# Patient Record
Sex: FEMALE | Race: WHITE
[De-identification: names, ages, dates, MRNs, and addresses within clinical notes are randomized per-mention and may not be internally consistent; named-entity substitution may affect disease eponyms.]

---

## 2017-04-01 ENCOUNTER — HOSPITAL ENCOUNTER (INPATIENT)
Dept: HOSPITAL 54 - ER | Age: 82
LOS: 3 days | Discharge: HOME | DRG: 871 | End: 2017-04-04
Attending: NURSE PRACTITIONER | Admitting: NURSE PRACTITIONER
Payer: MEDICARE

## 2017-04-01 VITALS — SYSTOLIC BLOOD PRESSURE: 100 MMHG | DIASTOLIC BLOOD PRESSURE: 50 MMHG

## 2017-04-01 VITALS — WEIGHT: 126 LBS | HEIGHT: 65 IN | BODY MASS INDEX: 20.99 KG/M2

## 2017-04-01 VITALS — DIASTOLIC BLOOD PRESSURE: 65 MMHG | SYSTOLIC BLOOD PRESSURE: 108 MMHG

## 2017-04-01 VITALS — DIASTOLIC BLOOD PRESSURE: 93 MMHG | SYSTOLIC BLOOD PRESSURE: 152 MMHG

## 2017-04-01 DIAGNOSIS — E78.5: ICD-10-CM

## 2017-04-01 DIAGNOSIS — I25.10: ICD-10-CM

## 2017-04-01 DIAGNOSIS — A41.9: Primary | ICD-10-CM

## 2017-04-01 DIAGNOSIS — Z87.440: ICD-10-CM

## 2017-04-01 DIAGNOSIS — G30.9: ICD-10-CM

## 2017-04-01 DIAGNOSIS — E11.65: ICD-10-CM

## 2017-04-01 DIAGNOSIS — F02.80: ICD-10-CM

## 2017-04-01 DIAGNOSIS — N17.0: ICD-10-CM

## 2017-04-01 DIAGNOSIS — G93.40: ICD-10-CM

## 2017-04-01 DIAGNOSIS — I10: ICD-10-CM

## 2017-04-01 DIAGNOSIS — N39.0: ICD-10-CM

## 2017-04-01 DIAGNOSIS — Z83.3: ICD-10-CM

## 2017-04-01 DIAGNOSIS — J96.02: ICD-10-CM

## 2017-04-01 DIAGNOSIS — Z79.82: ICD-10-CM

## 2017-04-01 DIAGNOSIS — F23: ICD-10-CM

## 2017-04-01 DIAGNOSIS — M54.30: ICD-10-CM

## 2017-04-01 DIAGNOSIS — K21.9: ICD-10-CM

## 2017-04-01 LAB
ALBUMIN SERPL BCP-MCNC: 3 G/DL (ref 3.4–5)
ALP SERPL-CCNC: 57 U/L (ref 46–116)
ALT SERPL W P-5'-P-CCNC: 8 U/L (ref 12–78)
APTT PPP: 27 SEC (ref 23–34)
AST SERPL W P-5'-P-CCNC: 15 U/L (ref 15–37)
BACTERIA UR CULT: YES
BASE EXCESS BLDA CALC-SCNC: -2 MMOL/L
BASOPHILS # BLD AUTO: 0 /CMM (ref 0–0.2)
BASOPHILS NFR BLD AUTO: 0.3 % (ref 0–2)
BILIRUB DIRECT SERPL-MCNC: 0.2 MG/DL (ref 0–0.2)
BILIRUB SERPL-MCNC: 0.6 MG/DL (ref 0.2–1)
BUN SERPL-MCNC: 24 MG/DL (ref 7–18)
CALCIUM SERPL-MCNC: 8.4 MG/DL (ref 8.5–10.1)
CHLORIDE SERPL-SCNC: 99 MMOL/L (ref 98–107)
CO2 SERPL-SCNC: 24 MMOL/L (ref 21–32)
CREAT SERPL-MCNC: 1.4 MG/DL (ref 0.6–1.3)
DO-HGB MFR BLDA: 15 MMHG
EOSINOPHIL # BLD AUTO: 0.1 /CMM (ref 0–0.7)
EOSINOPHIL NFR BLD AUTO: 2.1 % (ref 0–6)
GAS PNL BLDA: 10.7 G/DL (ref 12–16)
GLUCOSE SERPL-MCNC: 357 MG/DL (ref 74–106)
HCT VFR BLD AUTO: 35 % (ref 33–45)
HGB BLD-MCNC: 11.3 G/DL (ref 11.5–14.8)
INHALED O2 CONCENTRATION: 32 %
INHALED O2 FLOW RATE: 3 L/MIN (ref 0–30)
INR PPP: 1.09 (ref 0.87–1.13)
LYMPHOCYTES NFR BLD AUTO: 1.2 /CMM (ref 0.8–4.8)
LYMPHOCYTES NFR BLD AUTO: 16.9 % (ref 20–44)
MCH RBC QN AUTO: 29 PG (ref 26–33)
MCHC RBC AUTO-ENTMCNC: 33 G/DL (ref 31–36)
MCV RBC AUTO: 87 FL (ref 82–100)
MONOCYTES NFR BLD AUTO: 0.4 /CMM (ref 0.1–1.3)
MONOCYTES NFR BLD AUTO: 5.6 % (ref 2–12)
NEUTROPHILS # BLD AUTO: 5.4 /CMM (ref 1.8–8.9)
NEUTROPHILS NFR BLD AUTO: 75.1 % (ref 43–81)
PCO2 TEMP ADJ BLDA: 49.7 MMHG (ref 35–45)
PH TEMP ADJ BLDA: 7.31 [PH] (ref 7.35–7.45)
PH UR STRIP: 6.5 [PH] (ref 5–8)
PLATELET # BLD AUTO: 280 /CMM (ref 150–450)
PO2 TEMP ADJ BLDA: 155 MMHG (ref 75–100)
POTASSIUM SERPL-SCNC: 4.1 MMOL/L (ref 3.5–5.1)
PROT SERPL-MCNC: 7 G/DL (ref 6.4–8.2)
PROT UR QL STRIP: 30 MG/DL
PROTHROMBIN TIME: 11.3 SECS (ref 9.5–12.7)
RBC # BLD AUTO: 3.98 MIL/UL (ref 4–5.2)
RBC #/AREA URNS HPF: (no result) /HPF (ref 0–2)
RDW COEFFICIENT OF VARIATION: 12.4 (ref 11.5–15)
SAO2 % BLDA: 98 % (ref 92–98.5)
SODIUM SERPL-SCNC: 132 MMOL/L (ref 136–145)
SP GR UR STRIP: 1.01 (ref 1–1.03)
TROPONIN I SERPL-MCNC: < 0.017 NG/ML (ref 0–0.06)
UROBILINOGEN UR STRIP-MCNC: 0.2 EU/DL
VENTILATION MODE VENT: (no result)
WBC #/AREA URNS HPF: (no result) /HPF
WBC #/AREA URNS HPF: (no result) /HPF (ref 0–3)
WBC NRBC COR # BLD AUTO: 7.1 K/UL (ref 4.3–11)

## 2017-04-01 PROCEDURE — Z7610: HCPCS

## 2017-04-01 PROCEDURE — A4606 OXYGEN PROBE USED W OXIMETER: HCPCS

## 2017-04-01 RX ADMIN — SODIUM CHLORIDE PRN MLS/HR: 9 INJECTION, SOLUTION INTRAVENOUS at 16:01

## 2017-04-01 RX ADMIN — ATORVASTATIN CALCIUM SCH MG: 10 TABLET, FILM COATED ORAL at 22:57

## 2017-04-01 RX ADMIN — INSULIN DETEMIR SCH UNIT: 100 INJECTION, SOLUTION SUBCUTANEOUS at 22:00

## 2017-04-01 RX ADMIN — INSULIN HUMAN PRN UNIT: 100 INJECTION, SOLUTION PARENTERAL at 23:07

## 2017-04-01 RX ADMIN — Medication SCH EACH: at 18:17

## 2017-04-01 RX ADMIN — Medication SCH EACH: at 22:34

## 2017-04-01 NOTE — NUR
RN TELE NOTES

1737 FSBS RESULT = 11, PATIENT FOUND TO BE UNRESPONSIVE, CHARGE NURSE MADE AWARE

1742 RRT PAGED, D50% GIVEN AND FSBS REPEATED WITH RESULT , 

1745 RRT TEAM CAME LORENZO LLANOS FROM ICU, NATASHA RT AND DR. PACKER, VITAL SIGNS STABLE /50 
PULSE 74 R 14 O2SAT 94% TEMP 98.0 2ND DOSE OF D50% GIVEN, FSBS REPEATED WITH RESULT , 
PATIENT STILL UNRESPONSIVE. 

1748 ROMAZICON 0.2MG IVP, PATIENT WOKE UP THEN SLEPT AGAIN, ADDITIONAL 0.3MG IVP GIVEN AND 
PATIENT BECAME MORE ALERT. EKG TAKEN BY RT.

1805 RAPID RESPONSE END TIME 1805

1806 DR. STILES MADE AWARE BY ICU NURSE

ABG AND EKG RESULTS REVIEWED BY DR. PACKER WITH NO NEW ORDER AT THIS TIME. PATIENT TOLERATED 
AND CONSUMED 100% DINNER. LAST FSBS TAKEN WITH RESULT  AT 1900. PATIENT SLEEPING 
INTERMITTENTLY BUT EASILY AROUSABLE. NO S/SX OF DISTRESS AT THIS TIME. WILL ENDORSE TO NIGHT 
SHIFT FOR ROSSI.

## 2017-04-01 NOTE — NUR
radiology called to report pt was moving too much for CT. verbal order for 1mg 
ativan IV now received and administered.

## 2017-04-01 NOTE — NUR
RN TELE NOTES

RECEIVED PATIENT FROM E.R. DEPARTMENT, UNDER THE CARE OF DR. SANDRO STILES, WITH ADMITTING 
DIAGNOSIS OF PNEUMONIA, WITH MEDICAL HX OF DM, HTN, ALZHEIMERS, CARDIAC D/O, SCIATICA, UTI, 
URINARY RETENTION. PATIENT TRANSFERRED TO BED FROM [facility], ALERT AND ORIENTED X1, ON O2 AT 
2LPM VIA NC WITH SPO2 92-94%, NO SHORTNESS OF BREATH NOTED, BILATERAL LUNGS DIMINISHED UPON 
AUSCULTATION, APPEARS TO BE RESTLESS, INITIAL ASSESSMENT COMPLETED, ABDOMEN SOFT AND 
NON-DISTENDED, PIV ON LFA G20, PATENT AND INTACT, FLUSHES WELL WITH NS, AM CARE RENDERED, 
DIAPER SOILED AND CHANGED, ENSURE SAFETY AND COMFORT, SIDERAILS X2 UP, LOW BED, LOCKED 
POSITION, BED ALARM ON, CALL LIGHT WITHIN REACH, WILL CONTINUE TO MONITOR.

## 2017-04-01 NOTE — NUR
CT called to report pt is still moving too much for CT scan. verbal order 
received for 1mg ativan IV now; administered in CT room by me.

## 2017-04-02 VITALS — DIASTOLIC BLOOD PRESSURE: 97 MMHG | SYSTOLIC BLOOD PRESSURE: 118 MMHG

## 2017-04-02 VITALS — DIASTOLIC BLOOD PRESSURE: 69 MMHG | SYSTOLIC BLOOD PRESSURE: 111 MMHG

## 2017-04-02 VITALS — SYSTOLIC BLOOD PRESSURE: 118 MMHG | DIASTOLIC BLOOD PRESSURE: 97 MMHG

## 2017-04-02 VITALS — DIASTOLIC BLOOD PRESSURE: 78 MMHG | SYSTOLIC BLOOD PRESSURE: 155 MMHG

## 2017-04-02 VITALS — DIASTOLIC BLOOD PRESSURE: 65 MMHG | SYSTOLIC BLOOD PRESSURE: 137 MMHG

## 2017-04-02 LAB
ALBUMIN SERPL BCP-MCNC: 3 G/DL (ref 3.4–5)
ALP SERPL-CCNC: 59 U/L (ref 46–116)
ALT SERPL W P-5'-P-CCNC: 18 U/L (ref 12–78)
AST SERPL W P-5'-P-CCNC: 20 U/L (ref 15–37)
BASOPHILS # BLD AUTO: 0 /CMM (ref 0–0.2)
BASOPHILS NFR BLD AUTO: 0.4 % (ref 0–2)
BILIRUB SERPL-MCNC: 0.6 MG/DL (ref 0.2–1)
BUN SERPL-MCNC: 21 MG/DL (ref 7–18)
CALCIUM SERPL-MCNC: 8.7 MG/DL (ref 8.5–10.1)
CHLORIDE SERPL-SCNC: 107 MMOL/L (ref 98–107)
CHOLEST SERPL-MCNC: 100 MG/DL (ref ?–200)
CO2 SERPL-SCNC: 23 MMOL/L (ref 21–32)
CREAT SERPL-MCNC: 1.1 MG/DL (ref 0.6–1.3)
EOSINOPHIL # BLD AUTO: 0.1 /CMM (ref 0–0.7)
EOSINOPHIL NFR BLD AUTO: 1.7 % (ref 0–6)
GLUCOSE SERPL-MCNC: 104 MG/DL (ref 74–106)
HCT VFR BLD AUTO: 35 % (ref 33–45)
HDLC SERPL-MCNC: 42 MG/DL (ref 40–60)
HGB BLD-MCNC: 11.5 G/DL (ref 11.5–14.8)
LDLC SERPL DIRECT ASSAY-MCNC: 42 MG/DL (ref 0–99)
LYMPHOCYTES NFR BLD AUTO: 1.2 /CMM (ref 0.8–4.8)
LYMPHOCYTES NFR BLD AUTO: 15.7 % (ref 20–44)
MAGNESIUM SERPL-MCNC: 1.7 MG/DL (ref 1.8–2.4)
MCH RBC QN AUTO: 29 PG (ref 26–33)
MCHC RBC AUTO-ENTMCNC: 33 G/DL (ref 31–36)
MCV RBC AUTO: 88 FL (ref 82–100)
MONOCYTES NFR BLD AUTO: 0.6 /CMM (ref 0.1–1.3)
MONOCYTES NFR BLD AUTO: 7 % (ref 2–12)
NEUTROPHILS # BLD AUTO: 5.9 /CMM (ref 1.8–8.9)
NEUTROPHILS NFR BLD AUTO: 75.2 % (ref 43–81)
PHOSPHATE SERPL-MCNC: 3.7 MG/DL (ref 2.5–4.9)
PLATELET # BLD AUTO: 299 /CMM (ref 150–450)
POTASSIUM SERPL-SCNC: 4.3 MMOL/L (ref 3.5–5.1)
PROT SERPL-MCNC: 7.3 G/DL (ref 6.4–8.2)
RBC # BLD AUTO: 3.99 MIL/UL (ref 4–5.2)
RDW COEFFICIENT OF VARIATION: 13.5 (ref 11.5–15)
SODIUM SERPL-SCNC: 143 MMOL/L (ref 136–145)
TRIGL SERPL-MCNC: 92 MG/DL (ref 30–150)
TSH SERPL DL<=0.005 MIU/L-ACNC: 1.4 UIU/ML (ref 0.36–3.74)
WBC NRBC COR # BLD AUTO: 7.9 K/UL (ref 4.3–11)

## 2017-04-02 RX ADMIN — METFORMIN HYDROCHLORIDE SCH MG: 500 TABLET, FILM COATED ORAL at 09:53

## 2017-04-02 RX ADMIN — LOSARTAN POTASSIUM SCH MG: 50 TABLET, FILM COATED ORAL at 09:53

## 2017-04-02 RX ADMIN — INSULIN HUMAN PRN UNIT: 100 INJECTION, SOLUTION PARENTERAL at 12:08

## 2017-04-02 RX ADMIN — MAGNESIUM SULFATE IN DEXTROSE SCH MLS/HR: 10 INJECTION, SOLUTION INTRAVENOUS at 12:41

## 2017-04-02 RX ADMIN — PANTOPRAZOLE SODIUM SCH MG: 40 TABLET, DELAYED RELEASE ORAL at 09:53

## 2017-04-02 RX ADMIN — ASPIRIN 81 MG SCH MG: 81 TABLET ORAL at 09:52

## 2017-04-02 RX ADMIN — INSULIN HUMAN PRN UNIT: 100 INJECTION, SOLUTION PARENTERAL at 17:36

## 2017-04-02 RX ADMIN — AMLODIPINE BESYLATE SCH MG: 10 TABLET ORAL at 09:53

## 2017-04-02 RX ADMIN — MAGNESIUM SULFATE IN DEXTROSE SCH MLS/HR: 10 INJECTION, SOLUTION INTRAVENOUS at 13:47

## 2017-04-02 RX ADMIN — DEXTROSE MONOHYDRATE SCH MLS/HR: 50 INJECTION, SOLUTION INTRAVENOUS at 14:50

## 2017-04-02 RX ADMIN — INSULIN DETEMIR SCH UNIT: 100 INJECTION, SOLUTION SUBCUTANEOUS at 21:25

## 2017-04-02 RX ADMIN — Medication SCH EACH: at 07:30

## 2017-04-02 RX ADMIN — INSULIN HUMAN PRN UNIT: 100 INJECTION, SOLUTION PARENTERAL at 21:26

## 2017-04-02 RX ADMIN — METOPROLOL SUCCINATE SCH MG: 50 TABLET, EXTENDED RELEASE ORAL at 09:54

## 2017-04-02 RX ADMIN — Medication SCH EACH: at 21:23

## 2017-04-02 RX ADMIN — ATORVASTATIN CALCIUM SCH MG: 10 TABLET, FILM COATED ORAL at 21:23

## 2017-04-02 RX ADMIN — SODIUM CHLORIDE PRN MLS/HR: 9 INJECTION, SOLUTION INTRAVENOUS at 05:43

## 2017-04-02 RX ADMIN — Medication SCH EACH: at 12:06

## 2017-04-02 RX ADMIN — Medication SCH EACH: at 17:36

## 2017-04-02 RX ADMIN — INSULIN HUMAN PRN UNIT: 100 INJECTION, SOLUTION PARENTERAL at 18:47

## 2017-04-02 NOTE — NUR
RN NOTES PT. AGITATION



PAGED DR STILES THROUGH [email or web address]. PATIENT IS TRYING TO GET OFF BED, PULLING HER IV SITE, REMOVED 
HER TELE LEADS AND BOX, REMOVED GOWN AND BLANKET.

## 2017-04-02 NOTE — NUR
VIET NOTES BLOOD SUGAR 



PATIENT BLOOD SUGAR IS HIGH. 1ST BLOOD SUGAR 402, 2ND BLOOD SUGAR 420. 10 REGULAR INSULIN 
ADMINISTERED. MD NOTIFIED . 

-------------------------------------------------------------------------------

Addendum: 04/02/17 at 1848 by MAHSA BARRERA RN

-------------------------------------------------------------------------------

DR STILES REPLIED. ORDERED TO RECHECK BLOOD SUGAR AND TO ADMINISTER 10 MORE UNITS. BLOOD 
SUGAR , 10 UNITS ADMINISTERED

## 2017-04-02 NOTE — NUR
RN OPEN NOTES



RECEIVED REPORT FROM NIGHT SHIFT NURSE. PATIENT IS IN BED, AWAKE. Tongan SPEAKING. PATIENT 
IS REMOVING BLANKETS AND TRYING TO PULL HER IV LINE. SINUS RHYTHM ON THE MONITOR. IV SITE IS 
COVERED. 3 SIDE RAILS ARE UP. SITTER WILL BE REQUESTED. BED IS IN LOW POSITION AND LOCKED. 
WILL CONTINUE TO ASSESS AND MONITOR PATIENT CONDITION THROUGH OUT MY SHIFT.

## 2017-04-02 NOTE — NUR
TELE RN NOTES



AWAKE & CONFUSED. NOT IN ANY DISTRESS. NO SOB NOTED. DENIES ANY PAIN OR DISCOMFORT AT THIS 
TIME. ON TELE SR @ 69 WITH IVF INFUSING WELL. AM CARE DONE. MONITORED ACCORDINGLY. CALL 
LIGHT WITHIN REACH. BED IN LOWEST POSITION. SR UP X 3 WITH BED ALARM ON FOR SAFETY. WILL  
ENDORSE TO NEXT SHIFT.

## 2017-04-02 NOTE — NUR
RN CLOSING NOTES



PATIENT IS SITTING IN A WHEELCHAIR IN THE HALLWAY NEXT TO THE NURSING STATION. WHEELCHAIR 
LOCKED =. PATIENT IS AWAKE AND TALKING. SPEAKING Thai ONLY. IV SITE ON THE RIGHT WRIST 
IS INTACT AND POTENT. NO SIGN AND SYMPTOMS OF DISTRESS. PLEASE REFER TO BLOOD SUGAR LEVEL IN 
MY PREVIOUS NOTE. 1:1 SITTER ORDERED FOR NIGHT SHIFT.  WILL ENDORSE TO NIGHT SHIFT NURSE.

## 2017-04-02 NOTE — NUR
RN NOTES PT



PATIENT WAS WORKING WITH PHYSICAL THERAPY. WAS ABLE TO SIT AT THE EDGE OF THE BED AND WALKED 
TWO STEPS TO THE SIDE. COMPLETE LINEN AND DIAPER CHANGED

## 2017-04-03 VITALS — DIASTOLIC BLOOD PRESSURE: 79 MMHG | SYSTOLIC BLOOD PRESSURE: 150 MMHG

## 2017-04-03 VITALS — SYSTOLIC BLOOD PRESSURE: 117 MMHG | DIASTOLIC BLOOD PRESSURE: 73 MMHG

## 2017-04-03 VITALS — DIASTOLIC BLOOD PRESSURE: 74 MMHG | SYSTOLIC BLOOD PRESSURE: 130 MMHG

## 2017-04-03 LAB
BASOPHILS # BLD AUTO: 0 /CMM (ref 0–0.2)
BASOPHILS NFR BLD AUTO: 0.6 % (ref 0–2)
BUN SERPL-MCNC: 24 MG/DL (ref 7–18)
CALCIUM SERPL-MCNC: 8.4 MG/DL (ref 8.5–10.1)
CHLORIDE SERPL-SCNC: 108 MMOL/L (ref 98–107)
CO2 SERPL-SCNC: 25 MMOL/L (ref 21–32)
CREAT SERPL-MCNC: 1.2 MG/DL (ref 0.6–1.3)
EOSINOPHIL # BLD AUTO: 0.1 /CMM (ref 0–0.7)
EOSINOPHIL NFR BLD AUTO: 1.8 % (ref 0–6)
GLUCOSE SERPL-MCNC: 88 MG/DL (ref 74–106)
HCT VFR BLD AUTO: 34 % (ref 33–45)
HGB BLD-MCNC: 11.2 G/DL (ref 11.5–14.8)
LYMPHOCYTES NFR BLD AUTO: 1.4 /CMM (ref 0.8–4.8)
LYMPHOCYTES NFR BLD AUTO: 21.1 % (ref 20–44)
MAGNESIUM SERPL-MCNC: 2.1 MG/DL (ref 1.8–2.4)
MCH RBC QN AUTO: 28 PG (ref 26–33)
MCHC RBC AUTO-ENTMCNC: 33 G/DL (ref 31–36)
MCV RBC AUTO: 87 FL (ref 82–100)
MONOCYTES NFR BLD AUTO: 0.6 /CMM (ref 0.1–1.3)
MONOCYTES NFR BLD AUTO: 8.6 % (ref 2–12)
NEUTROPHILS # BLD AUTO: 4.5 /CMM (ref 1.8–8.9)
NEUTROPHILS NFR BLD AUTO: 67.9 % (ref 43–81)
PHOSPHATE SERPL-MCNC: 3.6 MG/DL (ref 2.5–4.9)
PLATELET # BLD AUTO: 275 /CMM (ref 150–450)
POTASSIUM SERPL-SCNC: 4.2 MMOL/L (ref 3.5–5.1)
RBC # BLD AUTO: 3.93 MIL/UL (ref 4–5.2)
RDW COEFFICIENT OF VARIATION: 13.3 (ref 11.5–15)
SODIUM SERPL-SCNC: 144 MMOL/L (ref 136–145)
WBC NRBC COR # BLD AUTO: 6.7 K/UL (ref 4.3–11)

## 2017-04-03 RX ADMIN — Medication SCH EACH: at 12:00

## 2017-04-03 RX ADMIN — Medication SCH EACH: at 21:42

## 2017-04-03 RX ADMIN — Medication SCH EACH: at 07:30

## 2017-04-03 RX ADMIN — NEOMYCIN AND POLYMYXIN B SULFATES AND BACITRACIN ZINC SCH GM: 400; 3.5; 5 OINTMENT TOPICAL at 17:05

## 2017-04-03 RX ADMIN — PANTOPRAZOLE SODIUM SCH MG: 40 TABLET, DELAYED RELEASE ORAL at 09:34

## 2017-04-03 RX ADMIN — ASPIRIN 81 MG SCH MG: 81 TABLET ORAL at 09:34

## 2017-04-03 RX ADMIN — DEXTROSE MONOHYDRATE SCH MLS/HR: 50 INJECTION, SOLUTION INTRAVENOUS at 15:37

## 2017-04-03 RX ADMIN — LOSARTAN POTASSIUM SCH MG: 50 TABLET, FILM COATED ORAL at 09:35

## 2017-04-03 RX ADMIN — METFORMIN HYDROCHLORIDE SCH MG: 500 TABLET, FILM COATED ORAL at 09:00

## 2017-04-03 RX ADMIN — INSULIN HUMAN PRN UNIT: 100 INJECTION, SOLUTION PARENTERAL at 17:33

## 2017-04-03 RX ADMIN — METOPROLOL SUCCINATE SCH MG: 50 TABLET, EXTENDED RELEASE ORAL at 09:34

## 2017-04-03 RX ADMIN — Medication SCH EACH: at 17:03

## 2017-04-03 RX ADMIN — AMLODIPINE BESYLATE SCH MG: 10 TABLET ORAL at 09:34

## 2017-04-03 RX ADMIN — INSULIN DETEMIR SCH UNIT: 100 INJECTION, SOLUTION SUBCUTANEOUS at 21:48

## 2017-04-03 RX ADMIN — ATORVASTATIN CALCIUM SCH MG: 10 TABLET, FILM COATED ORAL at 21:41

## 2017-04-03 RX ADMIN — INSULIN HUMAN PRN UNIT: 100 INJECTION, SOLUTION PARENTERAL at 21:53

## 2017-04-03 NOTE — NUR
WOUND CARE CONSULT: PATIENT SEEN AND SKIN ASSESSMENT DONE. PATIENT ALERT, UNABLE TO TURN AND 
REPOSITION SELF, INCONTINENT, CRISS 13, MATTHEW ISOFLEX ORION BED IN USE. SEE TODAY'S SKIN 
ASSESSMENT ALONG WITH RECOMMENDATIONS. RECOMMEND MOISTURE / SKIN PROTECTION AND PRESSURE 
PREVENTION MEASURES AS ORDERED. ALL DISCUSSED WITH NURSING STAFF. MD IN AGREEMENT WITH PLAN 
OF CARE. 

-------------------------------------------------------------------------------

Addendum: 04/03/17 at 0839 by TYLER OROPEZA WNDNU

-------------------------------------------------------------------------------

Amended: Links added.

## 2017-04-03 NOTE — NUR
MS RN NOTE:



PATIENT BLOOD SUGAR LEVEL 289MG/DL, PATIENT TO RECEIVE 10 UNITS OF LEVEMIR PER MD ORDER AND 
6 UNITS OF INSULIN PER SLIDING SCALE. NO S/S OF HYPER/HYPOGLYCEMIA NOTED AT THIS TIME. WILL 
CONTINUE TO MONITOR.

## 2017-04-03 NOTE — NUR
MS RN NOTE:



PATIENT RESTING IN BED, NO ACUTE DISTRESS NOTED. BREATHING EVEN AND UNLABORED, NO SOB NOTED. 
IV TO RIGHT WRIST IN PLACE. NO S/S OF HYPER/HYPOGLYCEMIA NOTED AT THIS TIME. SITTER AT 
BEDSIDE. BED LOCKED AND IN LOWEST POSITION, CALL LIGHT IN REACH. WILL CONTINUE TO MONITOR.

## 2017-04-03 NOTE — NUR
MS RN

SON TEXTED THAT NOBODY TO PICK HIS MOM TODAY,HE IS IN [facility]. MD AWARE.

## 2017-04-03 NOTE — NUR
MS RN

CALLED SON NASRA, FOR DISCHARGE, SON REFUSE MOM TO BE DISCHARGE TODAY, ACCORDING TO HIM,SHE 
IS NOT YET READY TO BE DISCHARGE,AND WANTS TO SPEAK W/ THE DOCTOR.

## 2017-04-03 NOTE — NUR
MS RN

RECEIVED ON BED, AWAKE,, ORIENTED X1, ON ONE ON ONE SITTER FOR SAFETY, DENIES PAIN AT THIS 
TIME,NO DISTRESS NOTED, WILL MONITOR PATIENT'S CONDITION.

## 2017-04-03 NOTE — NUR
MS RN

BS - 217 - NO COVERAGE GIVEN, PATIENT REFUSE LUNCH,NOT EATING WELL, HAD HISTORY OF 
HYPOGLYCEMIA.

## 2017-04-03 NOTE — NUR
MS RN NOTES



AWAKE & CONFUSED. NOT IN ANY DISTRESS. NO SOB NOTED. DENIES ANY PAIN OR DISCOMFORT AT THIS 
TIME.  WITH IVF INFUSING WELL. AM CARE DONE. MONITORED ACCORDINGLY. CALL LIGHT WITHIN REACH. 
BED IN LOWEST POSITION. WITH SITTER AT BEDSIDE. SR UP X 3 WITH BED ALARM ON FOR SAFETY. WILL 
 ENDORSE TO NEXT SHIFT.

## 2017-04-04 VITALS — SYSTOLIC BLOOD PRESSURE: 157 MMHG | DIASTOLIC BLOOD PRESSURE: 59 MMHG

## 2017-04-04 LAB
BASOPHILS # BLD AUTO: 0 /CMM (ref 0–0.2)
BASOPHILS NFR BLD AUTO: 0.5 % (ref 0–2)
BUN SERPL-MCNC: 21 MG/DL (ref 7–18)
CALCIUM SERPL-MCNC: 8.7 MG/DL (ref 8.5–10.1)
CHLORIDE SERPL-SCNC: 105 MMOL/L (ref 98–107)
CO2 SERPL-SCNC: 24 MMOL/L (ref 21–32)
CREAT SERPL-MCNC: 1.2 MG/DL (ref 0.6–1.3)
EOSINOPHIL # BLD AUTO: 0.2 /CMM (ref 0–0.7)
EOSINOPHIL NFR BLD AUTO: 2.9 % (ref 0–6)
GLUCOSE SERPL-MCNC: 267 MG/DL (ref 74–106)
HCT VFR BLD AUTO: 34 % (ref 33–45)
HGB BLD-MCNC: 11.4 G/DL (ref 11.5–14.8)
LYMPHOCYTES NFR BLD AUTO: 1.1 /CMM (ref 0.8–4.8)
LYMPHOCYTES NFR BLD AUTO: 17.3 % (ref 20–44)
MAGNESIUM SERPL-MCNC: 1.7 MG/DL (ref 1.8–2.4)
MCH RBC QN AUTO: 29 PG (ref 26–33)
MCHC RBC AUTO-ENTMCNC: 33 G/DL (ref 31–36)
MCV RBC AUTO: 86 FL (ref 82–100)
MONOCYTES NFR BLD AUTO: 0.5 /CMM (ref 0.1–1.3)
MONOCYTES NFR BLD AUTO: 8.3 % (ref 2–12)
NEUTROPHILS # BLD AUTO: 4.3 /CMM (ref 1.8–8.9)
NEUTROPHILS NFR BLD AUTO: 71 % (ref 43–81)
PHOSPHATE SERPL-MCNC: 3.4 MG/DL (ref 2.5–4.9)
PLATELET # BLD AUTO: 263 /CMM (ref 150–450)
POTASSIUM SERPL-SCNC: 3.9 MMOL/L (ref 3.5–5.1)
RBC # BLD AUTO: 3.96 MIL/UL (ref 4–5.2)
RDW COEFFICIENT OF VARIATION: 13.7 (ref 11.5–15)
SODIUM SERPL-SCNC: 142 MMOL/L (ref 136–145)
WBC NRBC COR # BLD AUTO: 6.1 K/UL (ref 4.3–11)

## 2017-04-04 RX ADMIN — LOSARTAN POTASSIUM SCH MG: 50 TABLET, FILM COATED ORAL at 09:06

## 2017-04-04 RX ADMIN — INSULIN HUMAN PRN UNIT: 100 INJECTION, SOLUTION PARENTERAL at 06:50

## 2017-04-04 RX ADMIN — Medication SCH EACH: at 06:44

## 2017-04-04 RX ADMIN — NEOMYCIN AND POLYMYXIN B SULFATES AND BACITRACIN ZINC SCH APPLIC: 400; 3.5; 5 OINTMENT TOPICAL at 09:11

## 2017-04-04 RX ADMIN — AMLODIPINE BESYLATE SCH MG: 10 TABLET ORAL at 09:06

## 2017-04-04 RX ADMIN — ASPIRIN 81 MG SCH MG: 81 TABLET ORAL at 09:05

## 2017-04-04 RX ADMIN — METFORMIN HYDROCHLORIDE SCH MG: 500 TABLET, FILM COATED ORAL at 09:06

## 2017-04-04 RX ADMIN — Medication SCH EACH: at 12:31

## 2017-04-04 RX ADMIN — PANTOPRAZOLE SODIUM SCH MG: 40 TABLET, DELAYED RELEASE ORAL at 09:05

## 2017-04-04 RX ADMIN — INSULIN HUMAN PRN UNIT: 100 INJECTION, SOLUTION PARENTERAL at 12:32

## 2017-04-04 RX ADMIN — METOPROLOL SUCCINATE SCH MG: 50 TABLET, EXTENDED RELEASE ORAL at 09:06

## 2017-04-04 NOTE — NUR
MS RN NOTE:



PATIENT RESTING IN BED, NO ACUTE DISTRESS NOTED. BREATHING EVEN AND UNLABORED, NO SOB NOTED. 
IV TO RIGHT WRIST IN PLACE. PATIENT BLOOD SUGAR LEVEL 247MG/DL, PATIENT TO RECEIVE 4 UNITS 
OF INSULIN PER SLIDING SCALE, NO S/S OF HYPER/HYPOGLYCEMIA NOTED AT THIS TIME. SITTER AT 
BEDSIDE. BED LOCKED AND IN LOWEST POSITION, CALL LIGHT IN REACH. WILL ENDORSE TO DAY NURSE 
TO CONTINUE WITH PLAN OF CARE.

## 2017-04-04 NOTE — NUR
MS RN NOTE:



PATIENT SITTING UP IN BED, NO ACUTE DISTRESS NOTED. BREATHING EVEN AND UNLABORED, NO SOB 
NOTED IN NO APPARENT PAIN OR DISCOMFORT. IV TO RIGHT WRIST IN PLACE AND ID BAND REMOVED NO 
S/S OF HYPER/HYPOGLYCEMIA NOTED AT THIS TIME.PATIENT CONTINUES TO REFUSE PICTURES OF SKIN, 
NURSING EDUCATION REINFORCED, DISCHARGE INSTRUCTIONS REVIEWED WITH SON NASRA, WITH NOTED 
VERBAL UNDERSTANDING, ASSISTED TO LOBBY BY CNA DISCHARGED IN STABLE CONDITION

## 2017-04-04 NOTE — NUR
RN NOTES

PATIENT WITH DISCHARGE ORDERS, WILL CONTINUE TO MONITOR AND ASSIST WITH DISCHARGE PROCESS AS 
APPROPRIATE, PT REFUSES PICTURES OF SKIN AT THIS TIME NOTED WITH CONFUSION MD AWARE, NURSING 
EDUCATION REINFORCED, WILL CONTINUE TO MONITOR SON WILL

## 2017-04-04 NOTE — NUR
MS RN NOTE:



PATIENT RESTING IN BED, NO ACUTE DISTRESS NOTED. BREATHING EVEN AND UNLABORED, NO SOB NOTED 
IN NO APPARENT PAIN OR DISCOMFORT. IV TO RIGHT WRIST IN PLACE.  NO S/S OF HYPER/HYPOGLYCEMIA 
NOTED AT THIS TIME. SITTER AT BEDSIDE. BED LOCKED AND IN LOWEST POSITION, CALL LIGHT IN 
REACH. WILL CONTINUE WITH PLAN OF CARE.

## 2017-06-19 ENCOUNTER — HOSPITAL ENCOUNTER (INPATIENT)
Dept: HOSPITAL 54 - ER | Age: 82
LOS: 3 days | Discharge: HOME HEALTH | DRG: 682 | End: 2017-06-22
Attending: NURSE PRACTITIONER | Admitting: NURSE PRACTITIONER
Payer: MEDICARE

## 2017-06-19 VITALS — SYSTOLIC BLOOD PRESSURE: 129 MMHG | DIASTOLIC BLOOD PRESSURE: 59 MMHG

## 2017-06-19 VITALS — HEIGHT: 59 IN | WEIGHT: 130 LBS | BODY MASS INDEX: 26.21 KG/M2

## 2017-06-19 VITALS — DIASTOLIC BLOOD PRESSURE: 66 MMHG | SYSTOLIC BLOOD PRESSURE: 129 MMHG

## 2017-06-19 VITALS — SYSTOLIC BLOOD PRESSURE: 129 MMHG | DIASTOLIC BLOOD PRESSURE: 88 MMHG

## 2017-06-19 DIAGNOSIS — N39.0: ICD-10-CM

## 2017-06-19 DIAGNOSIS — Z87.440: ICD-10-CM

## 2017-06-19 DIAGNOSIS — S40.021A: ICD-10-CM

## 2017-06-19 DIAGNOSIS — G93.40: ICD-10-CM

## 2017-06-19 DIAGNOSIS — B96.20: ICD-10-CM

## 2017-06-19 DIAGNOSIS — K21.9: ICD-10-CM

## 2017-06-19 DIAGNOSIS — M54.30: ICD-10-CM

## 2017-06-19 DIAGNOSIS — G30.9: ICD-10-CM

## 2017-06-19 DIAGNOSIS — N17.0: Primary | ICD-10-CM

## 2017-06-19 DIAGNOSIS — F02.80: ICD-10-CM

## 2017-06-19 DIAGNOSIS — E11.65: ICD-10-CM

## 2017-06-19 DIAGNOSIS — E78.5: ICD-10-CM

## 2017-06-19 DIAGNOSIS — I25.10: ICD-10-CM

## 2017-06-19 DIAGNOSIS — L89.314: ICD-10-CM

## 2017-06-19 DIAGNOSIS — E86.0: ICD-10-CM

## 2017-06-19 LAB
ALBUMIN SERPL BCP-MCNC: 3.3 G/DL (ref 3.4–5)
ALP SERPL-CCNC: 66 U/L (ref 46–116)
ALT SERPL W P-5'-P-CCNC: 12 U/L (ref 12–78)
APTT PPP: 24 SEC (ref 23–34)
AST SERPL W P-5'-P-CCNC: 14 U/L (ref 15–37)
BASOPHILS # BLD AUTO: 0 /CMM (ref 0–0.2)
BASOPHILS NFR BLD AUTO: 0.5 % (ref 0–2)
BILIRUB DIRECT SERPL-MCNC: 0.1 MG/DL (ref 0–0.2)
BILIRUB SERPL-MCNC: 0.5 MG/DL (ref 0.2–1)
BUN SERPL-MCNC: 30 MG/DL (ref 7–18)
CALCIUM SERPL-MCNC: 8.6 MG/DL (ref 8.5–10.1)
CHLORIDE SERPL-SCNC: 105 MMOL/L (ref 98–107)
CO2 SERPL-SCNC: 24 MMOL/L (ref 21–32)
CREAT SERPL-MCNC: 1.2 MG/DL (ref 0.6–1.3)
EOSINOPHIL # BLD AUTO: 0.1 /CMM (ref 0–0.7)
EOSINOPHIL NFR BLD AUTO: 2.2 % (ref 0–6)
GLUCOSE SERPL-MCNC: 252 MG/DL (ref 74–106)
HCT VFR BLD AUTO: 37 % (ref 33–45)
HGB BLD-MCNC: 12.2 G/DL (ref 11.5–14.8)
INR PPP: 1 (ref 0.87–1.13)
LYMPHOCYTES NFR BLD AUTO: 1.4 /CMM (ref 0.8–4.8)
LYMPHOCYTES NFR BLD AUTO: 21.2 % (ref 20–44)
MCH RBC QN AUTO: 28 PG (ref 26–33)
MCHC RBC AUTO-ENTMCNC: 33 G/DL (ref 31–36)
MCV RBC AUTO: 85 FL (ref 82–100)
MONOCYTES NFR BLD AUTO: 0.4 /CMM (ref 0.1–1.3)
MONOCYTES NFR BLD AUTO: 6.4 % (ref 2–12)
NEUTROPHILS # BLD AUTO: 4.5 /CMM (ref 1.8–8.9)
NEUTROPHILS NFR BLD AUTO: 69.7 % (ref 43–81)
PH UR STRIP: 5.5 [PH] (ref 5–8)
PLATELET # BLD AUTO: 262 /CMM (ref 150–450)
POTASSIUM SERPL-SCNC: 3.8 MMOL/L (ref 3.5–5.1)
PROT SERPL-MCNC: 7.8 G/DL (ref 6.4–8.2)
PROT UR QL STRIP: 30 MG/DL
PROTHROMBIN TIME: 10.4 SECS (ref 9.5–12.7)
RBC # BLD AUTO: 4.38 MIL/UL (ref 4–5.2)
RBC #/AREA URNS HPF: (no result) /HPF (ref 0–2)
RDW COEFFICIENT OF VARIATION: 14 (ref 11.5–15)
SODIUM SERPL-SCNC: 138 MMOL/L (ref 136–145)
TROPONIN I SERPL-MCNC: < 0.017 NG/ML (ref 0–0.06)
UROBILINOGEN UR STRIP-MCNC: 0.2 EU/DL
WBC #/AREA URNS HPF: (no result) /HPF
WBC #/AREA URNS HPF: (no result) /HPF (ref 0–3)
WBC NRBC COR # BLD AUTO: 6.4 K/UL (ref 4.3–11)

## 2017-06-19 PROCEDURE — A6248 HYDROGEL DRSG GEL FILLER: HCPCS

## 2017-06-19 PROCEDURE — Z7610: HCPCS

## 2017-06-19 PROCEDURE — A4606 OXYGEN PROBE USED W OXIMETER: HCPCS

## 2017-06-19 RX ADMIN — METFORMIN HYDROCHLORIDE SCH MG: 500 TABLET, FILM COATED ORAL at 16:56

## 2017-06-19 RX ADMIN — SODIUM CHLORIDE PRN MLS/HR: 9 INJECTION, SOLUTION INTRAVENOUS at 17:02

## 2017-06-19 RX ADMIN — ATORVASTATIN CALCIUM SCH MG: 10 TABLET, FILM COATED ORAL at 21:14

## 2017-06-19 RX ADMIN — Medication SCH EACH: at 21:16

## 2017-06-19 RX ADMIN — INSULIN HUMAN PRN UNIT: 100 INJECTION, SOLUTION PARENTERAL at 21:23

## 2017-06-19 RX ADMIN — Medication SCH EACH: at 17:24

## 2017-06-19 RX ADMIN — ENOXAPARIN SODIUM SCH MG: 30 INJECTION SUBCUTANEOUS at 21:15

## 2017-06-19 NOTE — NUR
RN MS NOTES

BLOOD SUGAR CHECKED TWICE WITH RESULT OF 49MG/DL. PATIENT IS ALERT AND ORIENTED, VERBALLY 
RESPONSIVE, ASYMPTOMATIC, PATIENT GIVEN ORANGE JUICE AND IS NOW EATING DINNER, WILL RECHECK 
BLOOD SUGAR AFTER DINNER, CHARGE NURSE MADE AWARE.

## 2017-06-19 NOTE — NUR
MS RN NOTE



PATIENT RECEIVED AWAKE AND ALERT X1 IN BED. PT. CONFUSED. SITTER AT BEDSIDE. NO SOB OR 
RESPIRATORY DISTRESS NOTED. NO S/S O FPAIN. IV SITE INTACT, WITH NO REDNESS OR INFILTRATION 
NOTED. BED LOCKED AND IN LOWEST POSITION. SIDE RAILS UP, CALL LIGHT WITHIN REACH. WILL 
CONTINUE TO MONITOR.

## 2017-06-19 NOTE — NUR
VIET MS NOTES

INFORMED BY HORTENCIA PATIENT ATE DINNER 100% WITH ORANGE JUICE, BLOOD SUGAR RECHECKED WITH RESULT 
OF 120MG/DL, PATIENT IS IN STABLE CONDITION, NO CHANGE IN LOC, NO DISTRESS NOTED, FAMILY AT 
BEDSIDE, APPEARS TO BE CALM AT THIS TIME, 

-------------------------------------------------------------------------------

Addendum: 06/19/17 at 1846 by COREY NAIR RN

-------------------------------------------------------------------------------

ADDENDUM: 

MD MADE AWARE, ALL NEEDS ATTENDED AND MET, ADL CARE PROVIDED, SITTER AT BEDSIDE, WILL 
ENDORSE TO NIGHT SHIFT FOR ROSSI.

## 2017-06-19 NOTE — NUR
RN MS NOTES

ATIVAN 0.5MG IV X1 NOT GIVEN, PATIENT IS CALM AT THIS TIME, FAMILY AND SITTER AT BEDSIDE TO 
ENSURE SAFETY AND COMFORT.

## 2017-06-19 NOTE — NUR
RN MS NOTES 

PATIENT ALERT AND ORIENTED X1, HAS EPISODES OF AGITATION, DR. STILES MADE AWARE AND GAVE 
ATIVAN ORDER X1, ORDER NOTED AND CARRIED OUT.

## 2017-06-19 NOTE — NUR
PT TO ED ROOM 06. BB SON: POSSIBLE UTI. ARLET FASTING BS IN AM: 230.MIRA. 
A/A/O x 1 2/2 ALZHEIMER'S DEMENTIA. CHANGED TO GOWN. SIDE RAISL UP. HOB 
ELEVATED. CONENCTED TO [facility]. SEEN AND EVALAUTED BY ED PROVIDER.

## 2017-06-19 NOTE — NUR
RN ADMISSION NOTE

RECEIVED PATIENT FROM E.R. DEPARTMENT VIA GURNEY, ALERT AND ORIENTED X1, CONFUSED, Occitan 
SPEAKING, ADMITTED DIAGNOSIS UTI&AMS, UNDER THE CARE OF DR. SANDRO STILES MD MADE AWARE OF 
ADMISSION. PATIENT PLACED COMFORTABLY IN BED, SKIN ASSESSMENT COMPLETED, PHOTOS TAKEN OF 
WOUNDS ON BILATERAL BUTTOCKS, CLEANSED WITH NS, COVERED WITH MEPILEX, SITTER AT BEDSIDE, 
WILL CONTINUE TO MONITOR.

## 2017-06-20 VITALS — DIASTOLIC BLOOD PRESSURE: 69 MMHG | SYSTOLIC BLOOD PRESSURE: 128 MMHG

## 2017-06-20 VITALS — SYSTOLIC BLOOD PRESSURE: 136 MMHG | DIASTOLIC BLOOD PRESSURE: 64 MMHG

## 2017-06-20 VITALS — SYSTOLIC BLOOD PRESSURE: 145 MMHG | DIASTOLIC BLOOD PRESSURE: 54 MMHG

## 2017-06-20 LAB
ALBUMIN SERPL BCP-MCNC: 3.5 G/DL (ref 3.4–5)
ALP SERPL-CCNC: 75 U/L (ref 46–116)
ALT SERPL W P-5'-P-CCNC: 18 U/L (ref 12–78)
AST SERPL W P-5'-P-CCNC: 17 U/L (ref 15–37)
BASOPHILS # BLD AUTO: 0 /CMM (ref 0–0.2)
BASOPHILS NFR BLD AUTO: 0.3 % (ref 0–2)
BILIRUB SERPL-MCNC: 0.4 MG/DL (ref 0.2–1)
BUN SERPL-MCNC: 23 MG/DL (ref 7–18)
CALCIUM SERPL-MCNC: 8.8 MG/DL (ref 8.5–10.1)
CHLORIDE SERPL-SCNC: 107 MMOL/L (ref 98–107)
CHOLEST SERPL-MCNC: 136 MG/DL (ref ?–200)
CO2 SERPL-SCNC: 26 MMOL/L (ref 21–32)
CREAT SERPL-MCNC: 1 MG/DL (ref 0.6–1.3)
EOSINOPHIL # BLD AUTO: 0 /CMM (ref 0–0.7)
EOSINOPHIL NFR BLD AUTO: 0.4 % (ref 0–6)
GLUCOSE SERPL-MCNC: 108 MG/DL (ref 74–106)
HCT VFR BLD AUTO: 39 % (ref 33–45)
HDLC SERPL-MCNC: 55 MG/DL (ref 40–60)
HGB BLD-MCNC: 12.8 G/DL (ref 11.5–14.8)
LDLC SERPL DIRECT ASSAY-MCNC: 55 MG/DL (ref 0–99)
LYMPHOCYTES NFR BLD AUTO: 1 /CMM (ref 0.8–4.8)
LYMPHOCYTES NFR BLD AUTO: 13.5 % (ref 20–44)
MAGNESIUM SERPL-MCNC: 1.5 MG/DL (ref 1.8–2.4)
MCH RBC QN AUTO: 29 PG (ref 26–33)
MCHC RBC AUTO-ENTMCNC: 33 G/DL (ref 31–36)
MCV RBC AUTO: 87 FL (ref 82–100)
MONOCYTES NFR BLD AUTO: 0.3 /CMM (ref 0.1–1.3)
MONOCYTES NFR BLD AUTO: 4.7 % (ref 2–12)
NEUTROPHILS # BLD AUTO: 5.9 /CMM (ref 1.8–8.9)
NEUTROPHILS NFR BLD AUTO: 81.1 % (ref 43–81)
PHOSPHATE SERPL-MCNC: 4.1 MG/DL (ref 2.5–4.9)
PLATELET # BLD AUTO: 282 /CMM (ref 150–450)
POTASSIUM SERPL-SCNC: 3.7 MMOL/L (ref 3.5–5.1)
PROT SERPL-MCNC: 8.5 G/DL (ref 6.4–8.2)
RBC # BLD AUTO: 4.47 MIL/UL (ref 4–5.2)
RDW COEFFICIENT OF VARIATION: 15.1 (ref 11.5–15)
SODIUM SERPL-SCNC: 143 MMOL/L (ref 136–145)
TRIGL SERPL-MCNC: 77 MG/DL (ref 30–150)
TSH SERPL DL<=0.005 MIU/L-ACNC: 2.4 UIU/ML (ref 0.36–3.74)
WBC NRBC COR # BLD AUTO: 7.3 K/UL (ref 4.3–11)

## 2017-06-20 RX ADMIN — Medication SCH EACH: at 17:36

## 2017-06-20 RX ADMIN — DEXTROSE MONOHYDRATE SCH MLS/HR: 50 INJECTION, SOLUTION INTRAVENOUS at 16:15

## 2017-06-20 RX ADMIN — INSULIN HUMAN PRN UNIT: 100 INJECTION, SOLUTION PARENTERAL at 12:18

## 2017-06-20 RX ADMIN — Medication SCH EACH: at 12:13

## 2017-06-20 RX ADMIN — ASPIRIN 81 MG SCH MG: 81 TABLET ORAL at 09:07

## 2017-06-20 RX ADMIN — Medication SCH EACH: at 21:28

## 2017-06-20 RX ADMIN — MAGNESIUM SULFATE IN DEXTROSE SCH MLS/HR: 10 INJECTION, SOLUTION INTRAVENOUS at 13:24

## 2017-06-20 RX ADMIN — LOSARTAN POTASSIUM SCH MG: 50 TABLET, FILM COATED ORAL at 17:58

## 2017-06-20 RX ADMIN — Medication PRN GM: at 18:00

## 2017-06-20 RX ADMIN — ENOXAPARIN SODIUM SCH MG: 30 INJECTION SUBCUTANEOUS at 20:51

## 2017-06-20 RX ADMIN — INSULIN HUMAN PRN UNIT: 100 INJECTION, SOLUTION PARENTERAL at 21:43

## 2017-06-20 RX ADMIN — METFORMIN HYDROCHLORIDE SCH MG: 500 TABLET, FILM COATED ORAL at 09:00

## 2017-06-20 RX ADMIN — Medication SCH GM: at 18:01

## 2017-06-20 RX ADMIN — Medication SCH EACH: at 05:43

## 2017-06-20 RX ADMIN — Medication PRN GM: at 17:58

## 2017-06-20 RX ADMIN — METOPROLOL SUCCINATE SCH MG: 50 TABLET, EXTENDED RELEASE ORAL at 09:07

## 2017-06-20 RX ADMIN — ATORVASTATIN CALCIUM SCH MG: 10 TABLET, FILM COATED ORAL at 21:28

## 2017-06-20 RX ADMIN — MAGNESIUM SULFATE IN DEXTROSE SCH MLS/HR: 10 INJECTION, SOLUTION INTRAVENOUS at 12:09

## 2017-06-20 RX ADMIN — AMLODIPINE BESYLATE SCH MG: 10 TABLET ORAL at 09:07

## 2017-06-20 RX ADMIN — Medication PRN GM: at 17:59

## 2017-06-20 RX ADMIN — INSULIN DETEMIR SCH UNIT: 100 INJECTION, SOLUTION SUBCUTANEOUS at 21:43

## 2017-06-20 RX ADMIN — METFORMIN HYDROCHLORIDE SCH MG: 500 TABLET, FILM COATED ORAL at 17:58

## 2017-06-20 NOTE — NUR
MS RN NOTE



PATIENT RECEIVED AWAKE AND ALERT X1 IN BED. PT. CONFUSED. SITTER AT BEDSIDE. NO SOB OR 
RESPIRATORY DISTRESS NOTED. NO S/S OF PAIN. IV SITE ON RIGHT HAND  INTACT, WITH NO REDNESS 
OR INFILTRATION NOTED. BED LOCKED AND IN LOWEST POSITION. SIDE RAILS UP, CALL LIGHT WITHIN 
REACH. WILL CONTINUE TO MONITOR.

## 2017-06-20 NOTE — NUR
MS RN NOTE



BLOOD SUGAR WAS 47. ADMINISTERED D50. RE-CHECKED. . PATIENT STABLE. SITTER AT BEDSIDE. 
WILL ENDORSE TO DAY SHIFT FOR ROSSI.

## 2017-06-20 NOTE — NUR
WOUND CARE CONSULT: LIMITED ASSESSMENT DUE TO PT REFUSING TO HAVE SOCKS REMOVED. PT BECOMES 
AGITATED AND IS COMBATIVE AT TIMES. RT BUTTOCK STAGE II ULCER NOTED TO BE PRESENT ON 
ADMISSION. LEFT BUTTOCK SCARRING/STAINING OF SKIN NOTED. CRISS SCORE IS 17. RECOMMEND 
ISOFLEX LOW AIRLOSS BED, TO BE PLACED WHEN AVAILABLE. DISCUSSED ALL SKIN PROTECTION AND 
WOUND RECOMMENDATIONS WITH NURSING STAFF. WILL SEE PRN. MD IN AGREEMENT WITH PLAN OF CARE. 

-------------------------------------------------------------------------------

Addendum: 06/20/17 at 1122 by TIFFANY HAMPTON WNDNU

-------------------------------------------------------------------------------

Amended: Links added.

## 2017-06-21 VITALS — DIASTOLIC BLOOD PRESSURE: 78 MMHG | SYSTOLIC BLOOD PRESSURE: 155 MMHG

## 2017-06-21 VITALS — DIASTOLIC BLOOD PRESSURE: 73 MMHG | SYSTOLIC BLOOD PRESSURE: 156 MMHG

## 2017-06-21 VITALS — DIASTOLIC BLOOD PRESSURE: 67 MMHG | SYSTOLIC BLOOD PRESSURE: 136 MMHG

## 2017-06-21 LAB
ALBUMIN SERPL BCP-MCNC: 3 G/DL (ref 3.4–5)
ALBUMIN SERPL ELPH-MCNC: 3.8 G/DL (ref 2.9–4.4)
ALBUMIN/GLOB SERPL: 0.9 {RATIO} (ref 0.7–1.7)
ALP SERPL-CCNC: 65 U/L (ref 46–116)
ALPHA1 GLOB SERPL ELPH-MCNC: 0.3 G/DL (ref 0–0.4)
ALPHA2 GLOB SERPL ELPH-MCNC: 1 G/DL (ref 0.4–1)
ALT SERPL W P-5'-P-CCNC: 16 U/L (ref 12–78)
AST SERPL W P-5'-P-CCNC: 21 U/L (ref 15–37)
B-GLOBULIN SERPL ELPH-MCNC: 1.1 G/DL (ref 0.7–1.3)
BASOPHILS # BLD AUTO: 0 /CMM (ref 0–0.2)
BASOPHILS NFR BLD AUTO: 0.5 % (ref 0–2)
BILIRUB SERPL-MCNC: 0.4 MG/DL (ref 0.2–1)
BUN SERPL-MCNC: 15 MG/DL (ref 7–18)
CALCIUM SERPL-MCNC: 8.5 MG/DL (ref 8.5–10.1)
CHLORIDE SERPL-SCNC: 107 MMOL/L (ref 98–107)
CO2 SERPL-SCNC: 23 MMOL/L (ref 21–32)
CREAT SERPL-MCNC: 0.8 MG/DL (ref 0.6–1.3)
EOSINOPHIL # BLD AUTO: 0.1 /CMM (ref 0–0.7)
EOSINOPHIL NFR BLD AUTO: 2.4 % (ref 0–6)
GAMMA GLOB SERPL ELPH-MCNC: 1.7 G/DL (ref 0.4–1.8)
GLOBULIN SER CALC-MCNC: 4.1 G/DL (ref 2.2–3.9)
GLUCOSE SERPL-MCNC: 113 MG/DL (ref 74–106)
HCT VFR BLD AUTO: 34 % (ref 33–45)
HGB BLD-MCNC: 11.5 G/DL (ref 11.5–14.8)
LYMPHOCYTES NFR BLD AUTO: 1.1 /CMM (ref 0.8–4.8)
LYMPHOCYTES NFR BLD AUTO: 19.9 % (ref 20–44)
MCH RBC QN AUTO: 29 PG (ref 26–33)
MCHC RBC AUTO-ENTMCNC: 34 G/DL (ref 31–36)
MCV RBC AUTO: 85 FL (ref 82–100)
MONOCYTES NFR BLD AUTO: 0.3 /CMM (ref 0.1–1.3)
MONOCYTES NFR BLD AUTO: 6.1 % (ref 2–12)
NEUTROPHILS # BLD AUTO: 4.1 /CMM (ref 1.8–8.9)
NEUTROPHILS NFR BLD AUTO: 71.1 % (ref 43–81)
PHOSPHATE SERPL-MCNC: 3.4 MG/DL (ref 2.5–4.9)
PLATELET # BLD AUTO: 254 /CMM (ref 150–450)
POTASSIUM SERPL-SCNC: 3.9 MMOL/L (ref 3.5–5.1)
PROT SERPL-MCNC: 7.4 G/DL (ref 6.4–8.2)
PROT SERPL-MCNC: 7.9 G/DL (ref 6–8.5)
RBC # BLD AUTO: 4.01 MIL/UL (ref 4–5.2)
RDW COEFFICIENT OF VARIATION: 15.1 (ref 11.5–15)
SODIUM SERPL-SCNC: 142 MMOL/L (ref 136–145)
WBC NRBC COR # BLD AUTO: 5.7 K/UL (ref 4.3–11)

## 2017-06-21 RX ADMIN — LOSARTAN POTASSIUM SCH MG: 50 TABLET, FILM COATED ORAL at 11:18

## 2017-06-21 RX ADMIN — Medication SCH EACH: at 21:21

## 2017-06-21 RX ADMIN — METFORMIN HYDROCHLORIDE SCH MG: 500 TABLET, FILM COATED ORAL at 16:39

## 2017-06-21 RX ADMIN — INSULIN HUMAN PRN UNIT: 100 INJECTION, SOLUTION PARENTERAL at 16:57

## 2017-06-21 RX ADMIN — Medication PRN GM: at 11:19

## 2017-06-21 RX ADMIN — SODIUM CHLORIDE PRN MLS/HR: 9 INJECTION, SOLUTION INTRAVENOUS at 04:34

## 2017-06-21 RX ADMIN — INSULIN HUMAN PRN UNIT: 100 INJECTION, SOLUTION PARENTERAL at 21:29

## 2017-06-21 RX ADMIN — ASPIRIN 81 MG SCH MG: 81 TABLET ORAL at 11:17

## 2017-06-21 RX ADMIN — DEXTROSE MONOHYDRATE SCH MLS/HR: 50 INJECTION, SOLUTION INTRAVENOUS at 13:04

## 2017-06-21 RX ADMIN — ENOXAPARIN SODIUM SCH MG: 30 INJECTION SUBCUTANEOUS at 20:01

## 2017-06-21 RX ADMIN — Medication SCH GM: at 11:20

## 2017-06-21 RX ADMIN — METFORMIN HYDROCHLORIDE SCH MG: 500 TABLET, FILM COATED ORAL at 11:19

## 2017-06-21 RX ADMIN — ATORVASTATIN CALCIUM SCH MG: 10 TABLET, FILM COATED ORAL at 21:21

## 2017-06-21 RX ADMIN — Medication SCH EACH: at 07:30

## 2017-06-21 RX ADMIN — INSULIN HUMAN PRN UNIT: 100 INJECTION, SOLUTION PARENTERAL at 13:23

## 2017-06-21 RX ADMIN — AMLODIPINE BESYLATE SCH MG: 10 TABLET ORAL at 11:17

## 2017-06-21 RX ADMIN — LORAZEPAM PRN MG: 2 INJECTION INTRAMUSCULAR; INTRAVENOUS at 16:02

## 2017-06-21 RX ADMIN — LORAZEPAM PRN MG: 2 INJECTION INTRAMUSCULAR; INTRAVENOUS at 11:22

## 2017-06-21 RX ADMIN — METOPROLOL SUCCINATE SCH MG: 50 TABLET, EXTENDED RELEASE ORAL at 11:17

## 2017-06-21 RX ADMIN — Medication SCH EACH: at 12:00

## 2017-06-21 RX ADMIN — Medication SCH EACH: at 16:53

## 2017-06-21 RX ADMIN — INSULIN DETEMIR SCH UNIT: 100 INJECTION, SOLUTION SUBCUTANEOUS at 21:29
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
## 2017-06-21 NOTE — NUR
MS RN CLOSING NOTES 

PT IS IN BED SLEEPING. SLEPT THROUGHOUT THE NIGHT, NO PRN MEDICATIONS NEEDED. NO SIGNS OF 
SOB OR PAIN NOTED. SNACKS WERE GIVEN BEFORE BEDTIME AND BLOOD SUGAR AT 0630 , NO 
COVERAGE WAS NEEDED. WILL ENDORSE TO NIGHT SHIFT.

## 2017-06-21 NOTE — NUR
GLUCOSE 

BS , 3 UNITS OF INSULIN WERE GIVEN. LEVEMIR WAS ADMINISTERED. 

PT WAS GIVEN PUDDING AND APPLE JUICE TO PREVENT HYPOGLYCEMIA. WILL CONTINUE TO MONITOR FOR 
S/S OF HYPOGLYCEMIA.

## 2017-06-21 NOTE — NUR
MS RN NOTE



PATIENT RECEIVED AWAKE AND ALERT X1 IN BED. PT. CONFUSED. SITTER AT BEDSIDE. NO SOB OR 
RESPIRATORY DISTRESS NOTED. NO S/S OF PAIN. IV SITE ON RIGHT HAND  INTACT, WITH NO REDNESS 
OR INFILTRATION NOTED, INFUSING WELL. BED LOCKED AND IN LOWEST POSITION. SIDE RAILS UP, CALL 
LIGHT WITHIN REACH. WILL CONTINUE TO MONITOR.
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
## 2017-06-22 VITALS — SYSTOLIC BLOOD PRESSURE: 127 MMHG | DIASTOLIC BLOOD PRESSURE: 62 MMHG

## 2017-06-22 VITALS — DIASTOLIC BLOOD PRESSURE: 62 MMHG | SYSTOLIC BLOOD PRESSURE: 127 MMHG

## 2017-06-22 LAB
ALBUMIN SERPL BCP-MCNC: 2.9 G/DL (ref 3.4–5)
ALP SERPL-CCNC: 62 U/L (ref 46–116)
ALT SERPL W P-5'-P-CCNC: 16 U/L (ref 12–78)
AST SERPL W P-5'-P-CCNC: 19 U/L (ref 15–37)
BASOPHILS # BLD AUTO: 0 /CMM (ref 0–0.2)
BASOPHILS NFR BLD AUTO: 0.6 % (ref 0–2)
BILIRUB SERPL-MCNC: 0.4 MG/DL (ref 0.2–1)
BUN SERPL-MCNC: 17 MG/DL (ref 7–18)
CALCIUM SERPL-MCNC: 8.5 MG/DL (ref 8.5–10.1)
CHLORIDE SERPL-SCNC: 108 MMOL/L (ref 98–107)
CO2 SERPL-SCNC: 30 MMOL/L (ref 21–32)
CREAT SERPL-MCNC: 0.8 MG/DL (ref 0.6–1.3)
EOSINOPHIL # BLD AUTO: 0.2 /CMM (ref 0–0.7)
EOSINOPHIL NFR BLD AUTO: 3 % (ref 0–6)
GLUCOSE SERPL-MCNC: 107 MG/DL (ref 74–106)
HCT VFR BLD AUTO: 34 % (ref 33–45)
HGB BLD-MCNC: 11.2 G/DL (ref 11.5–14.8)
LYMPHOCYTES NFR BLD AUTO: 1.6 /CMM (ref 0.8–4.8)
LYMPHOCYTES NFR BLD AUTO: 29.1 % (ref 20–44)
MAGNESIUM SERPL-MCNC: 1.8 MG/DL (ref 1.8–2.4)
MCH RBC QN AUTO: 29 PG (ref 26–33)
MCHC RBC AUTO-ENTMCNC: 33 G/DL (ref 31–36)
MCV RBC AUTO: 86 FL (ref 82–100)
MONOCYTES NFR BLD AUTO: 0.5 /CMM (ref 0.1–1.3)
MONOCYTES NFR BLD AUTO: 8.3 % (ref 2–12)
NEUTROPHILS # BLD AUTO: 3.3 /CMM (ref 1.8–8.9)
NEUTROPHILS NFR BLD AUTO: 59 % (ref 43–81)
PHOSPHATE SERPL-MCNC: 3.6 MG/DL (ref 2.5–4.9)
PLATELET # BLD AUTO: 250 /CMM (ref 150–450)
POTASSIUM SERPL-SCNC: 3.9 MMOL/L (ref 3.5–5.1)
PROT SERPL-MCNC: 7.2 G/DL (ref 6.4–8.2)
RBC # BLD AUTO: 3.91 MIL/UL (ref 4–5.2)
RDW COEFFICIENT OF VARIATION: 14.9 (ref 11.5–15)
SODIUM SERPL-SCNC: 143 MMOL/L (ref 136–145)
WBC NRBC COR # BLD AUTO: 5.5 K/UL (ref 4.3–11)

## 2017-06-22 RX ADMIN — SODIUM CHLORIDE PRN MLS/HR: 9 INJECTION, SOLUTION INTRAVENOUS at 03:35

## 2017-06-22 RX ADMIN — METFORMIN HYDROCHLORIDE SCH MG: 500 TABLET, FILM COATED ORAL at 08:19

## 2017-06-22 RX ADMIN — Medication SCH EACH: at 07:15

## 2017-06-22 RX ADMIN — ASPIRIN 81 MG SCH MG: 81 TABLET ORAL at 08:20

## 2017-06-22 RX ADMIN — LOSARTAN POTASSIUM SCH MG: 50 TABLET, FILM COATED ORAL at 08:20

## 2017-06-22 RX ADMIN — LORAZEPAM PRN MG: 2 INJECTION INTRAMUSCULAR; INTRAVENOUS at 10:58

## 2017-06-22 RX ADMIN — DEXTROSE MONOHYDRATE SCH MLS/HR: 50 INJECTION, SOLUTION INTRAVENOUS at 12:09

## 2017-06-22 RX ADMIN — INSULIN HUMAN PRN UNIT: 100 INJECTION, SOLUTION PARENTERAL at 12:19

## 2017-06-22 RX ADMIN — Medication SCH EACH: at 12:13

## 2017-06-22 RX ADMIN — METOPROLOL SUCCINATE SCH MG: 50 TABLET, EXTENDED RELEASE ORAL at 08:21

## 2017-06-22 RX ADMIN — Medication SCH GM: at 08:21

## 2017-06-22 RX ADMIN — AMLODIPINE BESYLATE SCH MG: 10 TABLET ORAL at 08:20

## 2017-06-22 NOTE — NUR
MS RN CLOSING NOTE

PT IS IN BED AWAKE AND ALERT. SLEPT THROUGHOUT THE NIGHT, NO PRN MEDICATIONS NEEDED. NO 
SIGNS OF SOB OR PAIN NOTED. SNACKS WERE GIVEN BEFORE BEDTIME AFTER INSULIN AND LEVEMIR WERE 
ADMINISTERED, AT 0630 BS - NO COVERAGE NEEDED. ECHOCARDIOGRAM WAS DONE. WILL ENDORSE 
TO NIGHT SHIFT.

## 2017-06-22 NOTE — NUR
RN NOTES



PT TO BE DISCHARGED TODAY, CALLED SON NASRA FLORES AND SAID THAT HE WILL COME TO PICK-UP PT 
B/W 1400-1500H, ALSO REQUESTED TO HAVE PT TAKE PNA VACCINE.

## 2017-06-22 NOTE — NUR
MS RN OPENING NOTES



RECEIVED PATIENT IN BED AWAKE AND ALERT WITH SITTER AT BEDSIDE. NO S/S OF PAIN OR 
DISCOMFORTS NOTED AT THIS TIME. ON ROOM AIR, RESPIRATION EVEN WITH NO SOB NOTED. IV ACCESS 
ON RIGHT FA INTACT AND PATENT WITH IVF OF NS @ 75ML/HR INFUSING WELL, NO SIGNS OF 
INFILTRATION NOTED. BED AT LOWEST POSITION, LOCKED WITH SIDE-RAILS UP AS APPROPRIATE. CALL 
LIGHT WITHIN REACH. ALL SAFETY PRECAUTIONS MAINTAINED. WILL CONTINUE TO MONITOR ACCORDINGLY.

## 2017-06-22 NOTE — NUR
RN DISCHARGED NOTES



PATIENT DISCHARGED HOME AND LEFT UNIT IN STABLE CONDITION AT 1330H VIA WHEELCHAIR 
ACCOMPANIED BY FAMILY. ALERT AND ORIENTED X2, NO SIGNS OF PAIN OR DISCOMFORTS AT TIME OF 
DISCHARGE. PNEUMO VACCINE GIVEN. V/S TAKEN AND RECORDED. PHOTOS OF SKIN WOUNDS TAKEN AND 
FILED ON CHART. PATIENT ADMITTED WITH NO BELONGINGS, FORMED UN-ABLE TO SIGN BY PT SO 2 STAFF 
SIGNED THE FORM AND FILED ON CHART. HEALTH TEACHINGS GIVEN TO FAMILY AND VERBALIZED 
UNDERSTANDING. MD AND CHARGE NURSE AWARE OF DISCHARGE.

## 2017-12-13 ENCOUNTER — HOSPITAL ENCOUNTER (INPATIENT)
Dept: HOSPITAL 54 - ER | Age: 82
LOS: 6 days | Discharge: HOME HEALTH | DRG: 689 | End: 2017-12-19
Attending: INTERNAL MEDICINE | Admitting: INTERNAL MEDICINE
Payer: MEDICARE

## 2017-12-13 VITALS — BODY MASS INDEX: 20.55 KG/M2 | WEIGHT: 135.56 LBS | HEIGHT: 68 IN

## 2017-12-13 VITALS — SYSTOLIC BLOOD PRESSURE: 141 MMHG | DIASTOLIC BLOOD PRESSURE: 82 MMHG

## 2017-12-13 VITALS — DIASTOLIC BLOOD PRESSURE: 84 MMHG | SYSTOLIC BLOOD PRESSURE: 126 MMHG

## 2017-12-13 DIAGNOSIS — R53.81: ICD-10-CM

## 2017-12-13 DIAGNOSIS — Z91.81: ICD-10-CM

## 2017-12-13 DIAGNOSIS — Z79.4: ICD-10-CM

## 2017-12-13 DIAGNOSIS — Z87.440: ICD-10-CM

## 2017-12-13 DIAGNOSIS — E11.65: ICD-10-CM

## 2017-12-13 DIAGNOSIS — I10: ICD-10-CM

## 2017-12-13 DIAGNOSIS — N17.0: ICD-10-CM

## 2017-12-13 DIAGNOSIS — N39.0: Primary | ICD-10-CM

## 2017-12-13 DIAGNOSIS — G93.40: ICD-10-CM

## 2017-12-13 DIAGNOSIS — E78.5: ICD-10-CM

## 2017-12-13 DIAGNOSIS — B96.20: ICD-10-CM

## 2017-12-13 DIAGNOSIS — K21.9: ICD-10-CM

## 2017-12-13 DIAGNOSIS — F02.80: ICD-10-CM

## 2017-12-13 DIAGNOSIS — G30.9: ICD-10-CM

## 2017-12-13 LAB
APPEARANCE UR: (no result)
APTT PPP: 26 SEC (ref 23–34)
BASOPHILS # BLD AUTO: 0 /CMM (ref 0–0.2)
BASOPHILS NFR BLD AUTO: 0.4 % (ref 0–2)
BILIRUB UR QL STRIP: NEGATIVE
BUN SERPL-MCNC: 42 MG/DL (ref 7–18)
CALCIUM SERPL-MCNC: 9.6 MG/DL (ref 8.5–10.1)
CHLORIDE SERPL-SCNC: 108 MMOL/L (ref 98–107)
CO2 SERPL-SCNC: 23 MMOL/L (ref 21–32)
COLOR UR: YELLOW
CREAT SERPL-MCNC: 1.3 MG/DL (ref 0.6–1.3)
EOSINOPHIL # BLD AUTO: 0.1 /CMM (ref 0–0.7)
EOSINOPHIL NFR BLD AUTO: 1.5 % (ref 0–6)
GLUCOSE SERPL-MCNC: 382 MG/DL (ref 74–106)
GLUCOSE UR STRIP-MCNC: (no result) MG/DL
HCT VFR BLD AUTO: 42 % (ref 33–45)
HGB BLD-MCNC: 13.8 G/DL (ref 11.5–14.8)
HGB UR QL STRIP: (no result) ERY/UL
INR PPP: 0.96 (ref 0.87–1.13)
KETONES UR STRIP-MCNC: NEGATIVE MG/DL
LEUKOCYTE ESTERASE UR QL STRIP: (no result)
LYMPHOCYTES NFR BLD AUTO: 1.5 /CMM (ref 0.8–4.8)
LYMPHOCYTES NFR BLD AUTO: 19.4 % (ref 20–44)
MCH RBC QN AUTO: 28 PG (ref 26–33)
MCHC RBC AUTO-ENTMCNC: 33 G/DL (ref 31–36)
MCV RBC AUTO: 86 FL (ref 82–100)
MONOCYTES NFR BLD AUTO: 0.5 /CMM (ref 0.1–1.3)
MONOCYTES NFR BLD AUTO: 6.8 % (ref 2–12)
NEUTROPHILS # BLD AUTO: 5.5 /CMM (ref 1.8–8.9)
NEUTROPHILS NFR BLD AUTO: 71.9 % (ref 43–81)
NITRITE UR QL STRIP: NEGATIVE
PH UR STRIP: 6 [PH] (ref 5–8)
PLATELET # BLD AUTO: 289 /CMM (ref 150–450)
POTASSIUM SERPL-SCNC: 4.7 MMOL/L (ref 3.5–5.1)
PROT UR QL STRIP: (no result) MG/DL
PROTHROMBIN TIME: 10 SECS (ref 9.5–12.7)
RBC # BLD AUTO: 4.85 MIL/UL (ref 4–5.2)
RBC #/AREA URNS HPF: (no result) /HPF (ref 0–2)
RDW COEFFICIENT OF VARIATION: 14.4 (ref 11.5–15)
SODIUM SERPL-SCNC: 143 MMOL/L (ref 136–145)
TROPONIN I SERPL-MCNC: < 0.017 NG/ML (ref 0–0.06)
UROBILINOGEN UR STRIP-MCNC: 0.2 EU/DL
WBC #/AREA URNS HPF: (no result) /HPF (ref 0–3)
WBC NRBC COR # BLD AUTO: 7.6 K/UL (ref 4.3–11)

## 2017-12-13 PROCEDURE — Z7610: HCPCS

## 2017-12-13 RX ADMIN — DEXTROSE MONOHYDRATE SCH MLS/HR: 50 INJECTION, SOLUTION INTRAVENOUS at 16:30

## 2017-12-13 RX ADMIN — METFORMIN HYDROCHLORIDE SCH MG: 500 TABLET, FILM COATED ORAL at 17:00

## 2017-12-13 RX ADMIN — SODIUM CHLORIDE PRN MLS/HR: 9 INJECTION, SOLUTION INTRAVENOUS at 22:42

## 2017-12-13 RX ADMIN — AMLODIPINE BESYLATE SCH MG: 10 TABLET ORAL at 15:30

## 2017-12-13 RX ADMIN — ENOXAPARIN SODIUM SCH MG: 30 INJECTION SUBCUTANEOUS at 15:30

## 2017-12-14 VITALS — SYSTOLIC BLOOD PRESSURE: 143 MMHG | DIASTOLIC BLOOD PRESSURE: 68 MMHG

## 2017-12-14 VITALS — SYSTOLIC BLOOD PRESSURE: 146 MMHG | DIASTOLIC BLOOD PRESSURE: 77 MMHG

## 2017-12-14 VITALS — SYSTOLIC BLOOD PRESSURE: 128 MMHG | DIASTOLIC BLOOD PRESSURE: 70 MMHG

## 2017-12-14 VITALS — SYSTOLIC BLOOD PRESSURE: 139 MMHG | DIASTOLIC BLOOD PRESSURE: 70 MMHG

## 2017-12-14 VITALS — DIASTOLIC BLOOD PRESSURE: 69 MMHG | SYSTOLIC BLOOD PRESSURE: 131 MMHG

## 2017-12-14 VITALS — SYSTOLIC BLOOD PRESSURE: 113 MMHG | DIASTOLIC BLOOD PRESSURE: 57 MMHG

## 2017-12-14 LAB
ALBUMIN SERPL BCP-MCNC: 3.3 G/DL (ref 3.4–5)
ALP SERPL-CCNC: 79 U/L (ref 46–116)
ALT SERPL W P-5'-P-CCNC: 15 U/L (ref 12–78)
AST SERPL W P-5'-P-CCNC: 22 U/L (ref 15–37)
BASOPHILS # BLD AUTO: 0 /CMM (ref 0–0.2)
BASOPHILS NFR BLD AUTO: 0.7 % (ref 0–2)
BILIRUB SERPL-MCNC: 0.5 MG/DL (ref 0.2–1)
BUN SERPL-MCNC: 34 MG/DL (ref 7–18)
CALCIUM SERPL-MCNC: 9.2 MG/DL (ref 8.5–10.1)
CHLORIDE SERPL-SCNC: 109 MMOL/L (ref 98–107)
CHOLEST SERPL-MCNC: 146 MG/DL (ref ?–200)
CO2 SERPL-SCNC: 21 MMOL/L (ref 21–32)
CREAT SERPL-MCNC: 1.2 MG/DL (ref 0.6–1.3)
EOSINOPHIL # BLD AUTO: 0.1 /CMM (ref 0–0.7)
EOSINOPHIL NFR BLD AUTO: 2.8 % (ref 0–6)
GLUCOSE SERPL-MCNC: 342 MG/DL (ref 74–106)
HCT VFR BLD AUTO: 38 % (ref 33–45)
HDLC SERPL-MCNC: 54 MG/DL (ref 40–60)
HGB BLD-MCNC: 12.5 G/DL (ref 11.5–14.8)
LDLC SERPL DIRECT ASSAY-MCNC: 80 MG/DL (ref 0–99)
LYMPHOCYTES NFR BLD AUTO: 1.9 /CMM (ref 0.8–4.8)
LYMPHOCYTES NFR BLD AUTO: 42.5 % (ref 20–44)
MAGNESIUM SERPL-MCNC: 1.7 MG/DL (ref 1.8–2.4)
MCH RBC QN AUTO: 28 PG (ref 26–33)
MCHC RBC AUTO-ENTMCNC: 33 G/DL (ref 31–36)
MCV RBC AUTO: 86 FL (ref 82–100)
MONOCYTES NFR BLD AUTO: 0.5 /CMM (ref 0.1–1.3)
MONOCYTES NFR BLD AUTO: 10.7 % (ref 2–12)
NEUTROPHILS # BLD AUTO: 2 /CMM (ref 1.8–8.9)
NEUTROPHILS NFR BLD AUTO: 43.3 % (ref 43–81)
PHOSPHATE SERPL-MCNC: 3 MG/DL (ref 2.5–4.9)
PLATELET # BLD AUTO: 240 /CMM (ref 150–450)
POTASSIUM SERPL-SCNC: 4.3 MMOL/L (ref 3.5–5.1)
PROT SERPL-MCNC: 8.1 G/DL (ref 6.4–8.2)
RBC # BLD AUTO: 4.44 MIL/UL (ref 4–5.2)
RDW COEFFICIENT OF VARIATION: 14.3 (ref 11.5–15)
SODIUM SERPL-SCNC: 143 MMOL/L (ref 136–145)
TRIGL SERPL-MCNC: 89 MG/DL (ref 30–150)
WBC NRBC COR # BLD AUTO: 4.5 K/UL (ref 4.3–11)

## 2017-12-14 RX ADMIN — Medication SCH EACH: at 18:12

## 2017-12-14 RX ADMIN — METOPROLOL SUCCINATE SCH MG: 50 TABLET, EXTENDED RELEASE ORAL at 08:50

## 2017-12-14 RX ADMIN — ASPIRIN 81 MG SCH MG: 81 TABLET ORAL at 08:48

## 2017-12-14 RX ADMIN — METFORMIN HYDROCHLORIDE SCH MG: 500 TABLET, FILM COATED ORAL at 18:13

## 2017-12-14 RX ADMIN — AMLODIPINE BESYLATE SCH MG: 10 TABLET ORAL at 08:49

## 2017-12-14 RX ADMIN — LOSARTAN POTASSIUM SCH MG: 50 TABLET, FILM COATED ORAL at 08:49

## 2017-12-14 RX ADMIN — METFORMIN HYDROCHLORIDE SCH MG: 500 TABLET, FILM COATED ORAL at 08:48

## 2017-12-14 RX ADMIN — MAGNESIUM SULFATE IN DEXTROSE SCH MLS/HR: 10 INJECTION, SOLUTION INTRAVENOUS at 12:32

## 2017-12-14 RX ADMIN — Medication SCH EACH: at 21:36

## 2017-12-14 RX ADMIN — ATORVASTATIN CALCIUM SCH MG: 10 TABLET, FILM COATED ORAL at 08:48

## 2017-12-14 RX ADMIN — MAGNESIUM SULFATE IN DEXTROSE SCH MLS/HR: 10 INJECTION, SOLUTION INTRAVENOUS at 12:33

## 2017-12-14 RX ADMIN — INSULIN HUMAN PRN UNIT: 100 INJECTION, SOLUTION PARENTERAL at 21:37

## 2017-12-14 RX ADMIN — DEXTROSE MONOHYDRATE SCH MLS/HR: 50 INJECTION, SOLUTION INTRAVENOUS at 18:12

## 2017-12-14 RX ADMIN — INSULIN HUMAN PRN UNIT: 100 INJECTION, SOLUTION PARENTERAL at 18:28

## 2017-12-14 RX ADMIN — ENOXAPARIN SODIUM SCH MG: 30 INJECTION SUBCUTANEOUS at 08:51

## 2017-12-14 RX ADMIN — INSULIN HUMAN PRN UNIT: 100 INJECTION, SOLUTION PARENTERAL at 18:09

## 2017-12-15 VITALS — SYSTOLIC BLOOD PRESSURE: 132 MMHG | DIASTOLIC BLOOD PRESSURE: 62 MMHG

## 2017-12-15 VITALS — SYSTOLIC BLOOD PRESSURE: 137 MMHG | DIASTOLIC BLOOD PRESSURE: 69 MMHG

## 2017-12-15 VITALS — DIASTOLIC BLOOD PRESSURE: 60 MMHG | SYSTOLIC BLOOD PRESSURE: 124 MMHG

## 2017-12-15 VITALS — DIASTOLIC BLOOD PRESSURE: 60 MMHG | SYSTOLIC BLOOD PRESSURE: 139 MMHG

## 2017-12-15 VITALS — DIASTOLIC BLOOD PRESSURE: 62 MMHG | SYSTOLIC BLOOD PRESSURE: 115 MMHG

## 2017-12-15 VITALS — SYSTOLIC BLOOD PRESSURE: 100 MMHG | DIASTOLIC BLOOD PRESSURE: 65 MMHG

## 2017-12-15 LAB
BUN SERPL-MCNC: 33 MG/DL (ref 7–18)
CALCIUM SERPL-MCNC: 9.5 MG/DL (ref 8.5–10.1)
CHLORIDE SERPL-SCNC: 108 MMOL/L (ref 98–107)
CO2 SERPL-SCNC: 25 MMOL/L (ref 21–32)
CREAT SERPL-MCNC: 1.2 MG/DL (ref 0.6–1.3)
GLUCOSE SERPL-MCNC: 197 MG/DL (ref 74–106)
MAGNESIUM SERPL-MCNC: 2.1 MG/DL (ref 1.8–2.4)
POTASSIUM SERPL-SCNC: 4.4 MMOL/L (ref 3.5–5.1)
SODIUM SERPL-SCNC: 143 MMOL/L (ref 136–145)

## 2017-12-15 RX ADMIN — Medication SCH EACH: at 22:01

## 2017-12-15 RX ADMIN — ASPIRIN 81 MG SCH MG: 81 TABLET ORAL at 10:27

## 2017-12-15 RX ADMIN — SODIUM CHLORIDE PRN MLS/HR: 9 INJECTION, SOLUTION INTRAVENOUS at 04:06

## 2017-12-15 RX ADMIN — INSULIN HUMAN PRN UNIT: 100 INJECTION, SOLUTION PARENTERAL at 12:15

## 2017-12-15 RX ADMIN — AMLODIPINE BESYLATE SCH MG: 10 TABLET ORAL at 10:26

## 2017-12-15 RX ADMIN — LOSARTAN POTASSIUM SCH MG: 50 TABLET, FILM COATED ORAL at 10:26

## 2017-12-15 RX ADMIN — Medication SCH EACH: at 12:16

## 2017-12-15 RX ADMIN — ATORVASTATIN CALCIUM SCH MG: 10 TABLET, FILM COATED ORAL at 10:26

## 2017-12-15 RX ADMIN — METFORMIN HYDROCHLORIDE SCH MG: 500 TABLET, FILM COATED ORAL at 10:25

## 2017-12-15 RX ADMIN — INSULIN HUMAN PRN UNIT: 100 INJECTION, SOLUTION PARENTERAL at 22:03

## 2017-12-15 RX ADMIN — DEXTROSE MONOHYDRATE SCH MLS/HR: 50 INJECTION, SOLUTION INTRAVENOUS at 16:34

## 2017-12-15 RX ADMIN — Medication SCH EACH: at 07:26

## 2017-12-15 RX ADMIN — Medication SCH EACH: at 17:36

## 2017-12-15 RX ADMIN — METOPROLOL SUCCINATE SCH MG: 50 TABLET, EXTENDED RELEASE ORAL at 10:27

## 2017-12-15 RX ADMIN — INSULIN HUMAN PRN UNIT: 100 INJECTION, SOLUTION PARENTERAL at 17:36

## 2017-12-15 RX ADMIN — ENOXAPARIN SODIUM SCH MG: 30 INJECTION SUBCUTANEOUS at 10:29

## 2017-12-15 RX ADMIN — INSULIN HUMAN PRN UNIT: 100 INJECTION, SOLUTION PARENTERAL at 08:08

## 2017-12-15 RX ADMIN — METFORMIN HYDROCHLORIDE SCH MG: 500 TABLET, FILM COATED ORAL at 16:44

## 2017-12-15 RX ADMIN — INSULIN HUMAN PRN UNIT: 100 INJECTION, SOLUTION PARENTERAL at 07:28

## 2017-12-16 VITALS — DIASTOLIC BLOOD PRESSURE: 60 MMHG | SYSTOLIC BLOOD PRESSURE: 111 MMHG

## 2017-12-16 VITALS — DIASTOLIC BLOOD PRESSURE: 122 MMHG | SYSTOLIC BLOOD PRESSURE: 134 MMHG

## 2017-12-16 VITALS — SYSTOLIC BLOOD PRESSURE: 117 MMHG | DIASTOLIC BLOOD PRESSURE: 61 MMHG

## 2017-12-16 VITALS — SYSTOLIC BLOOD PRESSURE: 110 MMHG | DIASTOLIC BLOOD PRESSURE: 91 MMHG

## 2017-12-16 VITALS — SYSTOLIC BLOOD PRESSURE: 130 MMHG | DIASTOLIC BLOOD PRESSURE: 75 MMHG

## 2017-12-16 VITALS — SYSTOLIC BLOOD PRESSURE: 134 MMHG | DIASTOLIC BLOOD PRESSURE: 68 MMHG

## 2017-12-16 RX ADMIN — Medication SCH EACH: at 16:33

## 2017-12-16 RX ADMIN — MUPIROCIN SCH GM: 20 OINTMENT TOPICAL at 21:31

## 2017-12-16 RX ADMIN — SODIUM CHLORIDE PRN MLS/HR: 9 INJECTION, SOLUTION INTRAVENOUS at 02:36

## 2017-12-16 RX ADMIN — ENOXAPARIN SODIUM SCH MG: 30 INJECTION SUBCUTANEOUS at 08:50

## 2017-12-16 RX ADMIN — Medication SCH EACH: at 11:05

## 2017-12-16 RX ADMIN — ATORVASTATIN CALCIUM SCH MG: 10 TABLET, FILM COATED ORAL at 08:48

## 2017-12-16 RX ADMIN — INSULIN HUMAN PRN UNIT: 100 INJECTION, SOLUTION PARENTERAL at 08:47

## 2017-12-16 RX ADMIN — INSULIN HUMAN PRN UNIT: 100 INJECTION, SOLUTION PARENTERAL at 21:42

## 2017-12-16 RX ADMIN — MUPIROCIN SCH GM: 20 OINTMENT TOPICAL at 14:49

## 2017-12-16 RX ADMIN — Medication SCH EACH: at 08:45

## 2017-12-16 RX ADMIN — AMLODIPINE BESYLATE SCH MG: 10 TABLET ORAL at 08:48

## 2017-12-16 RX ADMIN — METOPROLOL SUCCINATE SCH MG: 50 TABLET, EXTENDED RELEASE ORAL at 08:48

## 2017-12-16 RX ADMIN — ZOLPIDEM TARTRATE PRN MG: 5 TABLET, FILM COATED ORAL at 21:42

## 2017-12-16 RX ADMIN — DEXTROSE MONOHYDRATE SCH MLS/HR: 50 INJECTION, SOLUTION INTRAVENOUS at 15:30

## 2017-12-16 RX ADMIN — Medication SCH EACH: at 21:31

## 2017-12-16 RX ADMIN — ASPIRIN 81 MG SCH MG: 81 TABLET ORAL at 08:49

## 2017-12-16 RX ADMIN — METFORMIN HYDROCHLORIDE SCH MG: 500 TABLET, FILM COATED ORAL at 16:33

## 2017-12-16 RX ADMIN — INSULIN HUMAN PRN UNIT: 100 INJECTION, SOLUTION PARENTERAL at 10:56

## 2017-12-16 RX ADMIN — LOSARTAN POTASSIUM SCH MG: 50 TABLET, FILM COATED ORAL at 08:48

## 2017-12-16 RX ADMIN — INSULIN HUMAN PRN UNIT: 100 INJECTION, SOLUTION PARENTERAL at 16:34

## 2017-12-16 RX ADMIN — METFORMIN HYDROCHLORIDE SCH MG: 500 TABLET, FILM COATED ORAL at 08:49

## 2017-12-17 VITALS — DIASTOLIC BLOOD PRESSURE: 73 MMHG | SYSTOLIC BLOOD PRESSURE: 154 MMHG

## 2017-12-17 VITALS — DIASTOLIC BLOOD PRESSURE: 82 MMHG | SYSTOLIC BLOOD PRESSURE: 131 MMHG

## 2017-12-17 VITALS — DIASTOLIC BLOOD PRESSURE: 80 MMHG | SYSTOLIC BLOOD PRESSURE: 117 MMHG

## 2017-12-17 VITALS — DIASTOLIC BLOOD PRESSURE: 78 MMHG | SYSTOLIC BLOOD PRESSURE: 137 MMHG

## 2017-12-17 VITALS — SYSTOLIC BLOOD PRESSURE: 138 MMHG | DIASTOLIC BLOOD PRESSURE: 98 MMHG

## 2017-12-17 RX ADMIN — DEXTROSE MONOHYDRATE SCH MLS/HR: 50 INJECTION, SOLUTION INTRAVENOUS at 15:34

## 2017-12-17 RX ADMIN — Medication PRN OZ: at 08:51

## 2017-12-17 RX ADMIN — Medication SCH EACH: at 12:11

## 2017-12-17 RX ADMIN — INSULIN HUMAN PRN UNIT: 100 INJECTION, SOLUTION PARENTERAL at 08:56

## 2017-12-17 RX ADMIN — Medication SCH EACH: at 08:15

## 2017-12-17 RX ADMIN — INSULIN HUMAN PRN UNIT: 100 INJECTION, SOLUTION PARENTERAL at 22:26

## 2017-12-17 RX ADMIN — MUPIROCIN SCH APPLIC: 20 OINTMENT TOPICAL at 08:57

## 2017-12-17 RX ADMIN — ATORVASTATIN CALCIUM SCH MG: 10 TABLET, FILM COATED ORAL at 08:40

## 2017-12-17 RX ADMIN — AMLODIPINE BESYLATE SCH MG: 10 TABLET ORAL at 08:40

## 2017-12-17 RX ADMIN — INSULIN HUMAN PRN UNIT: 100 INJECTION, SOLUTION PARENTERAL at 12:36

## 2017-12-17 RX ADMIN — DIVALPROEX SODIUM SCH MG: 125 CAPSULE ORAL at 15:07

## 2017-12-17 RX ADMIN — MUPIROCIN SCH APPLIC: 20 OINTMENT TOPICAL at 20:59

## 2017-12-17 RX ADMIN — METOPROLOL SUCCINATE SCH MG: 50 TABLET, EXTENDED RELEASE ORAL at 09:00

## 2017-12-17 RX ADMIN — METFORMIN HYDROCHLORIDE SCH MG: 500 TABLET, FILM COATED ORAL at 08:40

## 2017-12-17 RX ADMIN — METFORMIN HYDROCHLORIDE SCH MG: 500 TABLET, FILM COATED ORAL at 16:57

## 2017-12-17 RX ADMIN — LOSARTAN POTASSIUM SCH MG: 50 TABLET, FILM COATED ORAL at 08:40

## 2017-12-17 RX ADMIN — INSULIN HUMAN PRN UNIT: 100 INJECTION, SOLUTION PARENTERAL at 17:06

## 2017-12-17 RX ADMIN — INSULIN DETEMIR SCH UNIT: 100 INJECTION, SOLUTION SUBCUTANEOUS at 22:25

## 2017-12-17 RX ADMIN — SODIUM CHLORIDE PRN MLS/HR: 9 INJECTION, SOLUTION INTRAVENOUS at 20:56

## 2017-12-17 RX ADMIN — Medication SCH EACH: at 22:29

## 2017-12-17 RX ADMIN — ASPIRIN 81 MG SCH MG: 81 TABLET ORAL at 08:40

## 2017-12-17 RX ADMIN — Medication PRN OZ: at 17:06

## 2017-12-17 RX ADMIN — Medication SCH EACH: at 16:57

## 2017-12-17 RX ADMIN — SODIUM CHLORIDE PRN MLS/HR: 9 INJECTION, SOLUTION INTRAVENOUS at 08:44

## 2017-12-17 RX ADMIN — ENOXAPARIN SODIUM SCH MG: 30 INJECTION SUBCUTANEOUS at 08:44

## 2017-12-17 RX ADMIN — DIVALPROEX SODIUM SCH MG: 125 CAPSULE ORAL at 21:01

## 2017-12-18 VITALS — SYSTOLIC BLOOD PRESSURE: 144 MMHG | DIASTOLIC BLOOD PRESSURE: 57 MMHG

## 2017-12-18 VITALS — DIASTOLIC BLOOD PRESSURE: 71 MMHG | SYSTOLIC BLOOD PRESSURE: 158 MMHG

## 2017-12-18 VITALS — DIASTOLIC BLOOD PRESSURE: 67 MMHG | SYSTOLIC BLOOD PRESSURE: 154 MMHG

## 2017-12-18 RX ADMIN — ATORVASTATIN CALCIUM SCH MG: 10 TABLET, FILM COATED ORAL at 08:37

## 2017-12-18 RX ADMIN — DIVALPROEX SODIUM SCH MG: 125 CAPSULE ORAL at 08:37

## 2017-12-18 RX ADMIN — INSULIN HUMAN PRN UNIT: 100 INJECTION, SOLUTION PARENTERAL at 12:00

## 2017-12-18 RX ADMIN — INSULIN HUMAN PRN UNIT: 100 INJECTION, SOLUTION PARENTERAL at 16:57

## 2017-12-18 RX ADMIN — DIVALPROEX SODIUM SCH MG: 125 CAPSULE ORAL at 16:38

## 2017-12-18 RX ADMIN — INSULIN HUMAN PRN UNIT: 100 INJECTION, SOLUTION PARENTERAL at 08:52

## 2017-12-18 RX ADMIN — ASPIRIN 81 MG SCH MG: 81 TABLET ORAL at 08:37

## 2017-12-18 RX ADMIN — Medication SCH EACH: at 16:39

## 2017-12-18 RX ADMIN — MUPIROCIN SCH APPLIC: 20 OINTMENT TOPICAL at 08:38

## 2017-12-18 RX ADMIN — ENOXAPARIN SODIUM SCH MG: 30 INJECTION SUBCUTANEOUS at 08:46

## 2017-12-18 RX ADMIN — METFORMIN HYDROCHLORIDE SCH MG: 500 TABLET, FILM COATED ORAL at 08:37

## 2017-12-18 RX ADMIN — Medication PRN OZ: at 08:48

## 2017-12-18 RX ADMIN — INSULIN HUMAN PRN UNIT: 100 INJECTION, SOLUTION PARENTERAL at 21:35

## 2017-12-18 RX ADMIN — METOPROLOL SUCCINATE SCH MG: 50 TABLET, EXTENDED RELEASE ORAL at 08:47

## 2017-12-18 RX ADMIN — Medication SCH EACH: at 22:16

## 2017-12-18 RX ADMIN — Medication SCH EACH: at 11:55

## 2017-12-18 RX ADMIN — METFORMIN HYDROCHLORIDE SCH MG: 500 TABLET, FILM COATED ORAL at 16:38

## 2017-12-18 RX ADMIN — AMLODIPINE BESYLATE SCH MG: 10 TABLET ORAL at 08:37

## 2017-12-18 RX ADMIN — INSULIN DETEMIR SCH UNIT: 100 INJECTION, SOLUTION SUBCUTANEOUS at 21:35

## 2017-12-18 RX ADMIN — Medication SCH EACH: at 08:36

## 2017-12-18 RX ADMIN — DIVALPROEX SODIUM SCH MG: 125 CAPSULE ORAL at 14:13

## 2017-12-18 RX ADMIN — LOSARTAN POTASSIUM SCH MG: 50 TABLET, FILM COATED ORAL at 08:37

## 2017-12-18 RX ADMIN — MUPIROCIN SCH APPLIC: 20 OINTMENT TOPICAL at 21:00

## 2017-12-19 VITALS — DIASTOLIC BLOOD PRESSURE: 65 MMHG | SYSTOLIC BLOOD PRESSURE: 126 MMHG

## 2017-12-19 VITALS — SYSTOLIC BLOOD PRESSURE: 140 MMHG | DIASTOLIC BLOOD PRESSURE: 69 MMHG

## 2017-12-19 RX ADMIN — DIVALPROEX SODIUM SCH MG: 125 CAPSULE ORAL at 09:00

## 2017-12-19 RX ADMIN — ASPIRIN 81 MG SCH MG: 81 TABLET ORAL at 09:00

## 2017-12-19 RX ADMIN — MUPIROCIN SCH APPLIC: 20 OINTMENT TOPICAL at 09:01

## 2017-12-19 RX ADMIN — ATORVASTATIN CALCIUM SCH MG: 10 TABLET, FILM COATED ORAL at 09:00

## 2017-12-19 RX ADMIN — ZOLPIDEM TARTRATE PRN MG: 5 TABLET, FILM COATED ORAL at 00:26

## 2017-12-19 RX ADMIN — Medication SCH EACH: at 07:19

## 2017-12-19 RX ADMIN — ENOXAPARIN SODIUM SCH MG: 30 INJECTION SUBCUTANEOUS at 09:08

## 2017-12-19 RX ADMIN — ASPIRIN 81 MG SCH MG: 81 TABLET ORAL at 09:04

## 2017-12-19 RX ADMIN — METFORMIN HYDROCHLORIDE SCH MG: 500 TABLET, FILM COATED ORAL at 09:00

## 2017-12-19 RX ADMIN — METOPROLOL SUCCINATE SCH MG: 50 TABLET, EXTENDED RELEASE ORAL at 09:05

## 2017-12-19 RX ADMIN — LOSARTAN POTASSIUM SCH MG: 50 TABLET, FILM COATED ORAL at 09:05

## 2017-12-19 RX ADMIN — AMLODIPINE BESYLATE SCH MG: 10 TABLET ORAL at 09:00

## 2017-12-19 RX ADMIN — DIVALPROEX SODIUM SCH MG: 125 CAPSULE ORAL at 09:05

## 2017-12-19 RX ADMIN — METFORMIN HYDROCHLORIDE SCH MG: 500 TABLET, FILM COATED ORAL at 09:05

## 2017-12-19 RX ADMIN — LOSARTAN POTASSIUM SCH MG: 50 TABLET, FILM COATED ORAL at 09:00

## 2017-12-19 RX ADMIN — AMLODIPINE BESYLATE SCH MG: 10 TABLET ORAL at 09:05

## 2017-12-19 RX ADMIN — Medication PRN OZ: at 09:02

## 2017-12-19 RX ADMIN — ATORVASTATIN CALCIUM SCH MG: 10 TABLET, FILM COATED ORAL at 09:04

## 2017-12-19 RX ADMIN — METOPROLOL SUCCINATE SCH MG: 50 TABLET, EXTENDED RELEASE ORAL at 09:00

## 2018-03-19 ENCOUNTER — HOSPITAL ENCOUNTER (INPATIENT)
Dept: HOSPITAL 54 - ER | Age: 83
LOS: 7 days | Discharge: HOME | DRG: 871 | End: 2018-03-26
Attending: INTERNAL MEDICINE | Admitting: INTERNAL MEDICINE
Payer: MEDICARE

## 2018-03-19 VITALS — SYSTOLIC BLOOD PRESSURE: 121 MMHG | DIASTOLIC BLOOD PRESSURE: 84 MMHG

## 2018-03-19 VITALS — SYSTOLIC BLOOD PRESSURE: 140 MMHG | DIASTOLIC BLOOD PRESSURE: 65 MMHG

## 2018-03-19 VITALS — WEIGHT: 134.25 LBS | BODY MASS INDEX: 26.35 KG/M2 | HEIGHT: 60 IN

## 2018-03-19 DIAGNOSIS — Z79.84: ICD-10-CM

## 2018-03-19 DIAGNOSIS — G92: ICD-10-CM

## 2018-03-19 DIAGNOSIS — R65.20: ICD-10-CM

## 2018-03-19 DIAGNOSIS — Z87.440: ICD-10-CM

## 2018-03-19 DIAGNOSIS — E11.65: ICD-10-CM

## 2018-03-19 DIAGNOSIS — F05: ICD-10-CM

## 2018-03-19 DIAGNOSIS — G30.9: ICD-10-CM

## 2018-03-19 DIAGNOSIS — K21.9: ICD-10-CM

## 2018-03-19 DIAGNOSIS — E87.6: ICD-10-CM

## 2018-03-19 DIAGNOSIS — A41.9: Primary | ICD-10-CM

## 2018-03-19 DIAGNOSIS — Z22.322: ICD-10-CM

## 2018-03-19 DIAGNOSIS — N17.0: ICD-10-CM

## 2018-03-19 DIAGNOSIS — B96.20: ICD-10-CM

## 2018-03-19 DIAGNOSIS — R53.1: ICD-10-CM

## 2018-03-19 DIAGNOSIS — L89.312: ICD-10-CM

## 2018-03-19 DIAGNOSIS — L98.8: ICD-10-CM

## 2018-03-19 DIAGNOSIS — N39.0: ICD-10-CM

## 2018-03-19 DIAGNOSIS — E78.5: ICD-10-CM

## 2018-03-19 DIAGNOSIS — F02.80: ICD-10-CM

## 2018-03-19 DIAGNOSIS — L89.322: ICD-10-CM

## 2018-03-19 DIAGNOSIS — Z16.12: ICD-10-CM

## 2018-03-19 DIAGNOSIS — F29: ICD-10-CM

## 2018-03-19 LAB
ALBUMIN SERPL BCP-MCNC: 2.9 G/DL (ref 3.4–5)
ALP SERPL-CCNC: 64 U/L (ref 46–116)
ALT SERPL W P-5'-P-CCNC: 14 U/L (ref 12–78)
APTT PPP: 20 SEC (ref 23–34)
AST SERPL W P-5'-P-CCNC: 14 U/L (ref 15–37)
BASOPHILS # BLD AUTO: 0.1 /CMM (ref 0–0.2)
BASOPHILS NFR BLD AUTO: 0.9 % (ref 0–2)
BILIRUB DIRECT SERPL-MCNC: 0.2 MG/DL (ref 0–0.2)
BILIRUB SERPL-MCNC: 0.8 MG/DL (ref 0.2–1)
BUN SERPL-MCNC: 36 MG/DL (ref 7–18)
CALCIUM SERPL-MCNC: 8.4 MG/DL (ref 8.5–10.1)
CHLORIDE SERPL-SCNC: 103 MMOL/L (ref 98–107)
CO2 SERPL-SCNC: 24 MMOL/L (ref 21–32)
CREAT SERPL-MCNC: 1.4 MG/DL (ref 0.6–1.3)
EOSINOPHIL # BLD AUTO: 0.1 /CMM (ref 0–0.7)
EOSINOPHIL NFR BLD AUTO: 1.5 % (ref 0–6)
GLUCOSE SERPL-MCNC: 288 MG/DL (ref 74–106)
HCT VFR BLD AUTO: 35 % (ref 33–45)
HGB BLD-MCNC: 11.9 G/DL (ref 11.5–14.8)
INR PPP: 1 (ref 0.85–1.15)
LYMPHOCYTES NFR BLD AUTO: 1.7 /CMM (ref 0.8–4.8)
LYMPHOCYTES NFR BLD AUTO: 24 % (ref 20–44)
MCH RBC QN AUTO: 29 PG (ref 26–33)
MCHC RBC AUTO-ENTMCNC: 34 G/DL (ref 31–36)
MCV RBC AUTO: 86 FL (ref 82–100)
MONOCYTES NFR BLD AUTO: 0.5 /CMM (ref 0.1–1.3)
MONOCYTES NFR BLD AUTO: 6.6 % (ref 2–12)
NEUTROPHILS # BLD AUTO: 4.5 /CMM (ref 1.8–8.9)
NEUTROPHILS NFR BLD AUTO: 67 % (ref 43–81)
PH UR STRIP: 5.5 [PH] (ref 5–8)
PLATELET # BLD AUTO: 254 /CMM (ref 150–450)
POTASSIUM SERPL-SCNC: 4.1 MMOL/L (ref 3.5–5.1)
PROT SERPL-MCNC: 7.7 G/DL (ref 6.4–8.2)
PROT UR QL STRIP: >=300 MG/DL
RBC # BLD AUTO: 4.1 MIL/UL (ref 4–5.2)
RBC #/AREA URNS HPF: (no result) /HPF (ref 0–2)
RDW COEFFICIENT OF VARIATION: 13.1 (ref 11.5–15)
SODIUM SERPL-SCNC: 134 MMOL/L (ref 136–145)
TROPONIN I SERPL-MCNC: < 0.017 NG/ML (ref 0–0.06)
UROBILINOGEN UR STRIP-MCNC: 0.2 EU/DL
WBC #/AREA URNS HPF: (no result) /HPF (ref 0–3)
WBC NRBC COR # BLD AUTO: 6.9 K/UL (ref 4.3–11)

## 2018-03-19 PROCEDURE — Z7610: HCPCS

## 2018-03-19 PROCEDURE — A6403 STERILE GAUZE>16 <= 48 SQ IN: HCPCS

## 2018-03-19 PROCEDURE — A4216 STERILE WATER/SALINE, 10 ML: HCPCS

## 2018-03-19 PROCEDURE — A6248 HYDROGEL DRSG GEL FILLER: HCPCS

## 2018-03-19 PROCEDURE — A4606 OXYGEN PROBE USED W OXIMETER: HCPCS

## 2018-03-19 PROCEDURE — A6402 STERILE GAUZE <= 16 SQ IN: HCPCS

## 2018-03-19 RX ADMIN — INSULIN HUMAN PRN UNIT: 100 INJECTION, SOLUTION PARENTERAL at 18:47

## 2018-03-19 RX ADMIN — Medication SCH EACH: at 21:35

## 2018-03-19 RX ADMIN — HUMAN INSULIN PRN UNIT: 100 INJECTION, SOLUTION SUBCUTANEOUS at 22:19

## 2018-03-19 RX ADMIN — SODIUM CHLORIDE PRN MLS/HR: 9 INJECTION, SOLUTION INTRAVENOUS at 17:30

## 2018-03-19 RX ADMIN — Medication PRN EACH: at 21:31

## 2018-03-19 RX ADMIN — ZOLPIDEM TARTRATE PRN MG: 5 TABLET, FILM COATED ORAL at 21:31

## 2018-03-19 RX ADMIN — Medication SCH EACH: at 17:30

## 2018-03-20 VITALS — SYSTOLIC BLOOD PRESSURE: 110 MMHG | DIASTOLIC BLOOD PRESSURE: 60 MMHG

## 2018-03-20 VITALS — SYSTOLIC BLOOD PRESSURE: 131 MMHG | DIASTOLIC BLOOD PRESSURE: 70 MMHG

## 2018-03-20 VITALS — DIASTOLIC BLOOD PRESSURE: 57 MMHG | SYSTOLIC BLOOD PRESSURE: 141 MMHG

## 2018-03-20 VITALS — DIASTOLIC BLOOD PRESSURE: 76 MMHG | SYSTOLIC BLOOD PRESSURE: 112 MMHG

## 2018-03-20 LAB
BASOPHILS # BLD AUTO: 0 /CMM (ref 0–0.2)
BASOPHILS NFR BLD AUTO: 0.5 % (ref 0–2)
BUN SERPL-MCNC: 24 MG/DL (ref 7–18)
CALCIUM SERPL-MCNC: 8.2 MG/DL (ref 8.5–10.1)
CHLORIDE SERPL-SCNC: 107 MMOL/L (ref 98–107)
CO2 SERPL-SCNC: 24 MMOL/L (ref 21–32)
CREAT SERPL-MCNC: 1 MG/DL (ref 0.6–1.3)
EOSINOPHIL # BLD AUTO: 0.1 /CMM (ref 0–0.7)
EOSINOPHIL NFR BLD AUTO: 2.8 % (ref 0–6)
GLUCOSE SERPL-MCNC: 138 MG/DL (ref 74–106)
HCT VFR BLD AUTO: 34 % (ref 33–45)
HGB BLD-MCNC: 11.3 G/DL (ref 11.5–14.8)
LYMPHOCYTES NFR BLD AUTO: 1.4 /CMM (ref 0.8–4.8)
LYMPHOCYTES NFR BLD AUTO: 27.9 % (ref 20–44)
MAGNESIUM SERPL-MCNC: 1.5 MG/DL (ref 1.8–2.4)
MCH RBC QN AUTO: 29 PG (ref 26–33)
MCHC RBC AUTO-ENTMCNC: 34 G/DL (ref 31–36)
MCV RBC AUTO: 87 FL (ref 82–100)
MONOCYTES NFR BLD AUTO: 0.5 /CMM (ref 0.1–1.3)
MONOCYTES NFR BLD AUTO: 9.4 % (ref 2–12)
NEUTROPHILS # BLD AUTO: 2.9 /CMM (ref 1.8–8.9)
NEUTROPHILS NFR BLD AUTO: 59.4 % (ref 43–81)
PHOSPHATE SERPL-MCNC: 3.1 MG/DL (ref 2.5–4.9)
PLATELET # BLD AUTO: 232 /CMM (ref 150–450)
POTASSIUM SERPL-SCNC: 3.8 MMOL/L (ref 3.5–5.1)
RBC # BLD AUTO: 3.87 MIL/UL (ref 4–5.2)
RDW COEFFICIENT OF VARIATION: 14.2 (ref 11.5–15)
SODIUM SERPL-SCNC: 140 MMOL/L (ref 136–145)
WBC NRBC COR # BLD AUTO: 4.9 K/UL (ref 4.3–11)

## 2018-03-20 RX ADMIN — SODIUM CHLORIDE PRN MLS/HR: 9 INJECTION, SOLUTION INTRAVENOUS at 06:34

## 2018-03-20 RX ADMIN — MAGNESIUM SULFATE IN DEXTROSE SCH MLS/HR: 10 INJECTION, SOLUTION INTRAVENOUS at 11:25

## 2018-03-20 RX ADMIN — Medication SCH EACH: at 17:21

## 2018-03-20 RX ADMIN — Medication SCH EACH: at 21:41

## 2018-03-20 RX ADMIN — Medication SCH ML: at 17:07

## 2018-03-20 RX ADMIN — Medication PRN EACH: at 23:43

## 2018-03-20 RX ADMIN — Medication SCH EACH: at 08:00

## 2018-03-20 RX ADMIN — Medication PRN EACH: at 15:26

## 2018-03-20 RX ADMIN — Medication PRN EACH: at 19:38

## 2018-03-20 RX ADMIN — Medication SCH EACH: at 11:32

## 2018-03-20 RX ADMIN — Medication SCH APPLIC: at 09:00

## 2018-03-20 RX ADMIN — ZOLPIDEM TARTRATE PRN MG: 5 TABLET, FILM COATED ORAL at 23:43

## 2018-03-20 RX ADMIN — HUMAN INSULIN PRN UNIT: 100 INJECTION, SOLUTION SUBCUTANEOUS at 21:37

## 2018-03-20 RX ADMIN — INSULIN HUMAN PRN UNIT: 100 INJECTION, SOLUTION PARENTERAL at 17:00

## 2018-03-20 RX ADMIN — INSULIN HUMAN PRN UNIT: 100 INJECTION, SOLUTION PARENTERAL at 07:59

## 2018-03-20 RX ADMIN — MAGNESIUM SULFATE IN DEXTROSE SCH MLS/HR: 10 INJECTION, SOLUTION INTRAVENOUS at 10:00

## 2018-03-21 VITALS — DIASTOLIC BLOOD PRESSURE: 69 MMHG | SYSTOLIC BLOOD PRESSURE: 143 MMHG

## 2018-03-21 VITALS — SYSTOLIC BLOOD PRESSURE: 111 MMHG | DIASTOLIC BLOOD PRESSURE: 65 MMHG

## 2018-03-21 VITALS — SYSTOLIC BLOOD PRESSURE: 165 MMHG | DIASTOLIC BLOOD PRESSURE: 41 MMHG

## 2018-03-21 VITALS — DIASTOLIC BLOOD PRESSURE: 67 MMHG | SYSTOLIC BLOOD PRESSURE: 155 MMHG

## 2018-03-21 LAB
BASOPHILS # BLD AUTO: 0 /CMM (ref 0–0.2)
BASOPHILS NFR BLD AUTO: 0.6 % (ref 0–2)
BUN SERPL-MCNC: 22 MG/DL (ref 7–18)
CALCIUM SERPL-MCNC: 8.2 MG/DL (ref 8.5–10.1)
CHLORIDE SERPL-SCNC: 104 MMOL/L (ref 98–107)
CO2 SERPL-SCNC: 18 MMOL/L (ref 21–32)
CREAT SERPL-MCNC: 1.1 MG/DL (ref 0.6–1.3)
EOSINOPHIL # BLD AUTO: 0 /CMM (ref 0–0.7)
EOSINOPHIL NFR BLD AUTO: 0.9 % (ref 0–6)
GLUCOSE SERPL-MCNC: 295 MG/DL (ref 74–106)
HCT VFR BLD AUTO: 33 % (ref 33–45)
HGB BLD-MCNC: 11.1 G/DL (ref 11.5–14.8)
LYMPHOCYTES NFR BLD AUTO: 1.1 /CMM (ref 0.8–4.8)
LYMPHOCYTES NFR BLD AUTO: 23.8 % (ref 20–44)
MAGNESIUM SERPL-MCNC: 1.8 MG/DL (ref 1.8–2.4)
MCH RBC QN AUTO: 29 PG (ref 26–33)
MCHC RBC AUTO-ENTMCNC: 34 G/DL (ref 31–36)
MCV RBC AUTO: 85 FL (ref 82–100)
MONOCYTES NFR BLD AUTO: 0.6 /CMM (ref 0.1–1.3)
MONOCYTES NFR BLD AUTO: 11.8 % (ref 2–12)
NEUTROPHILS # BLD AUTO: 3 /CMM (ref 1.8–8.9)
NEUTROPHILS NFR BLD AUTO: 62.9 % (ref 43–81)
PLATELET # BLD AUTO: 269 /CMM (ref 150–450)
POTASSIUM SERPL-SCNC: 4.1 MMOL/L (ref 3.5–5.1)
RBC # BLD AUTO: 3.83 MIL/UL (ref 4–5.2)
RDW COEFFICIENT OF VARIATION: 14.4 (ref 11.5–15)
SODIUM SERPL-SCNC: 138 MMOL/L (ref 136–145)
WBC NRBC COR # BLD AUTO: 4.7 K/UL (ref 4.3–11)

## 2018-03-21 RX ADMIN — Medication SCH EACH: at 21:31

## 2018-03-21 RX ADMIN — SODIUM CHLORIDE PRN MLS/HR: 9 INJECTION, SOLUTION INTRAVENOUS at 02:33

## 2018-03-21 RX ADMIN — Medication SCH EACH: at 16:53

## 2018-03-21 RX ADMIN — Medication SCH APPLIC: at 08:24

## 2018-03-21 RX ADMIN — INSULIN HUMAN PRN UNIT: 100 INJECTION, SOLUTION PARENTERAL at 11:40

## 2018-03-21 RX ADMIN — Medication SCH ML: at 16:53

## 2018-03-21 RX ADMIN — Medication SCH EACH: at 08:23

## 2018-03-21 RX ADMIN — HUMAN INSULIN PRN UNIT: 100 INJECTION, SOLUTION SUBCUTANEOUS at 21:39

## 2018-03-21 RX ADMIN — OXYCODONE HYDROCHLORIDE AND ACETAMINOPHEN SCH MG: 500 TABLET ORAL at 08:22

## 2018-03-21 RX ADMIN — THERA TABS SCH UDTAB: TAB at 08:22

## 2018-03-21 RX ADMIN — MEROPENEM SCH MLS/HR: 500 INJECTION INTRAVENOUS at 15:44

## 2018-03-21 RX ADMIN — MUPIROCIN SCH APPLIC: 20 OINTMENT TOPICAL at 21:05

## 2018-03-21 RX ADMIN — Medication SCH ML: at 08:00

## 2018-03-21 RX ADMIN — Medication SCH EACH: at 11:40

## 2018-03-21 RX ADMIN — INSULIN ASPART SCH UNIT: 100 INJECTION, SOLUTION INTRAVENOUS; SUBCUTANEOUS at 21:39

## 2018-03-21 RX ADMIN — ACETAMINOPHEN PRN MG: 325 TABLET ORAL at 21:06

## 2018-03-21 RX ADMIN — INSULIN HUMAN PRN UNIT: 100 INJECTION, SOLUTION PARENTERAL at 08:24

## 2018-03-21 RX ADMIN — INSULIN ASPART SCH UNIT: 100 INJECTION, SOLUTION INTRAVENOUS; SUBCUTANEOUS at 16:54

## 2018-03-21 RX ADMIN — ACETAMINOPHEN PRN MG: 325 TABLET ORAL at 04:20

## 2018-03-22 VITALS — DIASTOLIC BLOOD PRESSURE: 67 MMHG | SYSTOLIC BLOOD PRESSURE: 158 MMHG

## 2018-03-22 VITALS — DIASTOLIC BLOOD PRESSURE: 76 MMHG | SYSTOLIC BLOOD PRESSURE: 149 MMHG

## 2018-03-22 VITALS — DIASTOLIC BLOOD PRESSURE: 73 MMHG | SYSTOLIC BLOOD PRESSURE: 143 MMHG

## 2018-03-22 VITALS — DIASTOLIC BLOOD PRESSURE: 69 MMHG | SYSTOLIC BLOOD PRESSURE: 150 MMHG

## 2018-03-22 LAB
BASOPHILS # BLD AUTO: 0 /CMM (ref 0–0.2)
BASOPHILS NFR BLD AUTO: 0.5 % (ref 0–2)
BUN SERPL-MCNC: 16 MG/DL (ref 7–18)
CALCIUM SERPL-MCNC: 8.5 MG/DL (ref 8.5–10.1)
CHLORIDE SERPL-SCNC: 105 MMOL/L (ref 98–107)
CO2 SERPL-SCNC: 18 MMOL/L (ref 21–32)
CREAT SERPL-MCNC: 1 MG/DL (ref 0.6–1.3)
EOSINOPHIL # BLD AUTO: 0.1 /CMM (ref 0–0.7)
EOSINOPHIL NFR BLD AUTO: 1.2 % (ref 0–6)
GLUCOSE SERPL-MCNC: 278 MG/DL (ref 74–106)
HCT VFR BLD AUTO: 37 % (ref 33–45)
HGB BLD-MCNC: 12.3 G/DL (ref 11.5–14.8)
LYMPHOCYTES NFR BLD AUTO: 1 /CMM (ref 0.8–4.8)
LYMPHOCYTES NFR BLD AUTO: 16.2 % (ref 20–44)
MAGNESIUM SERPL-MCNC: 1.6 MG/DL (ref 1.8–2.4)
MCH RBC QN AUTO: 29 PG (ref 26–33)
MCHC RBC AUTO-ENTMCNC: 33 G/DL (ref 31–36)
MCV RBC AUTO: 87 FL (ref 82–100)
MONOCYTES NFR BLD AUTO: 0.5 /CMM (ref 0.1–1.3)
MONOCYTES NFR BLD AUTO: 8 % (ref 2–12)
NEUTROPHILS # BLD AUTO: 4.8 /CMM (ref 1.8–8.9)
NEUTROPHILS NFR BLD AUTO: 74.1 % (ref 43–81)
PHOSPHATE SERPL-MCNC: 3 MG/DL (ref 2.5–4.9)
PLATELET # BLD AUTO: 260 /CMM (ref 150–450)
POTASSIUM SERPL-SCNC: 3.8 MMOL/L (ref 3.5–5.1)
RBC # BLD AUTO: 4.27 MIL/UL (ref 4–5.2)
RDW COEFFICIENT OF VARIATION: 14.4 (ref 11.5–15)
SODIUM SERPL-SCNC: 140 MMOL/L (ref 136–145)
WBC NRBC COR # BLD AUTO: 6.4 K/UL (ref 4.3–11)

## 2018-03-22 RX ADMIN — Medication SCH ML: at 17:05

## 2018-03-22 RX ADMIN — MAGNESIUM SULFATE IN DEXTROSE SCH MLS/HR: 10 INJECTION, SOLUTION INTRAVENOUS at 10:41

## 2018-03-22 RX ADMIN — MEROPENEM SCH MLS/HR: 500 INJECTION INTRAVENOUS at 02:15

## 2018-03-22 RX ADMIN — Medication SCH ML: at 08:19

## 2018-03-22 RX ADMIN — Medication PRN EACH: at 12:01

## 2018-03-22 RX ADMIN — SODIUM CHLORIDE PRN MLS/HR: 9 INJECTION, SOLUTION INTRAVENOUS at 05:02

## 2018-03-22 RX ADMIN — MUPIROCIN SCH APPLIC: 20 OINTMENT TOPICAL at 08:19

## 2018-03-22 RX ADMIN — INSULIN ASPART SCH UNIT: 100 INJECTION, SOLUTION INTRAVENOUS; SUBCUTANEOUS at 07:48

## 2018-03-22 RX ADMIN — Medication SCH EACH: at 21:51

## 2018-03-22 RX ADMIN — INSULIN ASPART SCH UNIT: 100 INJECTION, SOLUTION INTRAVENOUS; SUBCUTANEOUS at 21:58

## 2018-03-22 RX ADMIN — INSULIN ASPART SCH UNIT: 100 INJECTION, SOLUTION INTRAVENOUS; SUBCUTANEOUS at 12:03

## 2018-03-22 RX ADMIN — OXYCODONE HYDROCHLORIDE AND ACETAMINOPHEN SCH MG: 500 TABLET ORAL at 08:19

## 2018-03-22 RX ADMIN — INSULIN HUMAN PRN UNIT: 100 INJECTION, SOLUTION PARENTERAL at 12:04

## 2018-03-22 RX ADMIN — MEROPENEM SCH MLS/HR: 500 INJECTION INTRAVENOUS at 14:45

## 2018-03-22 RX ADMIN — Medication SCH EACH: at 11:58

## 2018-03-22 RX ADMIN — MAGNESIUM SULFATE IN DEXTROSE SCH MLS/HR: 10 INJECTION, SOLUTION INTRAVENOUS at 11:58

## 2018-03-22 RX ADMIN — Medication SCH EACH: at 07:48

## 2018-03-22 RX ADMIN — Medication SCH APPLIC: at 08:19

## 2018-03-22 RX ADMIN — Medication SCH EACH: at 17:05

## 2018-03-22 RX ADMIN — THERA TABS SCH UDTAB: TAB at 08:19

## 2018-03-22 RX ADMIN — HUMAN INSULIN PRN UNIT: 100 INJECTION, SOLUTION SUBCUTANEOUS at 22:00

## 2018-03-22 RX ADMIN — INSULIN ASPART SCH UNIT: 100 INJECTION, SOLUTION INTRAVENOUS; SUBCUTANEOUS at 17:05

## 2018-03-23 VITALS — SYSTOLIC BLOOD PRESSURE: 165 MMHG | DIASTOLIC BLOOD PRESSURE: 73 MMHG

## 2018-03-23 VITALS — SYSTOLIC BLOOD PRESSURE: 152 MMHG | DIASTOLIC BLOOD PRESSURE: 84 MMHG

## 2018-03-23 VITALS — DIASTOLIC BLOOD PRESSURE: 58 MMHG | SYSTOLIC BLOOD PRESSURE: 153 MMHG

## 2018-03-23 VITALS — DIASTOLIC BLOOD PRESSURE: 73 MMHG | SYSTOLIC BLOOD PRESSURE: 147 MMHG

## 2018-03-23 LAB
BASOPHILS # BLD AUTO: 0 /CMM (ref 0–0.2)
BASOPHILS NFR BLD AUTO: 0.3 % (ref 0–2)
BUN SERPL-MCNC: 19 MG/DL (ref 7–18)
CALCIUM SERPL-MCNC: 8.2 MG/DL (ref 8.5–10.1)
CHLORIDE SERPL-SCNC: 106 MMOL/L (ref 98–107)
CO2 SERPL-SCNC: 25 MMOL/L (ref 21–32)
CREAT SERPL-MCNC: 0.9 MG/DL (ref 0.6–1.3)
EOSINOPHIL # BLD AUTO: 0.2 /CMM (ref 0–0.7)
EOSINOPHIL NFR BLD AUTO: 1.9 % (ref 0–6)
GLUCOSE SERPL-MCNC: 190 MG/DL (ref 74–106)
HCT VFR BLD AUTO: 34 % (ref 33–45)
HGB BLD-MCNC: 11.5 G/DL (ref 11.5–14.8)
LYMPHOCYTES NFR BLD AUTO: 1.8 /CMM (ref 0.8–4.8)
LYMPHOCYTES NFR BLD AUTO: 22 % (ref 20–44)
MAGNESIUM SERPL-MCNC: 2 MG/DL (ref 1.8–2.4)
MCH RBC QN AUTO: 29 PG (ref 26–33)
MCHC RBC AUTO-ENTMCNC: 34 G/DL (ref 31–36)
MCV RBC AUTO: 86 FL (ref 82–100)
MONOCYTES NFR BLD AUTO: 0.8 /CMM (ref 0.1–1.3)
MONOCYTES NFR BLD AUTO: 9.1 % (ref 2–12)
NEUTROPHILS # BLD AUTO: 5.6 /CMM (ref 1.8–8.9)
NEUTROPHILS NFR BLD AUTO: 66.7 % (ref 43–81)
PLATELET # BLD AUTO: 293 /CMM (ref 150–450)
POTASSIUM SERPL-SCNC: 3.6 MMOL/L (ref 3.5–5.1)
RBC # BLD AUTO: 3.95 MIL/UL (ref 4–5.2)
RDW COEFFICIENT OF VARIATION: 14.1 (ref 11.5–15)
SODIUM SERPL-SCNC: 141 MMOL/L (ref 136–145)
WBC NRBC COR # BLD AUTO: 8.4 K/UL (ref 4.3–11)

## 2018-03-23 RX ADMIN — Medication SCH EACH: at 21:50

## 2018-03-23 RX ADMIN — INSULIN ASPART SCH UNIT: 100 INJECTION, SOLUTION INTRAVENOUS; SUBCUTANEOUS at 17:07

## 2018-03-23 RX ADMIN — Medication SCH ML: at 07:42

## 2018-03-23 RX ADMIN — HUMAN INSULIN PRN UNIT: 100 INJECTION, SOLUTION SUBCUTANEOUS at 21:56

## 2018-03-23 RX ADMIN — MUPIROCIN SCH APPLIC: 20 OINTMENT TOPICAL at 20:49

## 2018-03-23 RX ADMIN — OXYCODONE HYDROCHLORIDE AND ACETAMINOPHEN SCH MG: 500 TABLET ORAL at 09:05

## 2018-03-23 RX ADMIN — MUPIROCIN SCH APPLIC: 20 OINTMENT TOPICAL at 01:10

## 2018-03-23 RX ADMIN — MEROPENEM SCH MLS/HR: 500 INJECTION INTRAVENOUS at 15:28

## 2018-03-23 RX ADMIN — Medication SCH EACH: at 07:42

## 2018-03-23 RX ADMIN — Medication SCH EACH: at 16:58

## 2018-03-23 RX ADMIN — SODIUM CHLORIDE PRN MLS/HR: 9 INJECTION, SOLUTION INTRAVENOUS at 13:04

## 2018-03-23 RX ADMIN — INSULIN ASPART SCH UNIT: 100 INJECTION, SOLUTION INTRAVENOUS; SUBCUTANEOUS at 12:00

## 2018-03-23 RX ADMIN — MEROPENEM SCH MLS/HR: 500 INJECTION INTRAVENOUS at 02:06

## 2018-03-23 RX ADMIN — MUPIROCIN SCH APPLIC: 20 OINTMENT TOPICAL at 09:07

## 2018-03-23 RX ADMIN — INSULIN HUMAN PRN UNIT: 100 INJECTION, SOLUTION PARENTERAL at 12:00

## 2018-03-23 RX ADMIN — DIVALPROEX SODIUM SCH MG: 125 CAPSULE ORAL at 21:48

## 2018-03-23 RX ADMIN — Medication SCH ML: at 16:58

## 2018-03-23 RX ADMIN — THERA TABS SCH UDTAB: TAB at 09:05

## 2018-03-23 RX ADMIN — SODIUM CHLORIDE PRN MLS/HR: 9 INJECTION, SOLUTION INTRAVENOUS at 21:49

## 2018-03-23 RX ADMIN — Medication SCH EACH: at 11:56

## 2018-03-23 RX ADMIN — INSULIN ASPART SCH UNIT: 100 INJECTION, SOLUTION INTRAVENOUS; SUBCUTANEOUS at 07:45

## 2018-03-23 RX ADMIN — Medication SCH APPLIC: at 09:06

## 2018-03-23 RX ADMIN — INSULIN ASPART SCH UNIT: 100 INJECTION, SOLUTION INTRAVENOUS; SUBCUTANEOUS at 21:56

## 2018-03-24 VITALS — SYSTOLIC BLOOD PRESSURE: 145 MMHG | DIASTOLIC BLOOD PRESSURE: 72 MMHG

## 2018-03-24 VITALS — SYSTOLIC BLOOD PRESSURE: 153 MMHG | DIASTOLIC BLOOD PRESSURE: 64 MMHG

## 2018-03-24 VITALS — SYSTOLIC BLOOD PRESSURE: 147 MMHG | DIASTOLIC BLOOD PRESSURE: 83 MMHG

## 2018-03-24 VITALS — SYSTOLIC BLOOD PRESSURE: 157 MMHG | DIASTOLIC BLOOD PRESSURE: 81 MMHG

## 2018-03-24 VITALS — SYSTOLIC BLOOD PRESSURE: 167 MMHG | DIASTOLIC BLOOD PRESSURE: 85 MMHG

## 2018-03-24 LAB
BASOPHILS # BLD AUTO: 0 /CMM (ref 0–0.2)
BASOPHILS NFR BLD AUTO: 0.3 % (ref 0–2)
BUN SERPL-MCNC: 19 MG/DL (ref 7–18)
CALCIUM SERPL-MCNC: 8.2 MG/DL (ref 8.5–10.1)
CHLORIDE SERPL-SCNC: 107 MMOL/L (ref 98–107)
CO2 SERPL-SCNC: 25 MMOL/L (ref 21–32)
CREAT SERPL-MCNC: 0.9 MG/DL (ref 0.6–1.3)
EOSINOPHIL # BLD AUTO: 0.1 /CMM (ref 0–0.7)
EOSINOPHIL NFR BLD AUTO: 1.4 % (ref 0–6)
GLUCOSE SERPL-MCNC: 173 MG/DL (ref 74–106)
HCT VFR BLD AUTO: 33 % (ref 33–45)
HGB BLD-MCNC: 11.3 G/DL (ref 11.5–14.8)
LYMPHOCYTES NFR BLD AUTO: 1.7 /CMM (ref 0.8–4.8)
LYMPHOCYTES NFR BLD AUTO: 16.9 % (ref 20–44)
MCH RBC QN AUTO: 29 PG (ref 26–33)
MCHC RBC AUTO-ENTMCNC: 34 G/DL (ref 31–36)
MCV RBC AUTO: 86 FL (ref 82–100)
MONOCYTES NFR BLD AUTO: 0.9 /CMM (ref 0.1–1.3)
MONOCYTES NFR BLD AUTO: 9.2 % (ref 2–12)
NEUTROPHILS # BLD AUTO: 7.3 /CMM (ref 1.8–8.9)
NEUTROPHILS NFR BLD AUTO: 72.2 % (ref 43–81)
PLATELET # BLD AUTO: 319 /CMM (ref 150–450)
POTASSIUM SERPL-SCNC: 3.3 MMOL/L (ref 3.5–5.1)
RBC # BLD AUTO: 3.86 MIL/UL (ref 4–5.2)
RDW COEFFICIENT OF VARIATION: 14.5 (ref 11.5–15)
SODIUM SERPL-SCNC: 143 MMOL/L (ref 136–145)
WBC NRBC COR # BLD AUTO: 10.1 K/UL (ref 4.3–11)

## 2018-03-24 RX ADMIN — Medication SCH EACH: at 12:27

## 2018-03-24 RX ADMIN — Medication SCH ML: at 08:26

## 2018-03-24 RX ADMIN — INSULIN HUMAN PRN UNIT: 100 INJECTION, SOLUTION PARENTERAL at 12:29

## 2018-03-24 RX ADMIN — Medication SCH ML: at 17:00

## 2018-03-24 RX ADMIN — AMLODIPINE BESYLATE SCH MG: 10 TABLET ORAL at 12:40

## 2018-03-24 RX ADMIN — MEROPENEM SCH MLS/HR: 500 INJECTION INTRAVENOUS at 02:42

## 2018-03-24 RX ADMIN — THERA TABS SCH UDTAB: TAB at 08:26

## 2018-03-24 RX ADMIN — Medication SCH EACH: at 17:59

## 2018-03-24 RX ADMIN — Medication SCH EACH: at 21:41

## 2018-03-24 RX ADMIN — INSULIN HUMAN PRN UNIT: 100 INJECTION, SOLUTION PARENTERAL at 17:59

## 2018-03-24 RX ADMIN — Medication SCH EACH: at 07:19

## 2018-03-24 RX ADMIN — SODIUM CHLORIDE PRN MLS/HR: 9 INJECTION, SOLUTION INTRAVENOUS at 17:57

## 2018-03-24 RX ADMIN — Medication SCH APPLIC: at 08:27

## 2018-03-24 RX ADMIN — INSULIN ASPART SCH UNIT: 100 INJECTION, SOLUTION INTRAVENOUS; SUBCUTANEOUS at 21:44

## 2018-03-24 RX ADMIN — DIVALPROEX SODIUM SCH MG: 125 CAPSULE ORAL at 17:57

## 2018-03-24 RX ADMIN — ASPIRIN 81 MG SCH MG: 81 TABLET ORAL at 12:40

## 2018-03-24 RX ADMIN — INSULIN ASPART SCH UNIT: 100 INJECTION, SOLUTION INTRAVENOUS; SUBCUTANEOUS at 17:59

## 2018-03-24 RX ADMIN — OXYCODONE HYDROCHLORIDE AND ACETAMINOPHEN SCH MG: 500 TABLET ORAL at 08:26

## 2018-03-24 RX ADMIN — MUPIROCIN SCH APPLIC: 20 OINTMENT TOPICAL at 21:42

## 2018-03-24 RX ADMIN — INSULIN ASPART SCH UNIT: 100 INJECTION, SOLUTION INTRAVENOUS; SUBCUTANEOUS at 07:23

## 2018-03-24 RX ADMIN — DIVALPROEX SODIUM SCH MG: 125 CAPSULE ORAL at 08:26

## 2018-03-24 RX ADMIN — ATORVASTATIN CALCIUM SCH MG: 10 TABLET, FILM COATED ORAL at 14:40

## 2018-03-24 RX ADMIN — LOSARTAN POTASSIUM SCH MG: 50 TABLET, FILM COATED ORAL at 14:41

## 2018-03-24 RX ADMIN — MUPIROCIN SCH APPLIC: 20 OINTMENT TOPICAL at 08:26

## 2018-03-24 RX ADMIN — MEROPENEM SCH MLS/HR: 500 INJECTION INTRAVENOUS at 14:43

## 2018-03-24 RX ADMIN — INSULIN ASPART SCH UNIT: 100 INJECTION, SOLUTION INTRAVENOUS; SUBCUTANEOUS at 12:35

## 2018-03-24 RX ADMIN — METOPROLOL SUCCINATE SCH MG: 50 TABLET, EXTENDED RELEASE ORAL at 12:41

## 2018-03-25 VITALS — DIASTOLIC BLOOD PRESSURE: 77 MMHG | SYSTOLIC BLOOD PRESSURE: 129 MMHG

## 2018-03-25 VITALS — DIASTOLIC BLOOD PRESSURE: 72 MMHG | SYSTOLIC BLOOD PRESSURE: 158 MMHG

## 2018-03-25 VITALS — DIASTOLIC BLOOD PRESSURE: 60 MMHG | SYSTOLIC BLOOD PRESSURE: 120 MMHG

## 2018-03-25 VITALS — SYSTOLIC BLOOD PRESSURE: 160 MMHG | DIASTOLIC BLOOD PRESSURE: 95 MMHG

## 2018-03-25 VITALS — DIASTOLIC BLOOD PRESSURE: 70 MMHG | SYSTOLIC BLOOD PRESSURE: 148 MMHG

## 2018-03-25 VITALS — SYSTOLIC BLOOD PRESSURE: 158 MMHG | DIASTOLIC BLOOD PRESSURE: 94 MMHG

## 2018-03-25 LAB
BASOPHILS # BLD AUTO: 0 /CMM (ref 0–0.2)
BASOPHILS NFR BLD AUTO: 0.6 % (ref 0–2)
BUN SERPL-MCNC: 18 MG/DL (ref 7–18)
CALCIUM SERPL-MCNC: 8.2 MG/DL (ref 8.5–10.1)
CHLORIDE SERPL-SCNC: 107 MMOL/L (ref 98–107)
CO2 SERPL-SCNC: 24 MMOL/L (ref 21–32)
CREAT SERPL-MCNC: 0.8 MG/DL (ref 0.6–1.3)
EOSINOPHIL # BLD AUTO: 0.2 /CMM (ref 0–0.7)
EOSINOPHIL NFR BLD AUTO: 2.7 % (ref 0–6)
GLUCOSE SERPL-MCNC: 218 MG/DL (ref 74–106)
HCT VFR BLD AUTO: 32 % (ref 33–45)
HGB BLD-MCNC: 10.8 G/DL (ref 11.5–14.8)
LYMPHOCYTES NFR BLD AUTO: 1.9 /CMM (ref 0.8–4.8)
LYMPHOCYTES NFR BLD AUTO: 23.4 % (ref 20–44)
MAGNESIUM SERPL-MCNC: 1.6 MG/DL (ref 1.8–2.4)
MCH RBC QN AUTO: 29 PG (ref 26–33)
MCHC RBC AUTO-ENTMCNC: 34 G/DL (ref 31–36)
MCV RBC AUTO: 86 FL (ref 82–100)
MONOCYTES NFR BLD AUTO: 0.7 /CMM (ref 0.1–1.3)
MONOCYTES NFR BLD AUTO: 8.6 % (ref 2–12)
NEUTROPHILS # BLD AUTO: 5.3 /CMM (ref 1.8–8.9)
NEUTROPHILS NFR BLD AUTO: 64.7 % (ref 43–81)
PLATELET # BLD AUTO: 269 /CMM (ref 150–450)
POTASSIUM SERPL-SCNC: 3.7 MMOL/L (ref 3.5–5.1)
RBC # BLD AUTO: 3.7 MIL/UL (ref 4–5.2)
RDW COEFFICIENT OF VARIATION: 14 (ref 11.5–15)
SODIUM SERPL-SCNC: 141 MMOL/L (ref 136–145)
WBC NRBC COR # BLD AUTO: 8.1 K/UL (ref 4.3–11)

## 2018-03-25 RX ADMIN — ATORVASTATIN CALCIUM SCH MG: 10 TABLET, FILM COATED ORAL at 08:01

## 2018-03-25 RX ADMIN — INSULIN ASPART SCH UNIT: 100 INJECTION, SOLUTION INTRAVENOUS; SUBCUTANEOUS at 12:14

## 2018-03-25 RX ADMIN — Medication SCH EACH: at 17:50

## 2018-03-25 RX ADMIN — INSULIN HUMAN PRN UNIT: 100 INJECTION, SOLUTION PARENTERAL at 12:21

## 2018-03-25 RX ADMIN — DIVALPROEX SODIUM SCH MG: 125 CAPSULE ORAL at 08:02

## 2018-03-25 RX ADMIN — QUETIAPINE SCH MG: 25 TABLET, FILM COATED ORAL at 17:14

## 2018-03-25 RX ADMIN — MEROPENEM SCH MLS/HR: 500 INJECTION INTRAVENOUS at 03:05

## 2018-03-25 RX ADMIN — INSULIN HUMAN PRN UNIT: 100 INJECTION, SOLUTION PARENTERAL at 06:40

## 2018-03-25 RX ADMIN — Medication SCH APPLIC: at 07:54

## 2018-03-25 RX ADMIN — Medication SCH ML: at 07:52

## 2018-03-25 RX ADMIN — Medication PRN OZ: at 12:21

## 2018-03-25 RX ADMIN — Medication PRN OZ: at 08:03

## 2018-03-25 RX ADMIN — Medication SCH EACH: at 21:03

## 2018-03-25 RX ADMIN — Medication SCH EACH: at 06:42

## 2018-03-25 RX ADMIN — SODIUM CHLORIDE PRN MLS/HR: 9 INJECTION, SOLUTION INTRAVENOUS at 06:38

## 2018-03-25 RX ADMIN — Medication PRN OZ: at 17:50

## 2018-03-25 RX ADMIN — INSULIN HUMAN PRN UNIT: 100 INJECTION, SOLUTION PARENTERAL at 21:07

## 2018-03-25 RX ADMIN — CLOTRIMAZOLE SCH GM: 1 CREAM TOPICAL at 23:32

## 2018-03-25 RX ADMIN — METOPROLOL SUCCINATE SCH MG: 50 TABLET, EXTENDED RELEASE ORAL at 08:02

## 2018-03-25 RX ADMIN — QUETIAPINE PRN MG: 25 TABLET, FILM COATED ORAL at 08:02

## 2018-03-25 RX ADMIN — MAGNESIUM SULFATE IN DEXTROSE SCH MLS/HR: 10 INJECTION, SOLUTION INTRAVENOUS at 12:11

## 2018-03-25 RX ADMIN — Medication SCH ML: at 17:05

## 2018-03-25 RX ADMIN — AMLODIPINE BESYLATE SCH MG: 10 TABLET ORAL at 08:02

## 2018-03-25 RX ADMIN — OXYCODONE HYDROCHLORIDE AND ACETAMINOPHEN SCH MG: 500 TABLET ORAL at 08:02

## 2018-03-25 RX ADMIN — LOSARTAN POTASSIUM SCH MG: 50 TABLET, FILM COATED ORAL at 08:02

## 2018-03-25 RX ADMIN — INSULIN ASPART SCH UNIT: 100 INJECTION, SOLUTION INTRAVENOUS; SUBCUTANEOUS at 17:30

## 2018-03-25 RX ADMIN — MEROPENEM SCH MLS/HR: 500 INJECTION INTRAVENOUS at 14:13

## 2018-03-25 RX ADMIN — DIVALPROEX SODIUM SCH MG: 125 CAPSULE ORAL at 17:05

## 2018-03-25 RX ADMIN — MAGNESIUM SULFATE IN DEXTROSE SCH MLS/HR: 10 INJECTION, SOLUTION INTRAVENOUS at 11:06

## 2018-03-25 RX ADMIN — Medication SCH EACH: at 12:11

## 2018-03-25 RX ADMIN — ASPIRIN 81 MG SCH MG: 81 TABLET ORAL at 08:02

## 2018-03-25 RX ADMIN — MUPIROCIN SCH APPLIC: 20 OINTMENT TOPICAL at 08:09

## 2018-03-25 RX ADMIN — THERA TABS SCH UDTAB: TAB at 08:02

## 2018-03-25 RX ADMIN — INSULIN ASPART SCH UNIT: 100 INJECTION, SOLUTION INTRAVENOUS; SUBCUTANEOUS at 08:01

## 2018-03-25 RX ADMIN — DIVALPROEX SODIUM SCH MG: 125 CAPSULE ORAL at 12:11

## 2018-03-25 RX ADMIN — SODIUM CHLORIDE PRN MLS/HR: 9 INJECTION, SOLUTION INTRAVENOUS at 23:31

## 2018-03-25 RX ADMIN — INSULIN ASPART SCH UNIT: 100 INJECTION, SOLUTION INTRAVENOUS; SUBCUTANEOUS at 21:07

## 2018-03-25 RX ADMIN — MUPIROCIN SCH APPLIC: 20 OINTMENT TOPICAL at 23:33

## 2018-03-26 VITALS — SYSTOLIC BLOOD PRESSURE: 133 MMHG | DIASTOLIC BLOOD PRESSURE: 64 MMHG

## 2018-03-26 LAB
BASOPHILS # BLD AUTO: 0 /CMM (ref 0–0.2)
BASOPHILS NFR BLD AUTO: 0.5 % (ref 0–2)
BUN SERPL-MCNC: 23 MG/DL (ref 7–18)
CALCIUM SERPL-MCNC: 7.9 MG/DL (ref 8.5–10.1)
CHLORIDE SERPL-SCNC: 103 MMOL/L (ref 98–107)
CO2 SERPL-SCNC: 25 MMOL/L (ref 21–32)
CREAT SERPL-MCNC: 0.9 MG/DL (ref 0.6–1.3)
EOSINOPHIL # BLD AUTO: 0.3 /CMM (ref 0–0.7)
EOSINOPHIL NFR BLD AUTO: 3.9 % (ref 0–6)
GLUCOSE SERPL-MCNC: 246 MG/DL (ref 74–106)
HCT VFR BLD AUTO: 34 % (ref 33–45)
HGB BLD-MCNC: 11.4 G/DL (ref 11.5–14.8)
LYMPHOCYTES NFR BLD AUTO: 1.8 /CMM (ref 0.8–4.8)
LYMPHOCYTES NFR BLD AUTO: 22.4 % (ref 20–44)
MAGNESIUM SERPL-MCNC: 2 MG/DL (ref 1.8–2.4)
MCH RBC QN AUTO: 29 PG (ref 26–33)
MCHC RBC AUTO-ENTMCNC: 34 G/DL (ref 31–36)
MCV RBC AUTO: 86 FL (ref 82–100)
MONOCYTES NFR BLD AUTO: 0.7 /CMM (ref 0.1–1.3)
MONOCYTES NFR BLD AUTO: 8.3 % (ref 2–12)
NEUTROPHILS # BLD AUTO: 5.1 /CMM (ref 1.8–8.9)
NEUTROPHILS NFR BLD AUTO: 64.9 % (ref 43–81)
PLATELET # BLD AUTO: 324 /CMM (ref 150–450)
POTASSIUM SERPL-SCNC: 3.8 MMOL/L (ref 3.5–5.1)
RBC # BLD AUTO: 3.93 MIL/UL (ref 4–5.2)
RDW COEFFICIENT OF VARIATION: 14.1 (ref 11.5–15)
SODIUM SERPL-SCNC: 138 MMOL/L (ref 136–145)
WBC NRBC COR # BLD AUTO: 7.9 K/UL (ref 4.3–11)

## 2018-03-26 RX ADMIN — MEROPENEM SCH MLS/HR: 500 INJECTION INTRAVENOUS at 03:05

## 2018-03-26 RX ADMIN — QUETIAPINE SCH MG: 25 TABLET, FILM COATED ORAL at 07:57

## 2018-03-26 RX ADMIN — CLOTRIMAZOLE SCH GM: 1 CREAM TOPICAL at 08:07

## 2018-03-26 RX ADMIN — MEROPENEM SCH MLS/HR: 500 INJECTION INTRAVENOUS at 15:00

## 2018-03-26 RX ADMIN — AMLODIPINE BESYLATE SCH MG: 10 TABLET ORAL at 07:58

## 2018-03-26 RX ADMIN — MUPIROCIN SCH APPLIC: 20 OINTMENT TOPICAL at 08:09

## 2018-03-26 RX ADMIN — METOPROLOL SUCCINATE SCH MG: 50 TABLET, EXTENDED RELEASE ORAL at 07:59

## 2018-03-26 RX ADMIN — ATORVASTATIN CALCIUM SCH MG: 10 TABLET, FILM COATED ORAL at 07:57

## 2018-03-26 RX ADMIN — INSULIN HUMAN PRN UNIT: 100 INJECTION, SOLUTION PARENTERAL at 12:18

## 2018-03-26 RX ADMIN — THERA TABS SCH UDTAB: TAB at 07:57

## 2018-03-26 RX ADMIN — Medication SCH EACH: at 06:36

## 2018-03-26 RX ADMIN — DIVALPROEX SODIUM SCH MG: 125 CAPSULE ORAL at 07:57

## 2018-03-26 RX ADMIN — ASPIRIN 81 MG SCH MG: 81 TABLET ORAL at 07:57

## 2018-03-26 RX ADMIN — QUETIAPINE PRN MG: 25 TABLET, FILM COATED ORAL at 05:43

## 2018-03-26 RX ADMIN — INSULIN ASPART SCH UNIT: 100 INJECTION, SOLUTION INTRAVENOUS; SUBCUTANEOUS at 06:38

## 2018-03-26 RX ADMIN — Medication SCH EACH: at 12:16

## 2018-03-26 RX ADMIN — DIVALPROEX SODIUM SCH MG: 125 CAPSULE ORAL at 12:16

## 2018-03-26 RX ADMIN — QUETIAPINE PRN MG: 25 TABLET, FILM COATED ORAL at 06:43

## 2018-03-26 RX ADMIN — INSULIN ASPART SCH UNIT: 100 INJECTION, SOLUTION INTRAVENOUS; SUBCUTANEOUS at 13:03

## 2018-03-26 RX ADMIN — OXYCODONE HYDROCHLORIDE AND ACETAMINOPHEN SCH MG: 500 TABLET ORAL at 07:56

## 2018-03-26 RX ADMIN — Medication SCH ML: at 08:00

## 2018-03-26 RX ADMIN — LOSARTAN POTASSIUM SCH MG: 50 TABLET, FILM COATED ORAL at 07:59

## 2018-03-26 RX ADMIN — INSULIN HUMAN PRN UNIT: 100 INJECTION, SOLUTION PARENTERAL at 06:39

## 2018-03-26 RX ADMIN — Medication SCH APPLIC: at 08:09

## 2018-04-04 ENCOUNTER — HOSPITAL ENCOUNTER (INPATIENT)
Dept: HOSPITAL 54 - ER | Age: 83
LOS: 4 days | Discharge: HOME HEALTH | DRG: 689 | End: 2018-04-08
Attending: NURSE PRACTITIONER | Admitting: NURSE PRACTITIONER
Payer: MEDICARE

## 2018-04-04 VITALS — DIASTOLIC BLOOD PRESSURE: 95 MMHG | SYSTOLIC BLOOD PRESSURE: 128 MMHG

## 2018-04-04 VITALS — BODY MASS INDEX: 23.41 KG/M2 | WEIGHT: 124 LBS | HEIGHT: 61 IN

## 2018-04-04 VITALS — SYSTOLIC BLOOD PRESSURE: 144 MMHG | DIASTOLIC BLOOD PRESSURE: 70 MMHG

## 2018-04-04 DIAGNOSIS — D63.8: ICD-10-CM

## 2018-04-04 DIAGNOSIS — I10: ICD-10-CM

## 2018-04-04 DIAGNOSIS — R53.81: ICD-10-CM

## 2018-04-04 DIAGNOSIS — F03.91: ICD-10-CM

## 2018-04-04 DIAGNOSIS — N39.0: Primary | ICD-10-CM

## 2018-04-04 DIAGNOSIS — E11.65: ICD-10-CM

## 2018-04-04 DIAGNOSIS — N17.0: ICD-10-CM

## 2018-04-04 DIAGNOSIS — K21.9: ICD-10-CM

## 2018-04-04 DIAGNOSIS — E78.5: ICD-10-CM

## 2018-04-04 DIAGNOSIS — E44.0: ICD-10-CM

## 2018-04-04 DIAGNOSIS — F39: ICD-10-CM

## 2018-04-04 DIAGNOSIS — G93.41: ICD-10-CM

## 2018-04-04 DIAGNOSIS — B96.89: ICD-10-CM

## 2018-04-04 LAB
ALBUMIN SERPL BCP-MCNC: 2.7 G/DL (ref 3.4–5)
ALP SERPL-CCNC: 70 U/L (ref 46–116)
ALT SERPL W P-5'-P-CCNC: 17 U/L (ref 12–78)
APTT PPP: 25 SEC (ref 23–34)
AST SERPL W P-5'-P-CCNC: 12 U/L (ref 15–37)
BASOPHILS # BLD AUTO: 0 /CMM (ref 0–0.2)
BASOPHILS NFR BLD AUTO: 0.4 % (ref 0–2)
BILIRUB DIRECT SERPL-MCNC: 0.2 MG/DL (ref 0–0.2)
BILIRUB SERPL-MCNC: 0.5 MG/DL (ref 0.2–1)
BUN SERPL-MCNC: 26 MG/DL (ref 7–18)
CALCIUM SERPL-MCNC: 8.3 MG/DL (ref 8.5–10.1)
CHLORIDE SERPL-SCNC: 102 MMOL/L (ref 98–107)
CO2 SERPL-SCNC: 26 MMOL/L (ref 21–32)
CREAT SERPL-MCNC: 1.1 MG/DL (ref 0.6–1.3)
EOSINOPHIL NFR BLD AUTO: 1.2 % (ref 0–6)
GLUCOSE SERPL-MCNC: 329 MG/DL (ref 74–106)
GLUCOSE UR STRIP-MCNC: (no result) MG/DL
HCT VFR BLD AUTO: 34 % (ref 33–45)
HGB BLD-MCNC: 11.3 G/DL (ref 11.5–14.8)
INR PPP: 1 (ref 0.85–1.15)
LYMPHOCYTES NFR BLD AUTO: 1.7 /CMM (ref 0.8–4.8)
LYMPHOCYTES NFR BLD AUTO: 24.6 % (ref 20–44)
MCHC RBC AUTO-ENTMCNC: 34 G/DL (ref 31–36)
MCV RBC AUTO: 85 FL (ref 82–100)
MONOCYTES NFR BLD AUTO: 0.9 /CMM (ref 0.1–1.3)
MONOCYTES NFR BLD AUTO: 12.9 % (ref 2–12)
NEUTROPHILS # BLD AUTO: 4.3 /CMM (ref 1.8–8.9)
NEUTROPHILS NFR BLD AUTO: 60.9 % (ref 43–81)
PH UR STRIP: 5.5 [PH] (ref 5–8)
PLATELET # BLD AUTO: 359 /CMM (ref 150–450)
POTASSIUM SERPL-SCNC: 3.8 MMOL/L (ref 3.5–5.1)
PROT SERPL-MCNC: 7.6 G/DL (ref 6.4–8.2)
PROT UR QL STRIP: 100 MG/DL
RBC # BLD AUTO: 3.96 MIL/UL (ref 4–5.2)
RBC #/AREA URNS HPF: (no result) /HPF (ref 0–2)
RDW COEFFICIENT OF VARIATION: 12.9 (ref 11.5–15)
SODIUM SERPL-SCNC: 137 MMOL/L (ref 136–145)
TROPONIN I SERPL-MCNC: < 0.017 NG/ML (ref 0–0.06)
UROBILINOGEN UR STRIP-MCNC: 0.2 EU/DL
WBC #/AREA URNS HPF: (no result) /HPF (ref 0–3)
WBC NRBC COR # BLD AUTO: 7 K/UL (ref 4.3–11)

## 2018-04-04 PROCEDURE — A4216 STERILE WATER/SALINE, 10 ML: HCPCS

## 2018-04-04 PROCEDURE — A4606 OXYGEN PROBE USED W OXIMETER: HCPCS

## 2018-04-04 PROCEDURE — Z7610: HCPCS

## 2018-04-04 RX ADMIN — DIVALPROEX SODIUM SCH MG: 125 CAPSULE ORAL at 17:32

## 2018-04-04 RX ADMIN — INSULIN HUMAN PRN UNIT: 100 INJECTION, SOLUTION PARENTERAL at 17:38

## 2018-04-04 RX ADMIN — INSULIN GLARGINE SCH UNIT: 100 INJECTION, SOLUTION SUBCUTANEOUS at 17:35

## 2018-04-04 RX ADMIN — METFORMIN HYDROCHLORIDE SCH MG: 500 TABLET, FILM COATED ORAL at 17:32

## 2018-04-04 RX ADMIN — INSULIN HUMAN PRN UNIT: 100 INJECTION, SOLUTION PARENTERAL at 23:04

## 2018-04-04 RX ADMIN — Medication SCH ML: at 18:57

## 2018-04-04 RX ADMIN — Medication SCH EACH: at 17:36

## 2018-04-04 RX ADMIN — Medication SCH EACH: at 22:00

## 2018-04-04 RX ADMIN — SODIUM CHLORIDE PRN MLS/HR: 9 INJECTION, SOLUTION INTRAVENOUS at 15:56

## 2018-04-04 RX ADMIN — MEROPENEM SCH MLS/HR: 1 INJECTION INTRAVENOUS at 21:00

## 2018-04-04 RX ADMIN — QUETIAPINE SCH MG: 25 TABLET, FILM COATED ORAL at 17:32

## 2018-04-04 RX ADMIN — CLOTRIMAZOLE SCH GM: 1 CREAM TOPICAL at 17:36

## 2018-04-04 NOTE — NUR
BB SON C/O WEAKNESS AND BLOOD IN URINE NOTICED TODAY. PATIENT IS A/OX 2, 
BREATHING EVEN AND UNLABORED. NO SOB, NAD, VITALS STABLE. SAFETY AND COMFORT 
MEASURES IN PLACE. AWAITING MD ORDERS.

## 2018-04-04 NOTE — NUR
PATIENT RESTLESS, CONSTANTLY CLIMBING OUT OF BED, RISK FOR FALLS.  ORIENTED 
PATIENT NUMEROUS TIMES, WITH NO IMPROVEMENT.  MD INFORMED, ORDERD ZYPREXA IM 
5MG.  GIVEN PER MD ORDERS.

## 2018-04-04 NOTE — NUR
PT IS ASSIGNED TO MED/SURG RM #[id number], DIAGNOSED WITH SEPSIS AND WEAKNESS, AND 
NATE RICHARDSON IS THE ADMITTING NP

## 2018-04-04 NOTE — NUR
MS RN NOTE:

PATIENT 81 Y/O FEMALE ADMITTED TO MS DIAGNOSIS UTI, FROM HOME LIVING WITH HER SON . PT VSS , 
PT CONFUSED A/O X1 RESTLESS AT TIMES, NO S/S DISTRESS NOTED. SKIN ASSESSMENT DONE NOTED 
SACRAL OPENING , PERINEUM READNESS /FUNGUS , LEFT UPPER ARM DISCOLORATION . WOUND CONSULT 
ORDER, PT COMBATIVE WHEN TOUCHED UNABLE TO TAKE FULL PICTURES WILL INDORSE TO INCOMING SHIFT 
RN FOR CONTINUATION AND CARE.  

-------------------------------------------------------------------------------

Addendum: 04/04/18 at 1846 by CELINA WILLS RN

-------------------------------------------------------------------------------

PT NOTED LEFT FOOT DISCOLORATION ,SCAB, RIGHT FOREHEAD, SCAB WITH DISCOLORATION WILL INDORSE 
TO INCOMING SHIFT RN

## 2018-04-04 NOTE — NUR
RECIEVED ALERT ONLY TO NAME EYE CONTACT PRESENT SPEECH CLEAR WILL FOLLOW SIMPLE DIRECTION.  
BANDAR AREA RED NOTED FINGAL CREAM ORDERED

## 2018-04-05 VITALS — SYSTOLIC BLOOD PRESSURE: 118 MMHG | DIASTOLIC BLOOD PRESSURE: 53 MMHG

## 2018-04-05 VITALS — DIASTOLIC BLOOD PRESSURE: 60 MMHG | SYSTOLIC BLOOD PRESSURE: 140 MMHG

## 2018-04-05 VITALS — DIASTOLIC BLOOD PRESSURE: 63 MMHG | SYSTOLIC BLOOD PRESSURE: 118 MMHG

## 2018-04-05 VITALS — DIASTOLIC BLOOD PRESSURE: 75 MMHG | SYSTOLIC BLOOD PRESSURE: 126 MMHG

## 2018-04-05 LAB
BASOPHILS # BLD AUTO: 0 /CMM (ref 0–0.2)
BASOPHILS NFR BLD AUTO: 0.3 % (ref 0–2)
BUN SERPL-MCNC: 18 MG/DL (ref 7–18)
CALCIUM SERPL-MCNC: 7.9 MG/DL (ref 8.5–10.1)
CHLORIDE SERPL-SCNC: 111 MMOL/L (ref 98–107)
CHOLEST SERPL-MCNC: 106 MG/DL (ref ?–200)
CO2 SERPL-SCNC: 26 MMOL/L (ref 21–32)
CREAT SERPL-MCNC: 0.9 MG/DL (ref 0.6–1.3)
EOSINOPHIL NFR BLD AUTO: 1.5 % (ref 0–6)
GLUCOSE SERPL-MCNC: 67 MG/DL (ref 74–106)
HCT VFR BLD AUTO: 31 % (ref 33–45)
HDLC SERPL-MCNC: 41 MG/DL (ref 40–60)
HGB BLD-MCNC: 10.4 G/DL (ref 11.5–14.8)
LDLC SERPL DIRECT ASSAY-MCNC: 58 MG/DL (ref 0–99)
LYMPHOCYTES NFR BLD AUTO: 1.5 /CMM (ref 0.8–4.8)
LYMPHOCYTES NFR BLD AUTO: 20 % (ref 20–44)
MAGNESIUM SERPL-MCNC: 1.5 MG/DL (ref 1.8–2.4)
MCHC RBC AUTO-ENTMCNC: 34 G/DL (ref 31–36)
MCV RBC AUTO: 87 FL (ref 82–100)
MONOCYTES NFR BLD AUTO: 0.8 /CMM (ref 0.1–1.3)
MONOCYTES NFR BLD AUTO: 10.9 % (ref 2–12)
NEUTROPHILS # BLD AUTO: 4.9 /CMM (ref 1.8–8.9)
NEUTROPHILS NFR BLD AUTO: 67.3 % (ref 43–81)
PHOSPHATE SERPL-MCNC: 3.5 MG/DL (ref 2.5–4.9)
PLATELET # BLD AUTO: 322 /CMM (ref 150–450)
POTASSIUM SERPL-SCNC: 3.5 MMOL/L (ref 3.5–5.1)
RBC # BLD AUTO: 3.53 MIL/UL (ref 4–5.2)
RDW COEFFICIENT OF VARIATION: 14.1 (ref 11.5–15)
SODIUM SERPL-SCNC: 145 MMOL/L (ref 136–145)
TRIGL SERPL-MCNC: 70 MG/DL (ref 30–150)
TSH SERPL DL<=0.005 MIU/L-ACNC: 2 UIU/ML (ref 0.36–3.74)
WBC NRBC COR # BLD AUTO: 7.4 K/UL (ref 4.3–11)

## 2018-04-05 RX ADMIN — CLOTRIMAZOLE SCH GM: 1 CREAM TOPICAL at 17:00

## 2018-04-05 RX ADMIN — INSULIN HUMAN PRN UNIT: 100 INJECTION, SOLUTION PARENTERAL at 18:59

## 2018-04-05 RX ADMIN — METOPROLOL SUCCINATE SCH MG: 50 TABLET, EXTENDED RELEASE ORAL at 08:58

## 2018-04-05 RX ADMIN — DIVALPROEX SODIUM SCH MG: 125 CAPSULE ORAL at 17:00

## 2018-04-05 RX ADMIN — Medication SCH EACH: at 06:32

## 2018-04-05 RX ADMIN — MAGNESIUM SULFATE IN DEXTROSE SCH MLS/HR: 10 INJECTION, SOLUTION INTRAVENOUS at 13:45

## 2018-04-05 RX ADMIN — Medication SCH EACH: at 18:59

## 2018-04-05 RX ADMIN — Medication SCH ML: at 08:59

## 2018-04-05 RX ADMIN — INSULIN GLARGINE SCH UNIT: 100 INJECTION, SOLUTION SUBCUTANEOUS at 17:00

## 2018-04-05 RX ADMIN — QUETIAPINE SCH MG: 25 TABLET, FILM COATED ORAL at 17:00

## 2018-04-05 RX ADMIN — Medication SCH ML: at 17:00

## 2018-04-05 RX ADMIN — LOSARTAN POTASSIUM SCH MG: 50 TABLET, FILM COATED ORAL at 08:59

## 2018-04-05 RX ADMIN — THERA TABS SCH UDTAB: TAB at 09:40

## 2018-04-05 RX ADMIN — DIVALPROEX SODIUM SCH MG: 125 CAPSULE ORAL at 12:40

## 2018-04-05 RX ADMIN — ATORVASTATIN CALCIUM SCH MG: 10 TABLET, FILM COATED ORAL at 08:57

## 2018-04-05 RX ADMIN — MAGNESIUM SULFATE IN DEXTROSE SCH MLS/HR: 10 INJECTION, SOLUTION INTRAVENOUS at 12:40

## 2018-04-05 RX ADMIN — QUETIAPINE SCH MG: 25 TABLET, FILM COATED ORAL at 08:58

## 2018-04-05 RX ADMIN — AMLODIPINE BESYLATE SCH MG: 10 TABLET ORAL at 08:59

## 2018-04-05 RX ADMIN — ASPIRIN 81 MG SCH MG: 81 TABLET ORAL at 08:57

## 2018-04-05 RX ADMIN — MEROPENEM SCH MLS/HR: 1 INJECTION INTRAVENOUS at 20:39

## 2018-04-05 RX ADMIN — SODIUM CHLORIDE PRN MLS/HR: 9 INJECTION, SOLUTION INTRAVENOUS at 05:44

## 2018-04-05 RX ADMIN — CLOTRIMAZOLE SCH GM: 1 CREAM TOPICAL at 09:33

## 2018-04-05 RX ADMIN — METFORMIN HYDROCHLORIDE SCH MG: 500 TABLET, FILM COATED ORAL at 17:00

## 2018-04-05 RX ADMIN — MEROPENEM SCH MLS/HR: 1 INJECTION INTRAVENOUS at 10:01

## 2018-04-05 RX ADMIN — INSULIN GLARGINE SCH UNIT: 100 INJECTION, SOLUTION SUBCUTANEOUS at 09:00

## 2018-04-05 RX ADMIN — Medication SCH EACH: at 12:40

## 2018-04-05 RX ADMIN — METFORMIN HYDROCHLORIDE SCH MG: 500 TABLET, FILM COATED ORAL at 08:57

## 2018-04-05 RX ADMIN — DIVALPROEX SODIUM SCH MG: 125 CAPSULE ORAL at 08:58

## 2018-04-05 NOTE — NUR
PATIENT'S BLOOD GLUCOSE IS 83 MG/DL. BLOOD GLUCOSE NOTED DECREASED THROUGHOUT THE NIGHT. 
PATIENT PRESENTS WITH POOR APPETITE AND POOR ORAL INTAKE. HOLDING LANTUS AT THIS TIME. ABBY DUGGAN.

## 2018-04-05 NOTE — NUR
ATTEMPTED TO CALL PATIENT'S SON NASRA TO ASK IF FAMILY MEMBERS COULD COME BE WITH THE PATIENT 
TO HELP DECREASE AGITATION.  THE SOS DID NOT ANSWER THE PHONE. DID NOT LEAVE A MESSAGE. WILL 
ATTEMPT TO CALL LATER.
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
## 2018-04-05 NOTE — NUR
SPOKE TO THE SON ON THE PHONE. THE SON STATED THAT THE MOTHER IS AGITATED AND AGGRESSIVE AT 
HOME AS WELL. SON STATED HE WILL COME TO VISIT THE MOTHER AND SPEND SOME TIME AT THE BEDSIDE 
TO ASSURE HER SAFETY AND FALL PREVENTION.

## 2018-04-05 NOTE — NUR
MS RN OPENING NOTE

GAVE  BEDSIDE SBAR REPORT ON THE PATIENT IS ON CONTACT ISOLATION. PATIENT IS ORIENTED TO 
SELF ONLY, CONFUSED, AGITATED, COMBATIVE. ATIVAN IM ADMINISTERED AS ORDERED. SOFT WRIST 
RESTRAINT APPLIED.  CURRENTLY SLEEPING, EASILY AWAKEN IN BED. BED IS LOCKED IN LOWEST 
POSITION, SIDE RAILS UP X3, BED ALARM IS ON. CALL LIGHT WITHIN REACH. EDUCATED TO CALL FOR 
ASSISTANCE USING THE CALL LIGHT. PATIENT PRESENTS WITH POOR CONCENTRATION AND IS UNABLE TO 
VERBALIZED UNDERSTANDING. PATIENT'S ROOM IS CLOSE TO THE NURSES STATION FOR CONSTANT VISUAL 
AVAILABILITY OF THE PATIENT/SURROUNDINGS.  ALL NEEDS ARE MET. ENDORSED TO THE NIGHT SHIFT 
NURSE BERNIE FOR ROSSI. 

-------------------------------------------------------------------------------

Addendum: 04/05/18 at 2000 by GARY SANTOS RN

-------------------------------------------------------------------------------

CLOSING NOTE

## 2018-04-05 NOTE — NUR
ABBY RICHARDSON AT THE BEDSIDE. RN REPORTED PATIENT IS COMBATIVE, UNCOOPERATIVE, AGITATED. RN 
RECEIVED VERBAL ORDER OF ATIVAN 0.5 MG IV ONCE NOW. READ BACK AND VERIFIED.  WILL FOLLOW 
ORDER AS RECEIVED.

## 2018-04-05 NOTE — NUR
MS RN OPENING NOTE

RECEIVED BEDSIDE SBAR REPORT ON THE PATIENT

PATIENT IS ON CONTACT ISOLATION. PATIENT IS ORIENTED TO SELF ONLY, CONFUSED. NIGHT SHIFT 
NURSE REPORTED LAST BLOOD GLUCOSE READING 70MG/DL. RN ATTEMPTED TO GIVE SOME ORANGE JUICE TO 
PATIENT. PATIENT TOOK A SIP AND ATTEMPTED TO PUSH THE CUP AWAY BY PUSHING THE RN'S ARM. 
PATIENT IS CALMLY SLEEPING IN BED, BUT BECOMES AGITATED WHEN MOVED/TOUCHED. REFUSED TO DRINK 
MORE ORANGE JUICE. ATTEMPTED TO OFFER BOOST. PATIENT REFUSED. WILL ATTEMPT AGAIN. RN 
ATTEMPTED TO PUT DVT PUMPS ON THE PATIENT. PATIENT STARTED KICKING THE LEGS AND SCHEMING 
"NO!" CURRENTLY SLEEPING, EASILY AWAKEN IN BED. BED IS LOCKED IN LOWEST POSITION, SIDE RAILS 
UP X3, BED ALARM IS ON. CALL LIGHT WITHIN REACH. EDUCATED TO CALL FOR ASSISTANCE USING THE 
CALL LIGHT. PATIENT PRESENTS WITH POOR CONCENTRATION AND IS UNABLE TO VERBALIZED 
UNDERSTANDING. PATIENT'S ROOM IS CLOSE TO THE NURSES STATION FOR CONSTANT VISUAL 
AVAILABILITY OF THE PATIENT/SURROUNDINGS.  ALL NEEDS ARE MET. WILL CONTINUE TO 
ASSESS/MONITOR THROUGHOUT THE SHIFT.

## 2018-04-05 NOTE — NUR
PER ABBY RICHARDSON ORDER PSYCH CONSULT FOR PATIENT. ORDER READ BACK AND VERIFIED. FACE SHEET 
FAXED TO [employer]. SPOKE TO ROB IN [employer] AND CONFIRMED FAX WAS RECEIVE.D

## 2018-04-05 NOTE — NUR
PATIENT PULLED OUT HER IV CATHETER. TIP INTACT. OCLUSIVE DRESSING APPLIED. LFA g 22 IV 
CATHETER INSERTED.
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
## 2018-04-05 NOTE — NUR
MS RN OPENING NOTES

RECEIVED PT IN BED ASLEEP, ON ROOM AIR, RESPIRATIONS EVEN, UNLABORED NO APPARENT DISTRESS 
NOTED. NO S/SX OF PAIN OR DISCOMFORT NOTED.  IV SITE LT FA INTACT, PATENT.BED LOCKED IN 
LOWEST POSITION. ATTENDED ALL NEEDS. KEPT CLEAN AND COMFORTABLE. WILL CONTINUE TO MONITOR 
ACCORDINGLY.

## 2018-04-05 NOTE — NUR
MS RN NOTES

RECEIVED REPORT FROM SAGE LLANOS,PATIENT ON BED A/O X1-2,CONFUSED,COMBATIVE AT TIMES.IVF NS 
AT 75ML/HR RATE INFUSING ON RFA VIA IV PUMP.SITTER AT BEDSIDE FOR SAFETY.REPOSITION PER 
PROTOCOL.WILL CONTINUE TO MONITOR STATUS.

## 2018-04-05 NOTE — NUR
PATIENT ATTEMPTED TO PULL OUT HER IV. RN TEMPORARILY DISCONNECTED THE IV. PATIENT SEEMS 
AGITATED. DOES NOT APPEAR TO UNDERSTAND ENGLISH.  MOST PROBABLY [place] SPEAKER PATIENT 
ATTEMPTED TO GET OUT OF THE BED. RN SUGGESTED TO PLACE A SITTER IN PATIENT'S ROOM FOR 
SAFETY. PER CHARGE NURSE SITTERS ARE CURRENTLY UNAVAILABLE

## 2018-04-05 NOTE — NUR
MS RN NOTES

NO SIGNIFICANT CHANGE IN STATUS.COMBATIVE WITH CARE.IN NO ACUTE DISTRESS.WILL ENDORSE TO DAY 
NURSE FOR ROSSI.

## 2018-04-06 VITALS — SYSTOLIC BLOOD PRESSURE: 139 MMHG | DIASTOLIC BLOOD PRESSURE: 61 MMHG

## 2018-04-06 VITALS — SYSTOLIC BLOOD PRESSURE: 127 MMHG | DIASTOLIC BLOOD PRESSURE: 61 MMHG

## 2018-04-06 VITALS — DIASTOLIC BLOOD PRESSURE: 67 MMHG | SYSTOLIC BLOOD PRESSURE: 138 MMHG

## 2018-04-06 RX ADMIN — AMLODIPINE BESYLATE SCH MG: 10 TABLET ORAL at 08:38

## 2018-04-06 RX ADMIN — METFORMIN HYDROCHLORIDE SCH MG: 500 TABLET, FILM COATED ORAL at 17:20

## 2018-04-06 RX ADMIN — DIVALPROEX SODIUM SCH MG: 125 CAPSULE ORAL at 13:14

## 2018-04-06 RX ADMIN — METFORMIN HYDROCHLORIDE SCH MG: 500 TABLET, FILM COATED ORAL at 09:54

## 2018-04-06 RX ADMIN — Medication SCH EACH: at 12:00

## 2018-04-06 RX ADMIN — THERA TABS SCH UDTAB: TAB at 08:35

## 2018-04-06 RX ADMIN — Medication SCH EACH: at 17:31

## 2018-04-06 RX ADMIN — Medication SCH EACH: at 05:33

## 2018-04-06 RX ADMIN — ATORVASTATIN CALCIUM SCH MG: 10 TABLET, FILM COATED ORAL at 08:35

## 2018-04-06 RX ADMIN — INSULIN HUMAN PRN UNIT: 100 INJECTION, SOLUTION PARENTERAL at 00:46

## 2018-04-06 RX ADMIN — INSULIN HUMAN PRN UNIT: 100 INJECTION, SOLUTION PARENTERAL at 21:04

## 2018-04-06 RX ADMIN — Medication SCH ML: at 17:19

## 2018-04-06 RX ADMIN — LOSARTAN POTASSIUM SCH MG: 50 TABLET, FILM COATED ORAL at 08:38

## 2018-04-06 RX ADMIN — DIVALPROEX SODIUM SCH MG: 125 CAPSULE ORAL at 08:35

## 2018-04-06 RX ADMIN — CLOTRIMAZOLE SCH GM: 1 CREAM TOPICAL at 17:20

## 2018-04-06 RX ADMIN — INSULIN GLARGINE SCH UNIT: 100 INJECTION, SOLUTION SUBCUTANEOUS at 17:30

## 2018-04-06 RX ADMIN — INSULIN HUMAN PRN UNIT: 100 INJECTION, SOLUTION PARENTERAL at 17:36

## 2018-04-06 RX ADMIN — DIVALPROEX SODIUM SCH MG: 125 CAPSULE ORAL at 17:20

## 2018-04-06 RX ADMIN — INSULIN GLARGINE SCH UNIT: 100 INJECTION, SOLUTION SUBCUTANEOUS at 09:54

## 2018-04-06 RX ADMIN — Medication SCH EACH: at 21:01

## 2018-04-06 RX ADMIN — Medication SCH ML: at 08:35

## 2018-04-06 RX ADMIN — MEROPENEM SCH MLS/HR: 1 INJECTION INTRAVENOUS at 08:48

## 2018-04-06 RX ADMIN — ASPIRIN 81 MG SCH MG: 81 TABLET ORAL at 08:35

## 2018-04-06 RX ADMIN — MEROPENEM SCH MLS/HR: 1 INJECTION INTRAVENOUS at 20:29

## 2018-04-06 RX ADMIN — INSULIN GLARGINE SCH UNIT: 100 INJECTION, SOLUTION SUBCUTANEOUS at 08:43

## 2018-04-06 RX ADMIN — Medication SCH EACH: at 00:44

## 2018-04-06 RX ADMIN — CLOTRIMAZOLE SCH GM: 1 CREAM TOPICAL at 08:51

## 2018-04-06 RX ADMIN — METFORMIN HYDROCHLORIDE SCH MG: 500 TABLET, FILM COATED ORAL at 08:44

## 2018-04-06 RX ADMIN — METOPROLOL SUCCINATE SCH MG: 50 TABLET, EXTENDED RELEASE ORAL at 08:39

## 2018-04-06 RX ADMIN — QUETIAPINE SCH MG: 25 TABLET, FILM COATED ORAL at 08:35

## 2018-04-06 NOTE — NUR
WOUND CARE CONSULT: RECEIVED CONSULT FOR SACRAL AREA. PT PRESENTS WITH SACRAL/BUTTOCKS 
SCARRING. PT IS VERY COMBATIVE AND STRIKES OUT AT NURSING STAFF. ALL SKIN PROTECTION 
MEASURES IN PLACE. DISCUSSED WITH NURSING STAFF. CURRENT CRISS SCORE IS 14. WILL SEE PRN. 
MD IN AGREEMENT WITH PLAN OF CARE

-------------------------------------------------------------------------------

Addendum: 04/06/18 at 1038 by TIFFANY HAMPTON WNDNU

-------------------------------------------------------------------------------

Amended: Links added.

## 2018-04-06 NOTE — NUR
RN NOTES

PATIENT ALERT AND ORIENTED X1, STILL NOTED WITH AGITATION, BILATERAL SOFT WRIST RESTRAINTS 
IN PLACED, PATIENT ABLE TO REMOVE AT TIMES AND PULLED OUT PIV. PIV RE-INSERTED ON LEFT FA 
#22 I

-------------------------------------------------------------------------------

Addendum: 04/06/18 at 1844 by COREY NAIR RN

-------------------------------------------------------------------------------

ADDENDUM:

TURNED AND REPOSITIONED WHEN PERMITTED BY PATIENT, SKIN CARE RENDERED, NEEDS ATTENDED AND 
MET, CALL LIGHT WITHIN REACH, WILL ENDORSE TO NIGHT SHIFT FOR ROSSI.

## 2018-04-06 NOTE — NUR
RN NOTES



RECEIVE PT IN BED A/O X 1,  NO S/S OF DISTRESS, STABLE, SAFETY MEASURES IN PLACE, CALL LIGHT 
WITHIN REACH, WILL CONTINUE TO MONITOR

## 2018-04-06 NOTE — NUR
RN NOTES

PATIENT ASLEEP BUT EASILY AROUSABLE, UNABLE TO DISTINGUISH PATIENT'S LANGUAGE, ON BILATERAL 
SOFT WRIST RESTRAINTS, ON IVF INFUSING AND TOLERATING WELL, NEEDS ATTENDED AND ANTICIPATED, 
CALL LIGHT WITHIN REACH, WILL CONTINUET O MONITOR.

## 2018-04-06 NOTE — NUR
MS RN CLOSING NOTES

PT IN BED RESTING COMFORTABLY, ON ROOM AIR, RESPIRATIONS EVEN, UNLABORED, NO APPARENT 
DISTRESS NOTED. NO S/SX OF PAIN OR DISCOMFORT NOTED. IV SITE INTACT, PATENT. KEPT CLEAN AND 
COMFORTABLE, ATTENDED ALL NEEDS. WILL ENDORSE TO DAY SHIFT FOR CONTINUITY OF CARE.

## 2018-04-06 NOTE — NUR
RN NOTES

PATIENT'S , PATIENT REFUSING TO EAT, INSULIN HELD AT THIS TIME, PER CNA'S REPORT 
PATIENT IS KICKING AND COMBATIVE. PATIENT IS CURRENTLY ON BILATERAL SOFT WRIST RESTRAINTS. 
SKIN CHECK COMPLETED, CALL LIGHT WITHIN REACH, TURNED AND REPOSITIONED, WILL CONTINUE TO 
MONITOR.

## 2018-04-07 VITALS — DIASTOLIC BLOOD PRESSURE: 62 MMHG | SYSTOLIC BLOOD PRESSURE: 120 MMHG

## 2018-04-07 VITALS — DIASTOLIC BLOOD PRESSURE: 62 MMHG | SYSTOLIC BLOOD PRESSURE: 121 MMHG

## 2018-04-07 VITALS — SYSTOLIC BLOOD PRESSURE: 125 MMHG | DIASTOLIC BLOOD PRESSURE: 60 MMHG

## 2018-04-07 VITALS — SYSTOLIC BLOOD PRESSURE: 136 MMHG | DIASTOLIC BLOOD PRESSURE: 69 MMHG

## 2018-04-07 VITALS — DIASTOLIC BLOOD PRESSURE: 55 MMHG | SYSTOLIC BLOOD PRESSURE: 119 MMHG

## 2018-04-07 RX ADMIN — Medication SCH ML: at 17:39

## 2018-04-07 RX ADMIN — METFORMIN HYDROCHLORIDE SCH MG: 500 TABLET, FILM COATED ORAL at 17:38

## 2018-04-07 RX ADMIN — THERA TABS SCH UDTAB: TAB at 09:21

## 2018-04-07 RX ADMIN — INSULIN HUMAN PRN UNIT: 100 INJECTION, SOLUTION PARENTERAL at 18:01

## 2018-04-07 RX ADMIN — INSULIN HUMAN PRN UNIT: 100 INJECTION, SOLUTION PARENTERAL at 06:05

## 2018-04-07 RX ADMIN — AMLODIPINE BESYLATE SCH MG: 10 TABLET ORAL at 09:00

## 2018-04-07 RX ADMIN — Medication SCH EACH: at 06:05

## 2018-04-07 RX ADMIN — Medication SCH EACH: at 17:57

## 2018-04-07 RX ADMIN — ATORVASTATIN CALCIUM SCH MG: 10 TABLET, FILM COATED ORAL at 09:21

## 2018-04-07 RX ADMIN — DIVALPROEX SODIUM SCH MG: 125 CAPSULE ORAL at 17:38

## 2018-04-07 RX ADMIN — QUETIAPINE SCH MG: 25 TABLET, FILM COATED ORAL at 09:31

## 2018-04-07 RX ADMIN — DIVALPROEX SODIUM SCH MG: 125 CAPSULE ORAL at 09:22

## 2018-04-07 RX ADMIN — Medication SCH ML: at 09:21

## 2018-04-07 RX ADMIN — Medication SCH EACH: at 14:59

## 2018-04-07 RX ADMIN — DIVALPROEX SODIUM SCH MG: 125 CAPSULE ORAL at 12:46

## 2018-04-07 RX ADMIN — METFORMIN HYDROCHLORIDE SCH MG: 500 TABLET, FILM COATED ORAL at 09:21

## 2018-04-07 RX ADMIN — MEROPENEM SCH MLS/HR: 1 INJECTION INTRAVENOUS at 22:06

## 2018-04-07 RX ADMIN — MEROPENEM SCH MLS/HR: 1 INJECTION INTRAVENOUS at 09:44

## 2018-04-07 RX ADMIN — LOSARTAN POTASSIUM SCH MG: 50 TABLET, FILM COATED ORAL at 09:00

## 2018-04-07 RX ADMIN — ASPIRIN 81 MG SCH MG: 81 TABLET ORAL at 09:22

## 2018-04-07 RX ADMIN — CLOTRIMAZOLE SCH GM: 1 CREAM TOPICAL at 09:32

## 2018-04-07 RX ADMIN — METOPROLOL SUCCINATE SCH MG: 50 TABLET, EXTENDED RELEASE ORAL at 09:00

## 2018-04-07 RX ADMIN — INSULIN GLARGINE SCH UNIT: 100 INJECTION, SOLUTION SUBCUTANEOUS at 18:02

## 2018-04-07 RX ADMIN — QUETIAPINE SCH MG: 25 TABLET, FILM COATED ORAL at 12:47

## 2018-04-07 RX ADMIN — INSULIN GLARGINE SCH UNIT: 100 INJECTION, SOLUTION SUBCUTANEOUS at 09:47

## 2018-04-07 RX ADMIN — INSULIN HUMAN PRN UNIT: 100 INJECTION, SOLUTION PARENTERAL at 22:21

## 2018-04-07 RX ADMIN — Medication SCH EACH: at 22:06

## 2018-04-07 RX ADMIN — INSULIN HUMAN PRN UNIT: 100 INJECTION, SOLUTION PARENTERAL at 12:48

## 2018-04-07 RX ADMIN — CLOTRIMAZOLE SCH GM: 1 CREAM TOPICAL at 17:39

## 2018-04-07 RX ADMIN — QUETIAPINE SCH MG: 25 TABLET, FILM COATED ORAL at 17:38

## 2018-04-07 NOTE — NUR
MS RN OPENING NOTES

RECEIVED PT ALERT, AWAKE, ON ROOM AIR, RESPIRATIONS EVEN, UNLABORED , NO APPARENT DISTRESS 
NOTED. NO S/SX OF PAIN OR DISCOMFORT NOTED.IV SITE LFA INTACT, PATENT. CALL LIGHT WITHIN 
REACH. BED LOCKED IN LOWEST POSITION. KEPT CLEAN AND COMFORTABLE, ATTENDED ALL NEEDS. WILL 
CONTINUE TO MONITOR ACCORDINGLY.

## 2018-04-07 NOTE — NUR
MS/RN  OPENING NOTE



PATIENT ALERT AND ORIENTED X1. REDIRECTION AND REORIENTATION PROVIDED. DENIES PAIN AT THIS 
TIME. RESPIRATION REGULAR AND UNLABORED. DENIES SOB AT THIS TIME. OXYGEN SATURATION IN ROOM 
AIR AT 97%. LFA G 22 PATENT AND SALINE LOCKED. BED LOW AND LOCKED. SIDE RAILS UP X3. CALL 
LIGHT WITHIN REACH. WILL CONTINUE TO MONITOR.

## 2018-04-07 NOTE — NUR
MS/RN   CLOSING NOTE



PATIENT ALERT AND ORIENTED TO SELF. REDIRECTION AND REORIENTATION PROVIDED FREQUENTLY. 
DENIES SOB. RESPIRATION REGULAR AND UNLABORED. O2 SATURATION IN ROOM AIR AT 96%. DENIES 
PAIN. PATIENT IN NO APPARENT DISTRESS. NOTE. LFA G 22 PATENT AND SALINE LOCKED. ALL DUE 
MEDICATIONS GIVEN AS ORDERED AND NO ASE NOTED. PATIENT IS INCONTINENT. GOOD AND GENTLE SKIN 
CARE RENDERED. KEPT CLEAN, DRY AND COMFORTABLE. BED LOW AND LOCKED. BED ALARM ON. SIDE RAILS 
UP X3. CALL LIGHT WITHIN REACH. WILL ENDORSE TO NIGHT SHIFT.

## 2018-04-07 NOTE — NUR
MS RN NOTES



PT ASLEEP COMFORTABLY IN BED AND EASILY AWAKEN SEMI FOWLERS POSITION TOLERATING ROOM AIR 
98%,  NOT IN RESPIRATORY DISTRESS.  STABLE CONDITION. NO ACUTE CHANGES THROUGHOUT THE SHIFT. 
 KEPT CLEAN AND DRY AND COMFORTABLE. NURSING CARE RENDERED. NEEDS ATTENDED AND ANTICIPATED. 
REPOSITION EVERY 2 HOURS FOR COMFORT AND MGT. GOOD SKIN CARE PROVIDED.  ON LOW BED TO ENSURE 
SAFETY, CALL LIGHT WITHIN REACH, WILL ENDORSE TO THE NEXT SHIFT CONTINUE PLAN OF CARE

## 2018-04-08 VITALS — DIASTOLIC BLOOD PRESSURE: 69 MMHG | SYSTOLIC BLOOD PRESSURE: 134 MMHG

## 2018-04-08 VITALS — SYSTOLIC BLOOD PRESSURE: 133 MMHG | DIASTOLIC BLOOD PRESSURE: 67 MMHG

## 2018-04-08 RX ADMIN — LOSARTAN POTASSIUM SCH MG: 50 TABLET, FILM COATED ORAL at 08:57

## 2018-04-08 RX ADMIN — INSULIN GLARGINE SCH UNIT: 100 INJECTION, SOLUTION SUBCUTANEOUS at 08:56

## 2018-04-08 RX ADMIN — CLOTRIMAZOLE SCH GM: 1 CREAM TOPICAL at 17:51

## 2018-04-08 RX ADMIN — Medication SCH EACH: at 11:56

## 2018-04-08 RX ADMIN — Medication SCH EACH: at 17:50

## 2018-04-08 RX ADMIN — DIVALPROEX SODIUM SCH MG: 125 CAPSULE ORAL at 17:50

## 2018-04-08 RX ADMIN — THERA TABS SCH UDTAB: TAB at 08:57

## 2018-04-08 RX ADMIN — Medication SCH ML: at 17:50

## 2018-04-08 RX ADMIN — Medication SCH EACH: at 06:06

## 2018-04-08 RX ADMIN — METFORMIN HYDROCHLORIDE SCH MG: 500 TABLET, FILM COATED ORAL at 08:57

## 2018-04-08 RX ADMIN — CLOTRIMAZOLE SCH GM: 1 CREAM TOPICAL at 08:59

## 2018-04-08 RX ADMIN — ATORVASTATIN CALCIUM SCH MG: 10 TABLET, FILM COATED ORAL at 08:57

## 2018-04-08 RX ADMIN — ASPIRIN 81 MG SCH MG: 81 TABLET ORAL at 08:57

## 2018-04-08 RX ADMIN — QUETIAPINE SCH MG: 25 TABLET, FILM COATED ORAL at 13:06

## 2018-04-08 RX ADMIN — METOPROLOL SUCCINATE SCH MG: 50 TABLET, EXTENDED RELEASE ORAL at 08:58

## 2018-04-08 RX ADMIN — DIVALPROEX SODIUM SCH MG: 125 CAPSULE ORAL at 08:33

## 2018-04-08 RX ADMIN — QUETIAPINE SCH MG: 25 TABLET, FILM COATED ORAL at 17:50

## 2018-04-08 RX ADMIN — INSULIN HUMAN PRN UNIT: 100 INJECTION, SOLUTION PARENTERAL at 17:55

## 2018-04-08 RX ADMIN — INSULIN GLARGINE SCH UNIT: 100 INJECTION, SOLUTION SUBCUTANEOUS at 17:56

## 2018-04-08 RX ADMIN — AMLODIPINE BESYLATE SCH MG: 10 TABLET ORAL at 08:58

## 2018-04-08 RX ADMIN — Medication SCH ML: at 08:32

## 2018-04-08 RX ADMIN — MEROPENEM SCH MLS/HR: 1 INJECTION INTRAVENOUS at 08:56

## 2018-04-08 RX ADMIN — DIVALPROEX SODIUM SCH MG: 125 CAPSULE ORAL at 13:06

## 2018-04-08 RX ADMIN — MEROPENEM SCH MLS/HR: 1 INJECTION INTRAVENOUS at 11:50

## 2018-04-08 RX ADMIN — QUETIAPINE SCH MG: 25 TABLET, FILM COATED ORAL at 08:58

## 2018-04-08 RX ADMIN — METFORMIN HYDROCHLORIDE SCH MG: 500 TABLET, FILM COATED ORAL at 17:51

## 2018-04-08 NOTE — NUR
MS RN CLOSING NOTES

PT IN BED RESTING COMFORTABLY. ON ROOM AIR, NO RESPIRATORY DISTRESS NOTED.NO S/SX OF PAIN OR 
DISCOMFORT NOTED. KEPT CLEAN AND COMFORTABLE. ATTENDED ALL NEEDS. WILL ENDORSE TO THE DAY 
SHIFT FOR CONTINUITY OF CARE.

## 2018-04-08 NOTE — NUR
MSRN OPENING NOTES.

PT RECEIVED A&0X1, ASLEEP AND RESPONSIVE TO NAME. SWR INACTIVE AT BEDSIDE. PT TOLERATING 
ROOM AIR WITHOUT RESP DISTRESS. PT IS WITHOUT S/S OF PAIN. PT IVC AT L FOREARM PULLED OUT. 
IVC D/C AND NAD AT SITE. PT UNABLE TO COMPREHEND POC. WILL REORIENTATE AND REASSURE AS 
NEEDED. BED IN LOWEST LOCKED POSITION WITH HANDRAILSX3 AND CALL BELL WITHIN REACH.

## 2018-04-08 NOTE — NUR
MSRN NOTES.

NP CN AWARE OF NO IVC, REQUESTING NEW IVC PLACED FOR LAST IVAB BEFORE DISCHARGE. CASE 
MANAGEMENT REQUESTING IVC PLACED AND LEFT IN SITU FOR HHC USE. LATE IV AB ADMIN APPROVED BY 
NP LINDA.

## 2018-04-08 NOTE — NUR
MSRN D/C NOTES.

PT PREPARED FOR D.C AS PER MD. PT TOLERATING ROOM AIR WITHOUT DISTRESS AND WITHOUT S/S OF 
PAIN OR DISCOMFORT. PT WITH IVC AT R WRIST INTACT AND SALINE LOCKED, IVC REMAINS IN-SITU AS 
PER NP CN AND CM FOR CONTINUED IVAB WITH HHC. PT SON BRIEFED ON SO D/C PACKET, FOLLOW UP 
WITH HHC AND MEDICATIONS AND IS VERBALIZING UNDERSTANDING, RESOURCES AND INTENT TO FOLLOW 
POC. PT WITHOUT BELONGINGS AND DOCUMENT SIGNED. PT LEFT WITH FAMILY VIA WHEELCHAIR. FAMILY 
WITHOUT CONCERN OR COMPLAINT AT D/C.

## 2018-04-13 ENCOUNTER — HOSPITAL ENCOUNTER (INPATIENT)
Dept: HOSPITAL 91 - PP2 | Age: 83
LOS: 5 days | Discharge: HOME HEALTH | DRG: 57 | End: 2018-04-18
Payer: MEDICARE

## 2018-04-13 ENCOUNTER — HOSPITAL ENCOUNTER (INPATIENT)
Age: 83
LOS: 5 days | Discharge: HOME HEALTH | DRG: 57 | End: 2018-04-18

## 2018-04-13 DIAGNOSIS — D64.9: ICD-10-CM

## 2018-04-13 DIAGNOSIS — Z90.49: ICD-10-CM

## 2018-04-13 DIAGNOSIS — Z79.82: ICD-10-CM

## 2018-04-13 DIAGNOSIS — E78.5: ICD-10-CM

## 2018-04-13 DIAGNOSIS — B37.49: ICD-10-CM

## 2018-04-13 DIAGNOSIS — E11.9: ICD-10-CM

## 2018-04-13 DIAGNOSIS — I10: ICD-10-CM

## 2018-04-13 DIAGNOSIS — F02.80: ICD-10-CM

## 2018-04-13 DIAGNOSIS — M54.30: ICD-10-CM

## 2018-04-13 DIAGNOSIS — G30.9: Primary | ICD-10-CM

## 2018-04-13 DIAGNOSIS — Z79.4: ICD-10-CM

## 2018-04-13 DIAGNOSIS — F05: ICD-10-CM

## 2018-04-13 LAB
ADD MAN DIFF?: NO
ADD UMIC: YES
ALANINE AMINOTRANSFERASE: 24 IU/L (ref 13–69)
ALBUMIN/GLOBULIN RATIO: 0.93
ALBUMIN: 4.4 G/DL (ref 3.3–4.9)
ALKALINE PHOSPHATASE: 90 IU/L (ref 42–121)
ANION GAP: 19 (ref 8–16)
ASPARTATE AMINO TRANSFERASE: 23 IU/L (ref 15–46)
BASOPHIL #: 0.1 10^3/UL (ref 0–0.1)
BASOPHILS %: 0.8 % (ref 0–2)
BILIRUBIN,DIRECT: 0 MG/DL (ref 0–0.2)
BILIRUBIN,TOTAL: 0.3 MG/DL (ref 0.2–1.3)
BLOOD UREA NITROGEN: 21 MG/DL (ref 7–20)
CALCIUM: 9.4 MG/DL (ref 8.4–10.2)
CARBON DIOXIDE: 28 MMOL/L (ref 21–31)
CHLORIDE: 105 MMOL/L (ref 97–110)
CREATININE: 0.84 MG/DL (ref 0.44–1)
EOSINOPHILS #: 0.2 10^3/UL (ref 0–0.5)
EOSINOPHILS %: 2.5 % (ref 0–7)
GLOBULIN: 4.7 G/DL (ref 1.3–3.2)
GLUCOSE: 96 MG/DL (ref 70–220)
HEMATOCRIT: 38 % (ref 37–47)
HEMOGLOBIN: 12.2 G/DL (ref 12–16)
LYMPHOCYTES #: 2.1 10^3/UL (ref 0.8–2.9)
LYMPHOCYTES %: 28.2 % (ref 15–51)
MEAN CORPUSCULAR HEMOGLOBIN: 28.1 PG (ref 29–33)
MEAN CORPUSCULAR HGB CONC: 32.1 G/DL (ref 32–37)
MEAN CORPUSCULAR VOLUME: 87.6 FL (ref 82–101)
MEAN PLATELET VOLUME: 10.2 FL (ref 7.4–10.4)
MONOCYTE #: 0.4 10^3/UL (ref 0.3–0.9)
MONOCYTES %: 4.8 % (ref 0–11)
NEUTROPHIL #: 4.8 10^3/UL (ref 1.6–7.5)
NEUTROPHILS %: 63.3 % (ref 39–77)
NUCLEATED RED BLOOD CELLS #: 0 10^3/UL (ref 0–0)
NUCLEATED RED BLOOD CELLS%: 0 /100WBC (ref 0–0)
PLATELET COUNT: 326 10^3/UL (ref 140–415)
POTASSIUM: 4.1 MMOL/L (ref 3.5–5.1)
RED BLOOD COUNT: 4.34 10^6/UL (ref 4.2–5.4)
RED CELL DISTRIBUTION WIDTH: 13.1 % (ref 11.5–14.5)
SODIUM: 148 MMOL/L (ref 135–144)
TOTAL PROTEIN: 9.1 G/DL (ref 6.1–8.1)
UR ASCORBIC ACID: NEGATIVE MG/DL
UR BILIRUBIN (DIP): NEGATIVE MG/DL
UR BLOOD (DIP): (no result) MG/DL
UR CLARITY: (no result)
UR COLOR: YELLOW
UR GLUCOSE (DIP): NEGATIVE MG/DL
UR KETONES (DIP): NEGATIVE MG/DL
UR LEUKOCYTE ESTERASE (DIP): (no result) LEU/UL
UR NITRITE (DIP): NEGATIVE MG/DL
UR PH (DIP): 6 (ref 5–9)
UR RBC: 0 /HPF (ref 0–5)
UR SPECIFIC GRAVITY (DIP): 1 (ref 1–1.03)
UR SQUAMOUS EPITHELIAL CELL: (no result) /HPF
UR TOTAL PROTEIN (DIP): NEGATIVE MG/DL
UR UROBILINOGEN (DIP): NEGATIVE MG/DL
UR WBC: 118 /HPF (ref 0–5)
WHITE BLOOD COUNT: 7.5 10^3/UL (ref 4.8–10.8)

## 2018-04-13 PROCEDURE — 97162 PT EVAL MOD COMPLEX 30 MIN: CPT

## 2018-04-13 PROCEDURE — 83735 ASSAY OF MAGNESIUM: CPT

## 2018-04-13 PROCEDURE — 97530 THERAPEUTIC ACTIVITIES: CPT

## 2018-04-13 PROCEDURE — 97116 GAIT TRAINING THERAPY: CPT

## 2018-04-13 PROCEDURE — 84100 ASSAY OF PHOSPHORUS: CPT

## 2018-04-13 PROCEDURE — 71045 X-RAY EXAM CHEST 1 VIEW: CPT

## 2018-04-13 PROCEDURE — 93306 TTE W/DOPPLER COMPLETE: CPT

## 2018-04-13 PROCEDURE — 84443 ASSAY THYROID STIM HORMONE: CPT

## 2018-04-13 PROCEDURE — 36415 COLL VENOUS BLD VENIPUNCTURE: CPT

## 2018-04-13 PROCEDURE — 92610 EVALUATE SWALLOWING FUNCTION: CPT

## 2018-04-13 PROCEDURE — 85025 COMPLETE CBC W/AUTO DIFF WBC: CPT

## 2018-04-13 PROCEDURE — 87040 BLOOD CULTURE FOR BACTERIA: CPT

## 2018-04-13 PROCEDURE — 87086 URINE CULTURE/COLONY COUNT: CPT

## 2018-04-13 PROCEDURE — 80053 COMPREHEN METABOLIC PANEL: CPT

## 2018-04-13 PROCEDURE — 83036 HEMOGLOBIN GLYCOSYLATED A1C: CPT

## 2018-04-13 PROCEDURE — 85610 PROTHROMBIN TIME: CPT

## 2018-04-13 PROCEDURE — 99285 EMERGENCY DEPT VISIT HI MDM: CPT

## 2018-04-13 PROCEDURE — 96374 THER/PROPH/DIAG INJ IV PUSH: CPT

## 2018-04-13 PROCEDURE — 81001 URINALYSIS AUTO W/SCOPE: CPT

## 2018-04-13 PROCEDURE — 82962 GLUCOSE BLOOD TEST: CPT

## 2018-04-13 PROCEDURE — 96375 TX/PRO/DX INJ NEW DRUG ADDON: CPT

## 2018-04-13 PROCEDURE — 74176 CT ABD & PELVIS W/O CONTRAST: CPT

## 2018-04-13 RX ADMIN — DEXTROSE 1 GM: 15 GEL ORAL at 22:00

## 2018-04-13 RX ADMIN — PIPERACILLIN SODIUM AND TAZOBACTAM SODIUM 1 MLS/HR: 3; .375 INJECTION, POWDER, LYOPHILIZED, FOR SOLUTION INTRAVENOUS at 17:04

## 2018-04-13 RX ADMIN — VANCOMYCIN HYDROCHLORIDE 1 MLS/HR: 1 INJECTION, POWDER, LYOPHILIZED, FOR SOLUTION INTRAVENOUS at 18:02

## 2018-04-13 RX ADMIN — THIAMINE HYDROCHLORIDE 1 MLS/HR: 100 INJECTION, SOLUTION INTRAMUSCULAR; INTRAVENOUS at 17:00

## 2018-04-14 LAB
ADD MAN DIFF?: NO
BASOPHIL #: 0 10^3/UL (ref 0–0.1)
BASOPHILS %: 0.6 % (ref 0–2)
EOSINOPHILS #: 0.2 10^3/UL (ref 0–0.5)
EOSINOPHILS %: 2.6 % (ref 0–7)
HEMATOCRIT: 33.6 % (ref 37–47)
HEMOGLOBIN A1C: 8.6 % (ref 0–5.9)
HEMOGLOBIN: 10.8 G/DL (ref 12–16)
LYMPHOCYTES #: 1.6 10^3/UL (ref 0.8–2.9)
LYMPHOCYTES %: 22.7 % (ref 15–51)
MAGNESIUM: 1.5 MG/DL (ref 1.7–2.5)
MEAN CORPUSCULAR HEMOGLOBIN: 28.1 PG (ref 29–33)
MEAN CORPUSCULAR HGB CONC: 32.1 G/DL (ref 32–37)
MEAN CORPUSCULAR VOLUME: 87.3 FL (ref 82–101)
MEAN PLATELET VOLUME: 10.9 FL (ref 7.4–10.4)
MONOCYTE #: 0.5 10^3/UL (ref 0.3–0.9)
MONOCYTES %: 7.3 % (ref 0–11)
NEUTROPHIL #: 4.6 10^3/UL (ref 1.6–7.5)
NEUTROPHILS %: 66.4 % (ref 39–77)
NUCLEATED RED BLOOD CELLS #: 0 10^3/UL (ref 0–0)
NUCLEATED RED BLOOD CELLS%: 0 /100WBC (ref 0–0)
PHOSPHORUS: 4 MG/DL (ref 2.5–4.9)
PLATELET COUNT: 281 10^3/UL (ref 140–415)
RED BLOOD COUNT: 3.85 10^6/UL (ref 4.2–5.4)
RED CELL DISTRIBUTION WIDTH: 13.2 % (ref 11.5–14.5)
WHITE BLOOD COUNT: 6.9 10^3/UL (ref 4.8–10.8)

## 2018-04-14 RX ADMIN — MEROPENEM 1 MLS/HR: 500 INJECTION INTRAVENOUS at 00:17

## 2018-04-14 RX ADMIN — LORAZEPAM 1 MG: 2 INJECTION, SOLUTION INTRAMUSCULAR; INTRAVENOUS at 00:16

## 2018-04-14 RX ADMIN — MAGNESIUM SULFATE HEPTAHYDRATE 1 MLS/HR: 500 INJECTION, SOLUTION INTRAMUSCULAR; INTRAVENOUS at 11:06

## 2018-04-14 RX ADMIN — INSULIN GLARGINE 1 UNIT: 100 INJECTION, SOLUTION SUBCUTANEOUS at 22:06

## 2018-04-14 RX ADMIN — ATORVASTATIN CALCIUM 1 MG: 20 TABLET, FILM COATED ORAL at 22:06

## 2018-04-14 RX ADMIN — ASCORBIC ACID, VITAMIN A PALMITATE, CHOLECALCIFEROL, THIAMINE HYDROCHLORIDE, RIBOFLAVIN-5 PHOSPHATE SODIUM, PYRIDOXINE HYDROCHLORIDE, NIACINAMIDE, DEXPANTHENOL, ALPHA-TOCOPHEROL ACETATE, VITAMIN K1, FOLIC ACID, BIOTIN, CYANOCOBALAMIN 1 MLS/HR: 200; 3300; 200; 6; 3.6; 6; 40; 15; 10; 150; 600; 60; 5 INJECTION, SOLUTION INTRAVENOUS at 16:35

## 2018-04-14 RX ADMIN — MEROPENEM 1 MLS/HR: 500 INJECTION INTRAVENOUS at 22:05

## 2018-04-14 RX ADMIN — MEROPENEM 1 MLS/HR: 500 INJECTION INTRAVENOUS at 08:00

## 2018-04-14 RX ADMIN — DEXTROSE MONOHYDRATE 1 ML: 500 INJECTION PARENTERAL at 00:34

## 2018-04-14 RX ADMIN — ENOXAPARIN SODIUM 1 MG: 100 INJECTION SUBCUTANEOUS at 11:04

## 2018-04-14 RX ADMIN — INSULIN ASPART 1 UNIT: 100 INJECTION, SOLUTION INTRAVENOUS; SUBCUTANEOUS at 22:07

## 2018-04-14 RX ADMIN — ASCORBIC ACID, VITAMIN A PALMITATE, CHOLECALCIFEROL, THIAMINE HYDROCHLORIDE, RIBOFLAVIN-5 PHOSPHATE SODIUM, PYRIDOXINE HYDROCHLORIDE, NIACINAMIDE, DEXPANTHENOL, ALPHA-TOCOPHEROL ACETATE, VITAMIN K1, FOLIC ACID, BIOTIN, CYANOCOBALAMIN 1 MLS/HR: 200; 3300; 200; 6; 3.6; 6; 40; 15; 10; 150; 600; 60; 5 INJECTION, SOLUTION INTRAVENOUS at 00:17

## 2018-04-15 LAB
ADD MAN DIFF?: NO
ALANINE AMINOTRANSFERASE: 22 IU/L (ref 13–69)
ALBUMIN/GLOBULIN RATIO: 0.89
ALBUMIN: 3.4 G/DL (ref 3.3–4.9)
ALKALINE PHOSPHATASE: 76 IU/L (ref 42–121)
ANION GAP: 16 (ref 8–16)
ASPARTATE AMINO TRANSFERASE: 23 IU/L (ref 15–46)
BASOPHIL #: 0 10^3/UL (ref 0–0.1)
BASOPHILS %: 0.7 % (ref 0–2)
BILIRUBIN,DIRECT: 0 MG/DL (ref 0–0.2)
BILIRUBIN,TOTAL: 0.5 MG/DL (ref 0.2–1.3)
BLOOD UREA NITROGEN: 12 MG/DL (ref 7–20)
CALCIUM: 8.7 MG/DL (ref 8.4–10.2)
CARBON DIOXIDE: 25 MMOL/L (ref 21–31)
CHLORIDE: 109 MMOL/L (ref 97–110)
CREATININE: 0.71 MG/DL (ref 0.44–1)
EOSINOPHILS #: 0.2 10^3/UL (ref 0–0.5)
EOSINOPHILS %: 2.6 % (ref 0–7)
GLOBULIN: 3.8 G/DL (ref 1.3–3.2)
GLUCOSE: 114 MG/DL (ref 70–220)
HEMATOCRIT: 33.7 % (ref 37–47)
HEMOGLOBIN: 11 G/DL (ref 12–16)
INR: 1.05
LYMPHOCYTES #: 1.6 10^3/UL (ref 0.8–2.9)
LYMPHOCYTES %: 26.7 % (ref 15–51)
MAGNESIUM: 2 MG/DL (ref 1.7–2.5)
MEAN CORPUSCULAR HEMOGLOBIN: 28.5 PG (ref 29–33)
MEAN CORPUSCULAR HGB CONC: 32.6 G/DL (ref 32–37)
MEAN CORPUSCULAR VOLUME: 87.3 FL (ref 82–101)
MEAN PLATELET VOLUME: 10.5 FL (ref 7.4–10.4)
MONOCYTE #: 0.4 10^3/UL (ref 0.3–0.9)
MONOCYTES %: 7.2 % (ref 0–11)
NEUTROPHIL #: 3.7 10^3/UL (ref 1.6–7.5)
NEUTROPHILS %: 62.3 % (ref 39–77)
NUCLEATED RED BLOOD CELLS #: 0 10^3/UL (ref 0–0)
NUCLEATED RED BLOOD CELLS%: 0 /100WBC (ref 0–0)
PHOSPHORUS: 3.4 MG/DL (ref 2.5–4.9)
PLATELET COUNT: 266 10^3/UL (ref 140–415)
POTASSIUM: 3.9 MMOL/L (ref 3.5–5.1)
PROTIME: 13.8 SEC (ref 11.9–14.9)
PT RATIO: 1.1
RED BLOOD COUNT: 3.86 10^6/UL (ref 4.2–5.4)
RED CELL DISTRIBUTION WIDTH: 13.2 % (ref 11.5–14.5)
SODIUM: 146 MMOL/L (ref 135–144)
THYROID STIMULATING HORMONE: 3.12 MIU/L (ref 0.47–4.68)
TOTAL PROTEIN: 7.2 G/DL (ref 6.1–8.1)
WHITE BLOOD COUNT: 5.9 10^3/UL (ref 4.8–10.8)

## 2018-04-15 RX ADMIN — LORAZEPAM 1 MG: 2 INJECTION, SOLUTION INTRAMUSCULAR; INTRAVENOUS at 20:27

## 2018-04-15 RX ADMIN — ASPIRIN 1 MG: 81 TABLET, COATED ORAL at 08:57

## 2018-04-15 RX ADMIN — FLUCONAZOLE 1 MG: 200 TABLET ORAL at 12:21

## 2018-04-15 RX ADMIN — INSULIN ASPART 1 UNIT: 100 INJECTION, SOLUTION INTRAVENOUS; SUBCUTANEOUS at 08:00

## 2018-04-15 RX ADMIN — MEROPENEM 1 MLS/HR: 500 INJECTION INTRAVENOUS at 08:12

## 2018-04-15 RX ADMIN — Medication 1 CAP: at 20:26

## 2018-04-15 RX ADMIN — LOSARTAN POTASSIUM 1 MG: 50 TABLET ORAL at 08:57

## 2018-04-15 RX ADMIN — METOPROLOL SUCCINATE 1 MG: 50 TABLET, EXTENDED RELEASE ORAL at 08:57

## 2018-04-15 RX ADMIN — INSULIN GLARGINE 1 UNIT: 100 INJECTION, SOLUTION SUBCUTANEOUS at 20:24

## 2018-04-15 RX ADMIN — Medication 1 CAP: at 12:20

## 2018-04-15 RX ADMIN — BARIUM SULFATE 1 ML: 21 SUSPENSION ORAL at 15:53

## 2018-04-15 RX ADMIN — MEROPENEM 1 MLS/HR: 500 INJECTION INTRAVENOUS at 20:21

## 2018-04-15 RX ADMIN — ATORVASTATIN CALCIUM 1 MG: 20 TABLET, FILM COATED ORAL at 20:26

## 2018-04-15 RX ADMIN — INSULIN GLARGINE 1 UNIT: 100 INJECTION, SOLUTION SUBCUTANEOUS at 08:56

## 2018-04-15 RX ADMIN — INSULIN ASPART 1 UNIT: 100 INJECTION, SOLUTION INTRAVENOUS; SUBCUTANEOUS at 20:25

## 2018-04-15 RX ADMIN — INSULIN ASPART 1 UNIT: 100 INJECTION, SOLUTION INTRAVENOUS; SUBCUTANEOUS at 17:34

## 2018-04-15 RX ADMIN — INSULIN ASPART 1 UNIT: 100 INJECTION, SOLUTION INTRAVENOUS; SUBCUTANEOUS at 11:57

## 2018-04-15 RX ADMIN — AMLODIPINE BESYLATE 1 MG: 10 TABLET ORAL at 08:57

## 2018-04-15 RX ADMIN — ENOXAPARIN SODIUM 1 MG: 100 INJECTION SUBCUTANEOUS at 08:28

## 2018-04-16 RX ADMIN — FLUCONAZOLE 1 MG: 200 TABLET ORAL at 09:04

## 2018-04-16 RX ADMIN — QUETIAPINE FUMARATE 1 MG: 25 TABLET ORAL at 22:26

## 2018-04-16 RX ADMIN — Medication 1 CAP: at 09:04

## 2018-04-16 RX ADMIN — INSULIN GLARGINE 1 UNIT: 100 INJECTION, SOLUTION SUBCUTANEOUS at 22:29

## 2018-04-16 RX ADMIN — INSULIN ASPART 1 UNIT: 100 INJECTION, SOLUTION INTRAVENOUS; SUBCUTANEOUS at 18:05

## 2018-04-16 RX ADMIN — AMLODIPINE BESYLATE 1 MG: 10 TABLET ORAL at 09:04

## 2018-04-16 RX ADMIN — ASPIRIN 1 MG: 81 TABLET, COATED ORAL at 09:04

## 2018-04-16 RX ADMIN — INSULIN GLARGINE 1 UNIT: 100 INJECTION, SOLUTION SUBCUTANEOUS at 09:07

## 2018-04-16 RX ADMIN — MEROPENEM 1 MLS/HR: 500 INJECTION INTRAVENOUS at 09:04

## 2018-04-16 RX ADMIN — ATORVASTATIN CALCIUM 1 MG: 20 TABLET, FILM COATED ORAL at 22:26

## 2018-04-16 RX ADMIN — INSULIN ASPART 1 UNIT: 100 INJECTION, SOLUTION INTRAVENOUS; SUBCUTANEOUS at 22:30

## 2018-04-16 RX ADMIN — ENOXAPARIN SODIUM 1 MG: 100 INJECTION SUBCUTANEOUS at 09:07

## 2018-04-16 RX ADMIN — METOPROLOL SUCCINATE 1 MG: 50 TABLET, EXTENDED RELEASE ORAL at 09:05

## 2018-04-16 RX ADMIN — INSULIN ASPART 1 UNIT: 100 INJECTION, SOLUTION INTRAVENOUS; SUBCUTANEOUS at 08:00

## 2018-04-16 RX ADMIN — INSULIN ASPART 1 UNIT: 100 INJECTION, SOLUTION INTRAVENOUS; SUBCUTANEOUS at 12:48

## 2018-04-16 RX ADMIN — LOSARTAN POTASSIUM 1 MG: 50 TABLET ORAL at 09:04

## 2018-04-16 RX ADMIN — Medication 1 CAP: at 22:26

## 2018-04-16 RX ADMIN — LEVOFLOXACIN 1 MG: 250 TABLET, FILM COATED ORAL at 05:22

## 2018-04-16 RX ADMIN — MEROPENEM 1 MLS/HR: 500 INJECTION INTRAVENOUS at 22:26

## 2018-04-17 RX ADMIN — FLUCONAZOLE 1 MG: 200 TABLET ORAL at 09:22

## 2018-04-17 RX ADMIN — INSULIN GLARGINE 1 UNIT: 100 INJECTION, SOLUTION SUBCUTANEOUS at 09:26

## 2018-04-17 RX ADMIN — INSULIN ASPART 1 UNIT: 100 INJECTION, SOLUTION INTRAVENOUS; SUBCUTANEOUS at 18:24

## 2018-04-17 RX ADMIN — ENOXAPARIN SODIUM 1 MG: 100 INJECTION SUBCUTANEOUS at 09:25

## 2018-04-17 RX ADMIN — MEROPENEM 1 MLS/HR: 500 INJECTION INTRAVENOUS at 09:22

## 2018-04-17 RX ADMIN — INFLUENZA A VIRUS A/MICHIGAN/45/2015 X-275 (H1N1) ANTIGEN (FORMALDEHYDE INACTIVATED), INFLUENZA A VIRUS A/HONG KONG/4801/2014 X-263B (H3N2) ANTIGEN (FORMALDEHYDE INACTIVATED), INFLUENZA B VIRUS B/PHUKET/3073/2013 ANTIGEN (FORMALDEHYDE INACTIVATED), AND INFLUENZA B VIRUS B/BRISBANE/60/2008 ANTIGEN (FORMALDEHYDE INACTIVATED) 1 ML: 15; 15; 15; 15 INJECTION, SUSPENSION INTRAMUSCULAR at 09:00

## 2018-04-17 RX ADMIN — ASPIRIN 1 MG: 81 TABLET, COATED ORAL at 09:23

## 2018-04-17 RX ADMIN — METOPROLOL SUCCINATE 1 MG: 50 TABLET, EXTENDED RELEASE ORAL at 09:23

## 2018-04-17 RX ADMIN — Medication 1 CAP: at 09:23

## 2018-04-17 RX ADMIN — INSULIN GLARGINE 1 UNIT: 100 INJECTION, SOLUTION SUBCUTANEOUS at 21:58

## 2018-04-17 RX ADMIN — INSULIN ASPART 1 UNIT: 100 INJECTION, SOLUTION INTRAVENOUS; SUBCUTANEOUS at 21:59

## 2018-04-17 RX ADMIN — LOSARTAN POTASSIUM 1 MG: 50 TABLET ORAL at 09:22

## 2018-04-17 RX ADMIN — INSULIN ASPART 1 UNIT: 100 INJECTION, SOLUTION INTRAVENOUS; SUBCUTANEOUS at 12:17

## 2018-04-17 RX ADMIN — ATORVASTATIN CALCIUM 1 MG: 20 TABLET, FILM COATED ORAL at 22:00

## 2018-04-17 RX ADMIN — Medication 1 CAP: at 22:00

## 2018-04-17 RX ADMIN — LORAZEPAM 1 MG: 2 INJECTION, SOLUTION INTRAMUSCULAR; INTRAVENOUS at 01:36

## 2018-04-17 RX ADMIN — QUETIAPINE FUMARATE 1 MG: 25 TABLET ORAL at 22:00

## 2018-04-17 RX ADMIN — MEROPENEM 1 MLS/HR: 500 INJECTION INTRAVENOUS at 22:01

## 2018-04-17 RX ADMIN — INSULIN ASPART 1 UNIT: 100 INJECTION, SOLUTION INTRAVENOUS; SUBCUTANEOUS at 09:26

## 2018-04-17 RX ADMIN — AMLODIPINE BESYLATE 1 MG: 10 TABLET ORAL at 09:23

## 2018-04-18 RX ADMIN — MEROPENEM 1 MLS/HR: 500 INJECTION INTRAVENOUS at 08:44

## 2018-04-18 RX ADMIN — AMLODIPINE BESYLATE 1 MG: 10 TABLET ORAL at 08:45

## 2018-04-18 RX ADMIN — INSULIN ASPART 1 UNIT: 100 INJECTION, SOLUTION INTRAVENOUS; SUBCUTANEOUS at 12:17

## 2018-04-18 RX ADMIN — ASPIRIN 1 MG: 81 TABLET, COATED ORAL at 08:45

## 2018-04-18 RX ADMIN — ENOXAPARIN SODIUM 1 MG: 100 INJECTION SUBCUTANEOUS at 08:43

## 2018-04-18 RX ADMIN — LORAZEPAM 1 MG: 2 INJECTION, SOLUTION INTRAMUSCULAR; INTRAVENOUS at 03:39

## 2018-04-18 RX ADMIN — Medication 1 CAP: at 08:45

## 2018-04-18 RX ADMIN — METOPROLOL SUCCINATE 1 MG: 50 TABLET, EXTENDED RELEASE ORAL at 08:46

## 2018-04-18 RX ADMIN — LOSARTAN POTASSIUM 1 MG: 50 TABLET ORAL at 08:46

## 2018-04-18 RX ADMIN — INSULIN ASPART 1 UNIT: 100 INJECTION, SOLUTION INTRAVENOUS; SUBCUTANEOUS at 08:42

## 2018-04-18 RX ADMIN — INSULIN GLARGINE 1 UNIT: 100 INJECTION, SOLUTION SUBCUTANEOUS at 08:43

## 2018-04-18 RX ADMIN — INSULIN ASPART 1 UNIT: 100 INJECTION, SOLUTION INTRAVENOUS; SUBCUTANEOUS at 17:30

## 2018-05-03 ENCOUNTER — HOSPITAL ENCOUNTER (INPATIENT)
Dept: HOSPITAL 91 - MS2 | Age: 83
LOS: 9 days | Discharge: HOME | DRG: 871 | End: 2018-05-12
Payer: MEDICARE

## 2018-05-03 ENCOUNTER — HOSPITAL ENCOUNTER (INPATIENT)
Age: 83
LOS: 9 days | Discharge: HOME | DRG: 871 | End: 2018-05-12

## 2018-05-03 DIAGNOSIS — Z90.49: ICD-10-CM

## 2018-05-03 DIAGNOSIS — E11.65: ICD-10-CM

## 2018-05-03 DIAGNOSIS — E78.5: ICD-10-CM

## 2018-05-03 DIAGNOSIS — F05: ICD-10-CM

## 2018-05-03 DIAGNOSIS — N39.0: ICD-10-CM

## 2018-05-03 DIAGNOSIS — Z79.4: ICD-10-CM

## 2018-05-03 DIAGNOSIS — Z79.82: ICD-10-CM

## 2018-05-03 DIAGNOSIS — B96.20: ICD-10-CM

## 2018-05-03 DIAGNOSIS — I10: ICD-10-CM

## 2018-05-03 DIAGNOSIS — D64.9: ICD-10-CM

## 2018-05-03 DIAGNOSIS — E87.5: ICD-10-CM

## 2018-05-03 DIAGNOSIS — N17.9: ICD-10-CM

## 2018-05-03 DIAGNOSIS — L89.153: ICD-10-CM

## 2018-05-03 DIAGNOSIS — R65.20: ICD-10-CM

## 2018-05-03 DIAGNOSIS — R62.7: ICD-10-CM

## 2018-05-03 DIAGNOSIS — F03.90: ICD-10-CM

## 2018-05-03 DIAGNOSIS — A41.9: Primary | ICD-10-CM

## 2018-05-03 DIAGNOSIS — E86.0: ICD-10-CM

## 2018-05-03 LAB
ADD MAN DIFF?: NO
ADD UMIC: YES
ALANINE AMINOTRANSFERASE: 17 IU/L (ref 13–69)
ALBUMIN/GLOBULIN RATIO: 1.05
ALBUMIN: 4 G/DL (ref 3.3–4.9)
ALKALINE PHOSPHATASE: 80 IU/L (ref 42–121)
ANION GAP: 21 (ref 8–16)
ASPARTATE AMINO TRANSFERASE: 15 IU/L (ref 15–46)
BASOPHIL #: 0 10^3/UL (ref 0–0.1)
BASOPHILS %: 0.4 % (ref 0–2)
BILIRUBIN,DIRECT: 0 MG/DL (ref 0–0.2)
BILIRUBIN,TOTAL: 0.1 MG/DL (ref 0.2–1.3)
BLOOD UREA NITROGEN: 45 MG/DL (ref 7–20)
CALCIUM: 9.3 MG/DL (ref 8.4–10.2)
CARBON DIOXIDE: 22 MMOL/L (ref 21–31)
CHLORIDE: 104 MMOL/L (ref 97–110)
CREATININE: 1.21 MG/DL (ref 0.44–1)
EOSINOPHILS #: 0.1 10^3/UL (ref 0–0.5)
EOSINOPHILS %: 1 % (ref 0–7)
GLOBULIN: 3.8 G/DL (ref 1.3–3.2)
GLUCOSE: 319 MG/DL (ref 70–220)
HEMATOCRIT: 36.3 % (ref 37–47)
HEMOGLOBIN: 11.8 G/DL (ref 12–16)
INR: 1
LACTIC ACID: 2.6 MMOL/L (ref 0.5–2)
LACTIC ACID: 2.6 MMOL/L (ref 0.5–2)
LACTIC ACID: 3.2 MMOL/L (ref 0.5–2)
LYMPHOCYTES #: 1.5 10^3/UL (ref 0.8–2.9)
LYMPHOCYTES %: 21.6 % (ref 15–51)
MEAN CORPUSCULAR HEMOGLOBIN: 28.6 PG (ref 29–33)
MEAN CORPUSCULAR HGB CONC: 32.5 G/DL (ref 32–37)
MEAN CORPUSCULAR VOLUME: 87.9 FL (ref 82–101)
MEAN PLATELET VOLUME: 10.9 FL (ref 7.4–10.4)
MONOCYTE #: 0.7 10^3/UL (ref 0.3–0.9)
MONOCYTES %: 9.7 % (ref 0–11)
NEUTROPHIL #: 4.8 10^3/UL (ref 1.6–7.5)
NEUTROPHILS %: 67.2 % (ref 39–77)
NUCLEATED RED BLOOD CELLS #: 0 10^3/UL (ref 0–0)
NUCLEATED RED BLOOD CELLS%: 0 /100WBC (ref 0–0)
PARTIAL THROMBOPLASTIN TIME: 35.6 SEC (ref 25–35)
PLATELET COUNT: 360 10^3/UL (ref 140–415)
POTASSIUM: 5.9 MMOL/L (ref 3.5–5.1)
PROTIME: 13.3 SEC (ref 11.9–14.9)
PT RATIO: 1
RED BLOOD COUNT: 4.13 10^6/UL (ref 4.2–5.4)
RED CELL DISTRIBUTION WIDTH: 13.7 % (ref 11.5–14.5)
SODIUM: 141 MMOL/L (ref 135–144)
TOTAL PROTEIN: 7.8 G/DL (ref 6.1–8.1)
TROPONIN-I: < 0.012 NG/ML (ref 0–0.12)
UR ASCORBIC ACID: NEGATIVE MG/DL
UR BACTERIA: (no result) /HPF
UR BILIRUBIN (DIP): NEGATIVE MG/DL
UR BLOOD (DIP): (no result) MG/DL
UR CLARITY: (no result)
UR COLOR: (no result)
UR GLUCOSE (DIP): NEGATIVE MG/DL
UR KETONES (DIP): NEGATIVE MG/DL
UR LEUKOCYTE ESTERASE (DIP): (no result) LEU/UL
UR NITRITE (DIP): NEGATIVE MG/DL
UR PH (DIP): 6 (ref 5–9)
UR RBC: 0 /HPF (ref 0–5)
UR SPECIFIC GRAVITY (DIP): 1.01 (ref 1–1.03)
UR SQUAMOUS EPITHELIAL CELL: (no result) /HPF
UR TOTAL PROTEIN (DIP): (no result) MG/DL
UR UROBILINOGEN (DIP): NEGATIVE MG/DL
UR WBC: > 182 /HPF (ref 0–5)
WHITE BLOOD COUNT: 7.1 10^3/UL (ref 4.8–10.8)

## 2018-05-03 PROCEDURE — 96375 TX/PRO/DX INJ NEW DRUG ADDON: CPT

## 2018-05-03 PROCEDURE — 85025 COMPLETE CBC W/AUTO DIFF WBC: CPT

## 2018-05-03 PROCEDURE — 71045 X-RAY EXAM CHEST 1 VIEW: CPT

## 2018-05-03 PROCEDURE — 97530 THERAPEUTIC ACTIVITIES: CPT

## 2018-05-03 PROCEDURE — 80048 BASIC METABOLIC PNL TOTAL CA: CPT

## 2018-05-03 PROCEDURE — 84484 ASSAY OF TROPONIN QUANT: CPT

## 2018-05-03 PROCEDURE — 97116 GAIT TRAINING THERAPY: CPT

## 2018-05-03 PROCEDURE — 99291 CRITICAL CARE FIRST HOUR: CPT

## 2018-05-03 PROCEDURE — 82962 GLUCOSE BLOOD TEST: CPT

## 2018-05-03 PROCEDURE — 83735 ASSAY OF MAGNESIUM: CPT

## 2018-05-03 PROCEDURE — 93005 ELECTROCARDIOGRAM TRACING: CPT

## 2018-05-03 PROCEDURE — 83605 ASSAY OF LACTIC ACID: CPT

## 2018-05-03 PROCEDURE — 80069 RENAL FUNCTION PANEL: CPT

## 2018-05-03 PROCEDURE — 85610 PROTHROMBIN TIME: CPT

## 2018-05-03 PROCEDURE — 96372 THER/PROPH/DIAG INJ SC/IM: CPT

## 2018-05-03 PROCEDURE — 85730 THROMBOPLASTIN TIME PARTIAL: CPT

## 2018-05-03 PROCEDURE — 87086 URINE CULTURE/COLONY COUNT: CPT

## 2018-05-03 PROCEDURE — 81001 URINALYSIS AUTO W/SCOPE: CPT

## 2018-05-03 PROCEDURE — 84443 ASSAY THYROID STIM HORMONE: CPT

## 2018-05-03 PROCEDURE — 96365 THER/PROPH/DIAG IV INF INIT: CPT

## 2018-05-03 PROCEDURE — 84100 ASSAY OF PHOSPHORUS: CPT

## 2018-05-03 PROCEDURE — 80053 COMPREHEN METABOLIC PANEL: CPT

## 2018-05-03 PROCEDURE — 87040 BLOOD CULTURE FOR BACTERIA: CPT

## 2018-05-03 PROCEDURE — 97162 PT EVAL MOD COMPLEX 30 MIN: CPT

## 2018-05-03 PROCEDURE — 36415 COLL VENOUS BLD VENIPUNCTURE: CPT

## 2018-05-03 RX ADMIN — THIAMINE HYDROCHLORIDE 1 MLS/HR: 100 INJECTION, SOLUTION INTRAMUSCULAR; INTRAVENOUS at 23:15

## 2018-05-03 RX ADMIN — THIAMINE HYDROCHLORIDE 1 ML: 100 INJECTION, SOLUTION INTRAMUSCULAR; INTRAVENOUS at 17:36

## 2018-05-03 RX ADMIN — INSULIN HUMAN 1 UNIT: 100 INJECTION, SOLUTION PARENTERAL at 20:29

## 2018-05-03 RX ADMIN — FUROSEMIDE 1 MG: 10 INJECTION, SOLUTION INTRAVENOUS at 20:14

## 2018-05-03 RX ADMIN — DEXTROSE MONOHYDRATE 1 ML: 500 INJECTION PARENTERAL at 20:33

## 2018-05-03 RX ADMIN — PIPERACILLIN SODIUM AND TAZOBACTAM SODIUM 1 MLS/HR: 3; .375 INJECTION, POWDER, LYOPHILIZED, FOR SOLUTION INTRAVENOUS at 23:14

## 2018-05-03 RX ADMIN — HALOPERIDOL LACTATE 1 MG: 5 INJECTION, SOLUTION INTRAMUSCULAR at 18:52

## 2018-05-03 RX ADMIN — CEFEPIME HYDROCHLORIDE 1 MLS/HR: 2 INJECTION, POWDER, FOR SOLUTION INTRAVENOUS at 18:07

## 2018-05-04 LAB
ADD MAN DIFF?: NO
ALANINE AMINOTRANSFERASE: 17 IU/L (ref 13–69)
ALANINE AMINOTRANSFERASE: 20 IU/L (ref 13–69)
ALBUMIN/GLOBULIN RATIO: 0.92
ALBUMIN/GLOBULIN RATIO: 1
ALBUMIN: 3.4 G/DL (ref 3.3–4.9)
ALBUMIN: 3.6 G/DL (ref 3.3–4.9)
ALKALINE PHOSPHATASE: 64 IU/L (ref 42–121)
ALKALINE PHOSPHATASE: 72 IU/L (ref 42–121)
ANION GAP: 16 (ref 8–16)
ANION GAP: 17 (ref 8–16)
ASPARTATE AMINO TRANSFERASE: 16 IU/L (ref 15–46)
ASPARTATE AMINO TRANSFERASE: 29 IU/L (ref 15–46)
BASOPHIL #: 0 10^3/UL (ref 0–0.1)
BASOPHILS %: 0.7 % (ref 0–2)
BILIRUBIN,DIRECT: 0 MG/DL (ref 0–0.2)
BILIRUBIN,DIRECT: 0 MG/DL (ref 0–0.2)
BILIRUBIN,TOTAL: 0.3 MG/DL (ref 0.2–1.3)
BILIRUBIN,TOTAL: 0.3 MG/DL (ref 0.2–1.3)
BLOOD UREA NITROGEN: 29 MG/DL (ref 7–20)
BLOOD UREA NITROGEN: 38 MG/DL (ref 7–20)
CALCIUM: 8.9 MG/DL (ref 8.4–10.2)
CALCIUM: 9.1 MG/DL (ref 8.4–10.2)
CARBON DIOXIDE: 21 MMOL/L (ref 21–31)
CARBON DIOXIDE: 23 MMOL/L (ref 21–31)
CHLORIDE: 109 MMOL/L (ref 97–110)
CHLORIDE: 112 MMOL/L (ref 97–110)
CREATININE: 0.93 MG/DL (ref 0.44–1)
CREATININE: 0.98 MG/DL (ref 0.44–1)
EOSINOPHILS #: 0.2 10^3/UL (ref 0–0.5)
EOSINOPHILS %: 3.5 % (ref 0–7)
GLOBULIN: 3.4 G/DL (ref 1.3–3.2)
GLOBULIN: 3.9 G/DL (ref 1.3–3.2)
GLUCOSE: 105 MG/DL (ref 70–220)
GLUCOSE: 129 MG/DL (ref 70–220)
HEMATOCRIT: 32.8 % (ref 37–47)
HEMOGLOBIN: 10.8 G/DL (ref 12–16)
LACTIC ACID: 1.3 MMOL/L (ref 0.5–2)
LACTIC ACID: 2.2 MMOL/L (ref 0.5–2)
LYMPHOCYTES #: 1.5 10^3/UL (ref 0.8–2.9)
LYMPHOCYTES %: 24.6 % (ref 15–51)
MEAN CORPUSCULAR HEMOGLOBIN: 28.7 PG (ref 29–33)
MEAN CORPUSCULAR HGB CONC: 32.9 G/DL (ref 32–37)
MEAN CORPUSCULAR VOLUME: 87.2 FL (ref 82–101)
MEAN PLATELET VOLUME: 10.9 FL (ref 7.4–10.4)
MONOCYTE #: 0.7 10^3/UL (ref 0.3–0.9)
MONOCYTES %: 11 % (ref 0–11)
NEUTROPHIL #: 3.6 10^3/UL (ref 1.6–7.5)
NEUTROPHILS %: 59.9 % (ref 39–77)
NUCLEATED RED BLOOD CELLS #: 0 10^3/UL (ref 0–0)
NUCLEATED RED BLOOD CELLS%: 0 /100WBC (ref 0–0)
PLATELET COUNT: 295 10^3/UL (ref 140–415)
POTASSIUM: 4.5 MMOL/L (ref 3.5–5.1)
POTASSIUM: 5.1 MMOL/L (ref 3.5–5.1)
RED BLOOD COUNT: 3.76 10^6/UL (ref 4.2–5.4)
RED CELL DISTRIBUTION WIDTH: 13.7 % (ref 11.5–14.5)
SODIUM: 144 MMOL/L (ref 135–144)
SODIUM: 144 MMOL/L (ref 135–144)
TOTAL PROTEIN: 6.8 G/DL (ref 6.1–8.1)
TOTAL PROTEIN: 7.5 G/DL (ref 6.1–8.1)
WHITE BLOOD COUNT: 6 10^3/UL (ref 4.8–10.8)

## 2018-05-04 RX ADMIN — THIAMINE HYDROCHLORIDE 1 MLS/HR: 100 INJECTION, SOLUTION INTRAMUSCULAR; INTRAVENOUS at 12:29

## 2018-05-04 RX ADMIN — INSULIN ASPART 1 UNIT: 100 INJECTION, SOLUTION INTRAVENOUS; SUBCUTANEOUS at 17:36

## 2018-05-04 RX ADMIN — INSULIN ASPART 1 UNIT: 100 INJECTION, SOLUTION INTRAVENOUS; SUBCUTANEOUS at 12:06

## 2018-05-04 RX ADMIN — METOPROLOL SUCCINATE 1 MG: 50 TABLET, EXTENDED RELEASE ORAL at 08:53

## 2018-05-04 RX ADMIN — INSULIN GLARGINE 1 UNIT: 100 INJECTION, SOLUTION SUBCUTANEOUS at 09:04

## 2018-05-04 RX ADMIN — ATORVASTATIN CALCIUM 1 MG: 20 TABLET, FILM COATED ORAL at 20:58

## 2018-05-04 RX ADMIN — INSULIN ASPART 1 UNIT: 100 INJECTION, SOLUTION INTRAVENOUS; SUBCUTANEOUS at 23:09

## 2018-05-04 RX ADMIN — INSULIN GLARGINE 1 UNIT: 100 INJECTION, SOLUTION SUBCUTANEOUS at 21:00

## 2018-05-04 RX ADMIN — PIPERACILLIN SODIUM AND TAZOBACTAM SODIUM 1 MLS/HR: 3; .375 INJECTION, POWDER, LYOPHILIZED, FOR SOLUTION INTRAVENOUS at 13:29

## 2018-05-04 RX ADMIN — OXYBUTYNIN CHLORIDE 1 MG: 5 TABLET ORAL at 08:52

## 2018-05-04 RX ADMIN — ASPIRIN 1 MG: 81 TABLET, COATED ORAL at 08:52

## 2018-05-04 RX ADMIN — INSULIN ASPART 1 UNIT: 100 INJECTION, SOLUTION INTRAVENOUS; SUBCUTANEOUS at 09:04

## 2018-05-04 RX ADMIN — PIPERACILLIN SODIUM AND TAZOBACTAM SODIUM 1 MLS/HR: 3; .375 INJECTION, POWDER, LYOPHILIZED, FOR SOLUTION INTRAVENOUS at 05:48

## 2018-05-04 RX ADMIN — PIPERACILLIN SODIUM AND TAZOBACTAM SODIUM 1 MLS/HR: 3; .375 INJECTION, POWDER, LYOPHILIZED, FOR SOLUTION INTRAVENOUS at 21:03

## 2018-05-05 LAB
ADD MAN DIFF?: NO
ALBUMIN: 3.8 G/DL (ref 3.3–4.9)
ANION GAP: 16 (ref 8–16)
BASOPHIL #: 0.1 10^3/UL (ref 0–0.1)
BASOPHILS %: 0.8 % (ref 0–2)
BLOOD UREA NITROGEN: 19 MG/DL (ref 7–20)
CALCIUM: 8.9 MG/DL (ref 8.4–10.2)
CARBON DIOXIDE: 23 MMOL/L (ref 21–31)
CHLORIDE: 109 MMOL/L (ref 97–110)
CREATININE: 0.87 MG/DL (ref 0.44–1)
EOSINOPHILS #: 0.2 10^3/UL (ref 0–0.5)
EOSINOPHILS %: 2.8 % (ref 0–7)
GLUCOSE: 125 MG/DL (ref 70–220)
HEMATOCRIT: 34.2 % (ref 37–47)
HEMOGLOBIN: 11.3 G/DL (ref 12–16)
LYMPHOCYTES #: 1.9 10^3/UL (ref 0.8–2.9)
LYMPHOCYTES %: 26 % (ref 15–51)
MAGNESIUM: 1.2 MG/DL (ref 1.7–2.5)
MEAN CORPUSCULAR HEMOGLOBIN: 28.4 PG (ref 29–33)
MEAN CORPUSCULAR HGB CONC: 33 G/DL (ref 32–37)
MEAN CORPUSCULAR VOLUME: 85.9 FL (ref 82–101)
MEAN PLATELET VOLUME: 10.7 FL (ref 7.4–10.4)
MONOCYTE #: 0.7 10^3/UL (ref 0.3–0.9)
MONOCYTES %: 9.8 % (ref 0–11)
NEUTROPHIL #: 4.4 10^3/UL (ref 1.6–7.5)
NEUTROPHILS %: 60.2 % (ref 39–77)
NUCLEATED RED BLOOD CELLS #: 0 10^3/UL (ref 0–0)
NUCLEATED RED BLOOD CELLS%: 0 /100WBC (ref 0–0)
PHOSPHORUS: 3.7 MG/DL (ref 2.5–4.9)
PLATELET COUNT: 337 10^3/UL (ref 140–415)
POTASSIUM: 4.1 MMOL/L (ref 3.5–5.1)
RED BLOOD COUNT: 3.98 10^6/UL (ref 4.2–5.4)
RED CELL DISTRIBUTION WIDTH: 13.8 % (ref 11.5–14.5)
SODIUM: 144 MMOL/L (ref 135–144)
WHITE BLOOD COUNT: 7.2 10^3/UL (ref 4.8–10.8)

## 2018-05-05 RX ADMIN — INSULIN ASPART 1 UNIT: 100 INJECTION, SOLUTION INTRAVENOUS; SUBCUTANEOUS at 20:29

## 2018-05-05 RX ADMIN — MAGNESIUM SULFATE HEPTAHYDRATE 1 MLS/HR: 500 INJECTION, SOLUTION INTRAMUSCULAR; INTRAVENOUS at 16:07

## 2018-05-05 RX ADMIN — INSULIN ASPART 1 UNIT: 100 INJECTION, SOLUTION INTRAVENOUS; SUBCUTANEOUS at 12:11

## 2018-05-05 RX ADMIN — OXYBUTYNIN CHLORIDE 1 MG: 5 TABLET ORAL at 08:13

## 2018-05-05 RX ADMIN — DOCUSATE SODIUM 1 MG: 100 CAPSULE, LIQUID FILLED ORAL at 20:25

## 2018-05-05 RX ADMIN — INSULIN GLARGINE 1 UNIT: 100 INJECTION, SOLUTION SUBCUTANEOUS at 20:28

## 2018-05-05 RX ADMIN — THIAMINE HYDROCHLORIDE 1 MLS/HR: 100 INJECTION, SOLUTION INTRAMUSCULAR; INTRAVENOUS at 14:14

## 2018-05-05 RX ADMIN — INSULIN GLARGINE 1 UNIT: 100 INJECTION, SOLUTION SUBCUTANEOUS at 08:13

## 2018-05-05 RX ADMIN — THIAMINE HYDROCHLORIDE 1 MLS/HR: 100 INJECTION, SOLUTION INTRAMUSCULAR; INTRAVENOUS at 03:25

## 2018-05-05 RX ADMIN — ASPIRIN 1 MG: 81 TABLET, COATED ORAL at 08:12

## 2018-05-05 RX ADMIN — COLLAGENASE SANTYL 1 APPLIC: 250 OINTMENT TOPICAL at 08:12

## 2018-05-05 RX ADMIN — PIPERACILLIN SODIUM AND TAZOBACTAM SODIUM 1 MLS/HR: 3; .375 INJECTION, POWDER, LYOPHILIZED, FOR SOLUTION INTRAVENOUS at 15:32

## 2018-05-05 RX ADMIN — PIPERACILLIN SODIUM AND TAZOBACTAM SODIUM 1 MLS/HR: 3; .375 INJECTION, POWDER, LYOPHILIZED, FOR SOLUTION INTRAVENOUS at 20:39

## 2018-05-05 RX ADMIN — ATORVASTATIN CALCIUM 1 MG: 20 TABLET, FILM COATED ORAL at 20:25

## 2018-05-05 RX ADMIN — PIPERACILLIN SODIUM AND TAZOBACTAM SODIUM 1 MLS/HR: 3; .375 INJECTION, POWDER, LYOPHILIZED, FOR SOLUTION INTRAVENOUS at 05:07

## 2018-05-05 RX ADMIN — INSULIN ASPART 1 UNIT: 100 INJECTION, SOLUTION INTRAVENOUS; SUBCUTANEOUS at 08:00

## 2018-05-05 RX ADMIN — METOPROLOL SUCCINATE 1 MG: 50 TABLET, EXTENDED RELEASE ORAL at 08:19

## 2018-05-05 RX ADMIN — INSULIN ASPART 1 UNIT: 100 INJECTION, SOLUTION INTRAVENOUS; SUBCUTANEOUS at 17:24

## 2018-05-05 RX ADMIN — ENOXAPARIN SODIUM 1 MG: 100 INJECTION SUBCUTANEOUS at 14:08

## 2018-05-06 LAB
ADD MAN DIFF?: NO
ANION GAP: 13 (ref 8–16)
BASOPHIL #: 0.1 10^3/UL (ref 0–0.1)
BASOPHILS %: 0.8 % (ref 0–2)
BLOOD UREA NITROGEN: 20 MG/DL (ref 7–20)
CALCIUM: 8.9 MG/DL (ref 8.4–10.2)
CARBON DIOXIDE: 27 MMOL/L (ref 21–31)
CHLORIDE: 111 MMOL/L (ref 97–110)
CREATININE: 0.89 MG/DL (ref 0.44–1)
EOSINOPHILS #: 0.2 10^3/UL (ref 0–0.5)
EOSINOPHILS %: 3.9 % (ref 0–7)
GLUCOSE: 170 MG/DL (ref 70–220)
HEMATOCRIT: 34.5 % (ref 37–47)
HEMOGLOBIN: 11.1 G/DL (ref 12–16)
LYMPHOCYTES #: 1.9 10^3/UL (ref 0.8–2.9)
LYMPHOCYTES %: 31 % (ref 15–51)
MAGNESIUM: 2.1 MG/DL (ref 1.7–2.5)
MEAN CORPUSCULAR HEMOGLOBIN: 28.3 PG (ref 29–33)
MEAN CORPUSCULAR HGB CONC: 32.2 G/DL (ref 32–37)
MEAN CORPUSCULAR VOLUME: 88 FL (ref 82–101)
MEAN PLATELET VOLUME: 10.4 FL (ref 7.4–10.4)
MONOCYTE #: 0.6 10^3/UL (ref 0.3–0.9)
MONOCYTES %: 9.5 % (ref 0–11)
NEUTROPHIL #: 3.3 10^3/UL (ref 1.6–7.5)
NEUTROPHILS %: 54.5 % (ref 39–77)
NUCLEATED RED BLOOD CELLS #: 0 10^3/UL (ref 0–0)
NUCLEATED RED BLOOD CELLS%: 0 /100WBC (ref 0–0)
PHOSPHORUS: 3.9 MG/DL (ref 2.5–4.9)
PLATELET COUNT: 325 10^3/UL (ref 140–415)
POTASSIUM: 3.8 MMOL/L (ref 3.5–5.1)
RED BLOOD COUNT: 3.92 10^6/UL (ref 4.2–5.4)
RED CELL DISTRIBUTION WIDTH: 13.6 % (ref 11.5–14.5)
SODIUM: 147 MMOL/L (ref 135–144)
THYROID STIMULATING HORMONE: 3.31 MIU/L (ref 0.47–4.68)
WHITE BLOOD COUNT: 6.1 10^3/UL (ref 4.8–10.8)

## 2018-05-06 RX ADMIN — METOPROLOL SUCCINATE 1 MG: 50 TABLET, EXTENDED RELEASE ORAL at 09:31

## 2018-05-06 RX ADMIN — OXYBUTYNIN CHLORIDE 1 MG: 5 TABLET ORAL at 09:31

## 2018-05-06 RX ADMIN — PIPERACILLIN SODIUM AND TAZOBACTAM SODIUM 1 MLS/HR: 3; .375 INJECTION, POWDER, LYOPHILIZED, FOR SOLUTION INTRAVENOUS at 14:01

## 2018-05-06 RX ADMIN — PIPERACILLIN SODIUM AND TAZOBACTAM SODIUM 1 MLS/HR: 3; .375 INJECTION, POWDER, LYOPHILIZED, FOR SOLUTION INTRAVENOUS at 21:47

## 2018-05-06 RX ADMIN — INSULIN ASPART 1 UNIT: 100 INJECTION, SOLUTION INTRAVENOUS; SUBCUTANEOUS at 09:27

## 2018-05-06 RX ADMIN — ATORVASTATIN CALCIUM 1 MG: 20 TABLET, FILM COATED ORAL at 21:37

## 2018-05-06 RX ADMIN — INSULIN ASPART 1 UNIT: 100 INJECTION, SOLUTION INTRAVENOUS; SUBCUTANEOUS at 18:03

## 2018-05-06 RX ADMIN — ENOXAPARIN SODIUM 1 MG: 100 INJECTION SUBCUTANEOUS at 09:32

## 2018-05-06 RX ADMIN — INSULIN ASPART 1 UNIT: 100 INJECTION, SOLUTION INTRAVENOUS; SUBCUTANEOUS at 21:43

## 2018-05-06 RX ADMIN — ASPIRIN 1 MG: 81 TABLET, COATED ORAL at 09:30

## 2018-05-06 RX ADMIN — DOCUSATE SODIUM 1 MG: 100 CAPSULE, LIQUID FILLED ORAL at 21:37

## 2018-05-06 RX ADMIN — INSULIN ASPART 1 UNIT: 100 INJECTION, SOLUTION INTRAVENOUS; SUBCUTANEOUS at 12:34

## 2018-05-06 RX ADMIN — INSULIN GLARGINE 1 UNIT: 100 INJECTION, SOLUTION SUBCUTANEOUS at 21:44

## 2018-05-06 RX ADMIN — COLLAGENASE SANTYL 1 APPLIC: 250 OINTMENT TOPICAL at 09:00

## 2018-05-06 RX ADMIN — INSULIN GLARGINE 1 UNIT: 100 INJECTION, SOLUTION SUBCUTANEOUS at 09:29

## 2018-05-06 RX ADMIN — PIPERACILLIN SODIUM AND TAZOBACTAM SODIUM 1 MLS/HR: 3; .375 INJECTION, POWDER, LYOPHILIZED, FOR SOLUTION INTRAVENOUS at 06:06

## 2018-05-07 LAB
ADD MAN DIFF?: NO
ALANINE AMINOTRANSFERASE: 30 IU/L (ref 13–69)
ALBUMIN/GLOBULIN RATIO: 0.92
ALBUMIN: 3.5 G/DL (ref 3.3–4.9)
ALKALINE PHOSPHATASE: 60 IU/L (ref 42–121)
ANION GAP: 12 (ref 8–16)
ASPARTATE AMINO TRANSFERASE: 35 IU/L (ref 15–46)
BASOPHIL #: 0 10^3/UL (ref 0–0.1)
BASOPHILS %: 0.7 % (ref 0–2)
BILIRUBIN,DIRECT: 0 MG/DL (ref 0–0.2)
BILIRUBIN,TOTAL: 0.2 MG/DL (ref 0.2–1.3)
BLOOD UREA NITROGEN: 28 MG/DL (ref 7–20)
CALCIUM: 9 MG/DL (ref 8.4–10.2)
CARBON DIOXIDE: 27 MMOL/L (ref 21–31)
CHLORIDE: 110 MMOL/L (ref 97–110)
CREATININE: 0.93 MG/DL (ref 0.44–1)
EOSINOPHILS #: 0.2 10^3/UL (ref 0–0.5)
EOSINOPHILS %: 3.6 % (ref 0–7)
GLOBULIN: 3.8 G/DL (ref 1.3–3.2)
GLUCOSE: 215 MG/DL (ref 70–220)
HEMATOCRIT: 33.6 % (ref 37–47)
HEMOGLOBIN: 10.9 G/DL (ref 12–16)
LYMPHOCYTES #: 1.7 10^3/UL (ref 0.8–2.9)
LYMPHOCYTES %: 31.1 % (ref 15–51)
MEAN CORPUSCULAR HEMOGLOBIN: 28.6 PG (ref 29–33)
MEAN CORPUSCULAR HGB CONC: 32.4 G/DL (ref 32–37)
MEAN CORPUSCULAR VOLUME: 88.2 FL (ref 82–101)
MEAN PLATELET VOLUME: 10.4 FL (ref 7.4–10.4)
MONOCYTE #: 0.5 10^3/UL (ref 0.3–0.9)
MONOCYTES %: 8.9 % (ref 0–11)
NEUTROPHIL #: 3.1 10^3/UL (ref 1.6–7.5)
NEUTROPHILS %: 55.2 % (ref 39–77)
NUCLEATED RED BLOOD CELLS #: 0 10^3/UL (ref 0–0)
NUCLEATED RED BLOOD CELLS%: 0 /100WBC (ref 0–0)
PLATELET COUNT: 290 10^3/UL (ref 140–415)
POTASSIUM: 4.2 MMOL/L (ref 3.5–5.1)
RED BLOOD COUNT: 3.81 10^6/UL (ref 4.2–5.4)
RED CELL DISTRIBUTION WIDTH: 13.4 % (ref 11.5–14.5)
SODIUM: 145 MMOL/L (ref 135–144)
TOTAL PROTEIN: 7.3 G/DL (ref 6.1–8.1)
WHITE BLOOD COUNT: 5.5 10^3/UL (ref 4.8–10.8)

## 2018-05-07 RX ADMIN — INSULIN ASPART 1 UNIT: 100 INJECTION, SOLUTION INTRAVENOUS; SUBCUTANEOUS at 12:21

## 2018-05-07 RX ADMIN — ATORVASTATIN CALCIUM 1 MG: 20 TABLET, FILM COATED ORAL at 22:03

## 2018-05-07 RX ADMIN — INSULIN GLARGINE 1 UNIT: 100 INJECTION, SOLUTION SUBCUTANEOUS at 22:29

## 2018-05-07 RX ADMIN — INSULIN GLARGINE 1 UNIT: 100 INJECTION, SOLUTION SUBCUTANEOUS at 08:39

## 2018-05-07 RX ADMIN — INSULIN ASPART 1 UNIT: 100 INJECTION, SOLUTION INTRAVENOUS; SUBCUTANEOUS at 17:56

## 2018-05-07 RX ADMIN — COLLAGENASE SANTYL 1 APPLIC: 250 OINTMENT TOPICAL at 11:13

## 2018-05-07 RX ADMIN — LEVOFLOXACIN 1 MLS/HR: 500 INJECTION, SOLUTION INTRAVENOUS at 12:20

## 2018-05-07 RX ADMIN — ENOXAPARIN SODIUM 1 MG: 100 INJECTION SUBCUTANEOUS at 08:39

## 2018-05-07 RX ADMIN — INSULIN ASPART 1 UNIT: 100 INJECTION, SOLUTION INTRAVENOUS; SUBCUTANEOUS at 08:38

## 2018-05-07 RX ADMIN — OXYBUTYNIN CHLORIDE 1 MG: 5 TABLET ORAL at 08:40

## 2018-05-07 RX ADMIN — ASPIRIN 1 MG: 81 TABLET, COATED ORAL at 08:40

## 2018-05-07 RX ADMIN — PIPERACILLIN SODIUM AND TAZOBACTAM SODIUM 1 MLS/HR: 3; .375 INJECTION, POWDER, LYOPHILIZED, FOR SOLUTION INTRAVENOUS at 05:34

## 2018-05-07 RX ADMIN — COLLAGENASE SANTYL 1 APPLIC: 250 OINTMENT TOPICAL at 09:00

## 2018-05-07 RX ADMIN — METOPROLOL SUCCINATE 1 MG: 50 TABLET, EXTENDED RELEASE ORAL at 08:40

## 2018-05-07 RX ADMIN — QUETIAPINE FUMARATE 1 MG: 25 TABLET ORAL at 00:02

## 2018-05-07 RX ADMIN — DOCUSATE SODIUM 1 MG: 100 CAPSULE, LIQUID FILLED ORAL at 22:03

## 2018-05-07 RX ADMIN — INSULIN ASPART 1 UNIT: 100 INJECTION, SOLUTION INTRAVENOUS; SUBCUTANEOUS at 21:00

## 2018-05-08 LAB
ADD MAN DIFF?: NO
ANION GAP: 14 (ref 8–16)
BASOPHIL #: 0.1 10^3/UL (ref 0–0.1)
BASOPHILS %: 0.8 % (ref 0–2)
BLOOD UREA NITROGEN: 25 MG/DL (ref 7–20)
CALCIUM: 9.3 MG/DL (ref 8.4–10.2)
CARBON DIOXIDE: 26 MMOL/L (ref 21–31)
CHLORIDE: 108 MMOL/L (ref 97–110)
CREATININE: 0.87 MG/DL (ref 0.44–1)
EOSINOPHILS #: 0.2 10^3/UL (ref 0–0.5)
EOSINOPHILS %: 3.7 % (ref 0–7)
GLUCOSE: 96 MG/DL (ref 70–220)
HEMATOCRIT: 35.7 % (ref 37–47)
HEMOGLOBIN: 11.6 G/DL (ref 12–16)
LYMPHOCYTES #: 2.2 10^3/UL (ref 0.8–2.9)
LYMPHOCYTES %: 34.2 % (ref 15–51)
MAGNESIUM: 1.5 MG/DL (ref 1.7–2.5)
MEAN CORPUSCULAR HEMOGLOBIN: 28.2 PG (ref 29–33)
MEAN CORPUSCULAR HGB CONC: 32.5 G/DL (ref 32–37)
MEAN CORPUSCULAR VOLUME: 86.9 FL (ref 82–101)
MEAN PLATELET VOLUME: 10.3 FL (ref 7.4–10.4)
MONOCYTE #: 0.5 10^3/UL (ref 0.3–0.9)
MONOCYTES %: 7.8 % (ref 0–11)
NEUTROPHIL #: 3.4 10^3/UL (ref 1.6–7.5)
NEUTROPHILS %: 53.3 % (ref 39–77)
NUCLEATED RED BLOOD CELLS #: 0 10^3/UL (ref 0–0)
NUCLEATED RED BLOOD CELLS%: 0 /100WBC (ref 0–0)
PHOSPHORUS: 4 MG/DL (ref 2.5–4.9)
PLATELET COUNT: 322 10^3/UL (ref 140–415)
POTASSIUM: 3.9 MMOL/L (ref 3.5–5.1)
RED BLOOD COUNT: 4.11 10^6/UL (ref 4.2–5.4)
RED CELL DISTRIBUTION WIDTH: 13.5 % (ref 11.5–14.5)
SODIUM: 144 MMOL/L (ref 135–144)
WHITE BLOOD COUNT: 6.4 10^3/UL (ref 4.8–10.8)

## 2018-05-08 RX ADMIN — INSULIN ASPART 1 UNIT: 100 INJECTION, SOLUTION INTRAVENOUS; SUBCUTANEOUS at 17:37

## 2018-05-08 RX ADMIN — INSULIN ASPART 1 UNIT: 100 INJECTION, SOLUTION INTRAVENOUS; SUBCUTANEOUS at 08:15

## 2018-05-08 RX ADMIN — INSULIN ASPART 1 UNIT: 100 INJECTION, SOLUTION INTRAVENOUS; SUBCUTANEOUS at 22:10

## 2018-05-08 RX ADMIN — METOPROLOL SUCCINATE 1 MG: 50 TABLET, EXTENDED RELEASE ORAL at 08:44

## 2018-05-08 RX ADMIN — INSULIN ASPART 1 UNIT: 100 INJECTION, SOLUTION INTRAVENOUS; SUBCUTANEOUS at 12:27

## 2018-05-08 RX ADMIN — INSULIN GLARGINE 1 UNIT: 100 INJECTION, SOLUTION SUBCUTANEOUS at 08:47

## 2018-05-08 RX ADMIN — ASPIRIN 1 MG: 81 TABLET, COATED ORAL at 08:44

## 2018-05-08 RX ADMIN — OXYBUTYNIN CHLORIDE 1 MG: 5 TABLET ORAL at 08:42

## 2018-05-08 RX ADMIN — HYDRALAZINE HYDROCHLORIDE 1 MG: 20 INJECTION INTRAMUSCULAR; INTRAVENOUS at 13:27

## 2018-05-08 RX ADMIN — DOCUSATE SODIUM 1 MG: 100 CAPSULE, LIQUID FILLED ORAL at 22:02

## 2018-05-08 RX ADMIN — INSULIN GLARGINE 1 UNIT: 100 INJECTION, SOLUTION SUBCUTANEOUS at 22:11

## 2018-05-08 RX ADMIN — MEROPENEM 1 MLS/HR: 1 INJECTION, POWDER, FOR SOLUTION INTRAVENOUS at 12:30

## 2018-05-08 RX ADMIN — MEROPENEM 1 MLS/HR: 1 INJECTION, POWDER, FOR SOLUTION INTRAVENOUS at 22:02

## 2018-05-08 RX ADMIN — ATORVASTATIN CALCIUM 1 MG: 20 TABLET, FILM COATED ORAL at 22:02

## 2018-05-09 LAB
ADD MAN DIFF?: NO
ANION GAP: 15 (ref 8–16)
BASOPHIL #: 0.1 10^3/UL (ref 0–0.1)
BASOPHILS %: 0.6 % (ref 0–2)
BLOOD UREA NITROGEN: 31 MG/DL (ref 7–20)
CALCIUM: 9.4 MG/DL (ref 8.4–10.2)
CARBON DIOXIDE: 23 MMOL/L (ref 21–31)
CHLORIDE: 108 MMOL/L (ref 97–110)
CREATININE: 0.89 MG/DL (ref 0.44–1)
EOSINOPHILS #: 0.1 10^3/UL (ref 0–0.5)
EOSINOPHILS %: 1.4 % (ref 0–7)
GLUCOSE: 141 MG/DL (ref 70–220)
HEMATOCRIT: 34.1 % (ref 37–47)
HEMOGLOBIN: 11.2 G/DL (ref 12–16)
LYMPHOCYTES #: 2.2 10^3/UL (ref 0.8–2.9)
LYMPHOCYTES %: 23.4 % (ref 15–51)
MAGNESIUM: 1.5 MG/DL (ref 1.7–2.5)
MEAN CORPUSCULAR HEMOGLOBIN: 28.1 PG (ref 29–33)
MEAN CORPUSCULAR HGB CONC: 32.8 G/DL (ref 32–37)
MEAN CORPUSCULAR VOLUME: 85.7 FL (ref 82–101)
MEAN PLATELET VOLUME: 10.2 FL (ref 7.4–10.4)
MONOCYTE #: 0.9 10^3/UL (ref 0.3–0.9)
MONOCYTES %: 9 % (ref 0–11)
NEUTROPHIL #: 6.1 10^3/UL (ref 1.6–7.5)
NEUTROPHILS %: 65.3 % (ref 39–77)
NUCLEATED RED BLOOD CELLS #: 0 10^3/UL (ref 0–0)
NUCLEATED RED BLOOD CELLS%: 0 /100WBC (ref 0–0)
PHOSPHORUS: 3.7 MG/DL (ref 2.5–4.9)
PLATELET COUNT: 360 10^3/UL (ref 140–415)
POTASSIUM: 3.7 MMOL/L (ref 3.5–5.1)
RED BLOOD COUNT: 3.98 10^6/UL (ref 4.2–5.4)
RED CELL DISTRIBUTION WIDTH: 13.2 % (ref 11.5–14.5)
SODIUM: 142 MMOL/L (ref 135–144)
WHITE BLOOD COUNT: 9.4 10^3/UL (ref 4.8–10.8)

## 2018-05-09 RX ADMIN — MEROPENEM 1 MLS/HR: 1 INJECTION, POWDER, FOR SOLUTION INTRAVENOUS at 12:47

## 2018-05-09 RX ADMIN — OXYBUTYNIN CHLORIDE 1 MG: 5 TABLET ORAL at 09:05

## 2018-05-09 RX ADMIN — DOCUSATE SODIUM 1 MG: 100 CAPSULE, LIQUID FILLED ORAL at 21:02

## 2018-05-09 RX ADMIN — METOPROLOL SUCCINATE 1 MG: 50 TABLET, EXTENDED RELEASE ORAL at 09:06

## 2018-05-09 RX ADMIN — INSULIN ASPART 1 UNIT: 100 INJECTION, SOLUTION INTRAVENOUS; SUBCUTANEOUS at 21:07

## 2018-05-09 RX ADMIN — HYDRALAZINE HYDROCHLORIDE 1 MG: 20 INJECTION INTRAMUSCULAR; INTRAVENOUS at 04:39

## 2018-05-09 RX ADMIN — INSULIN GLARGINE 1 UNIT: 100 INJECTION, SOLUTION SUBCUTANEOUS at 08:22

## 2018-05-09 RX ADMIN — ATORVASTATIN CALCIUM 1 MG: 20 TABLET, FILM COATED ORAL at 21:02

## 2018-05-09 RX ADMIN — MEROPENEM 1 MLS/HR: 1 INJECTION, POWDER, FOR SOLUTION INTRAVENOUS at 21:02

## 2018-05-09 RX ADMIN — INSULIN ASPART 1 UNIT: 100 INJECTION, SOLUTION INTRAVENOUS; SUBCUTANEOUS at 18:12

## 2018-05-09 RX ADMIN — ASPIRIN 1 MG: 81 TABLET, COATED ORAL at 09:06

## 2018-05-09 RX ADMIN — INSULIN GLARGINE 1 UNIT: 100 INJECTION, SOLUTION SUBCUTANEOUS at 21:06

## 2018-05-09 RX ADMIN — INSULIN ASPART 1 UNIT: 100 INJECTION, SOLUTION INTRAVENOUS; SUBCUTANEOUS at 12:08

## 2018-05-09 RX ADMIN — INSULIN ASPART 1 UNIT: 100 INJECTION, SOLUTION INTRAVENOUS; SUBCUTANEOUS at 08:21

## 2018-05-10 LAB
ADD MAN DIFF?: NO
ANION GAP: 15 (ref 8–16)
BASOPHIL #: 0.1 10^3/UL (ref 0–0.1)
BASOPHILS %: 1 % (ref 0–2)
BLOOD UREA NITROGEN: 27 MG/DL (ref 7–20)
CALCIUM: 9.1 MG/DL (ref 8.4–10.2)
CARBON DIOXIDE: 26 MMOL/L (ref 21–31)
CHLORIDE: 107 MMOL/L (ref 97–110)
CREATININE: 0.81 MG/DL (ref 0.44–1)
EOSINOPHILS #: 0.3 10^3/UL (ref 0–0.5)
EOSINOPHILS %: 3.6 % (ref 0–7)
GLUCOSE: 166 MG/DL (ref 70–220)
HEMATOCRIT: 33.4 % (ref 37–47)
HEMOGLOBIN: 11.1 G/DL (ref 12–16)
LYMPHOCYTES #: 1.9 10^3/UL (ref 0.8–2.9)
LYMPHOCYTES %: 26.3 % (ref 15–51)
MAGNESIUM: 1.6 MG/DL (ref 1.7–2.5)
MEAN CORPUSCULAR HEMOGLOBIN: 28.6 PG (ref 29–33)
MEAN CORPUSCULAR HGB CONC: 33.2 G/DL (ref 32–37)
MEAN CORPUSCULAR VOLUME: 86.1 FL (ref 82–101)
MEAN PLATELET VOLUME: 10.4 FL (ref 7.4–10.4)
MONOCYTE #: 0.7 10^3/UL (ref 0.3–0.9)
MONOCYTES %: 9.1 % (ref 0–11)
NEUTROPHIL #: 4.3 10^3/UL (ref 1.6–7.5)
NEUTROPHILS %: 59.6 % (ref 39–77)
NUCLEATED RED BLOOD CELLS #: 0 10^3/UL (ref 0–0)
NUCLEATED RED BLOOD CELLS%: 0 /100WBC (ref 0–0)
PHOSPHORUS: 3.8 MG/DL (ref 2.5–4.9)
PLATELET COUNT: 337 10^3/UL (ref 140–415)
POTASSIUM: 3.7 MMOL/L (ref 3.5–5.1)
RED BLOOD COUNT: 3.88 10^6/UL (ref 4.2–5.4)
RED CELL DISTRIBUTION WIDTH: 13.6 % (ref 11.5–14.5)
SODIUM: 144 MMOL/L (ref 135–144)
WHITE BLOOD COUNT: 7.1 10^3/UL (ref 4.8–10.8)

## 2018-05-10 RX ADMIN — INSULIN ASPART 1 UNIT: 100 INJECTION, SOLUTION INTRAVENOUS; SUBCUTANEOUS at 17:45

## 2018-05-10 RX ADMIN — ASPIRIN 1 MG: 81 TABLET, COATED ORAL at 08:58

## 2018-05-10 RX ADMIN — DOCUSATE SODIUM 1 MG: 100 CAPSULE, LIQUID FILLED ORAL at 21:35

## 2018-05-10 RX ADMIN — INSULIN GLARGINE 1 UNIT: 100 INJECTION, SOLUTION SUBCUTANEOUS at 09:00

## 2018-05-10 RX ADMIN — METOPROLOL SUCCINATE 1 MG: 50 TABLET, EXTENDED RELEASE ORAL at 08:58

## 2018-05-10 RX ADMIN — INSULIN ASPART 1 UNIT: 100 INJECTION, SOLUTION INTRAVENOUS; SUBCUTANEOUS at 12:30

## 2018-05-10 RX ADMIN — ATORVASTATIN CALCIUM 1 MG: 20 TABLET, FILM COATED ORAL at 21:35

## 2018-05-10 RX ADMIN — MAGNESIUM SULFATE HEPTAHYDRATE 1 MLS/HR: 40 INJECTION, SOLUTION INTRAVENOUS at 14:52

## 2018-05-10 RX ADMIN — OXYBUTYNIN CHLORIDE 1 MG: 5 TABLET ORAL at 09:04

## 2018-05-10 RX ADMIN — INSULIN ASPART 1 UNIT: 100 INJECTION, SOLUTION INTRAVENOUS; SUBCUTANEOUS at 09:01

## 2018-05-10 RX ADMIN — MEROPENEM 1 MLS/HR: 1 INJECTION, POWDER, FOR SOLUTION INTRAVENOUS at 08:58

## 2018-05-10 RX ADMIN — INSULIN ASPART 1 UNIT: 100 INJECTION, SOLUTION INTRAVENOUS; SUBCUTANEOUS at 21:46

## 2018-05-10 RX ADMIN — MEROPENEM 1 MLS/HR: 1 INJECTION, POWDER, FOR SOLUTION INTRAVENOUS at 21:35

## 2018-05-10 RX ADMIN — INSULIN GLARGINE 1 UNIT: 100 INJECTION, SOLUTION SUBCUTANEOUS at 21:45

## 2018-05-10 RX ADMIN — INSULIN ASPART 1 UNIT: 100 INJECTION, SOLUTION INTRAVENOUS; SUBCUTANEOUS at 22:12

## 2018-05-11 RX ADMIN — INSULIN ASPART 1 UNIT: 100 INJECTION, SOLUTION INTRAVENOUS; SUBCUTANEOUS at 17:53

## 2018-05-11 RX ADMIN — ATORVASTATIN CALCIUM 1 MG: 20 TABLET, FILM COATED ORAL at 21:26

## 2018-05-11 RX ADMIN — INSULIN ASPART 1 UNIT: 100 INJECTION, SOLUTION INTRAVENOUS; SUBCUTANEOUS at 21:31

## 2018-05-11 RX ADMIN — INSULIN ASPART 1 UNIT: 100 INJECTION, SOLUTION INTRAVENOUS; SUBCUTANEOUS at 09:07

## 2018-05-11 RX ADMIN — INSULIN GLARGINE 1 UNIT: 100 INJECTION, SOLUTION SUBCUTANEOUS at 21:32

## 2018-05-11 RX ADMIN — ASPIRIN 1 MG: 81 TABLET, COATED ORAL at 09:05

## 2018-05-11 RX ADMIN — INSULIN ASPART 1 UNIT: 100 INJECTION, SOLUTION INTRAVENOUS; SUBCUTANEOUS at 21:49

## 2018-05-11 RX ADMIN — METOPROLOL SUCCINATE 1 MG: 50 TABLET, EXTENDED RELEASE ORAL at 09:05

## 2018-05-11 RX ADMIN — ERTAPENEM SODIUM 1 MLS/HR: 1 INJECTION, POWDER, LYOPHILIZED, FOR SOLUTION INTRAVENOUS at 11:15

## 2018-05-11 RX ADMIN — DOCUSATE SODIUM 1 MG: 100 CAPSULE, LIQUID FILLED ORAL at 21:26

## 2018-05-11 RX ADMIN — INSULIN ASPART 1 UNIT: 100 INJECTION, SOLUTION INTRAVENOUS; SUBCUTANEOUS at 12:53

## 2018-05-11 RX ADMIN — OXYBUTYNIN CHLORIDE 1 MG: 5 TABLET ORAL at 09:05

## 2018-05-12 LAB
ADD MAN DIFF?: NO
ANION GAP: 15 (ref 8–16)
BASOPHIL #: 0.1 10^3/UL (ref 0–0.1)
BASOPHILS %: 0.7 % (ref 0–2)
BLOOD UREA NITROGEN: 30 MG/DL (ref 7–20)
CALCIUM: 8.6 MG/DL (ref 8.4–10.2)
CARBON DIOXIDE: 26 MMOL/L (ref 21–31)
CHLORIDE: 107 MMOL/L (ref 97–110)
CREATININE: 0.81 MG/DL (ref 0.44–1)
EOSINOPHILS #: 0.3 10^3/UL (ref 0–0.5)
EOSINOPHILS %: 4.5 % (ref 0–7)
GLUCOSE: 130 MG/DL (ref 70–220)
HEMATOCRIT: 34 % (ref 37–47)
HEMOGLOBIN: 11.1 G/DL (ref 12–16)
LYMPHOCYTES #: 2.3 10^3/UL (ref 0.8–2.9)
LYMPHOCYTES %: 31.6 % (ref 15–51)
MAGNESIUM: 1.9 MG/DL (ref 1.7–2.5)
MEAN CORPUSCULAR HEMOGLOBIN: 28.7 PG (ref 29–33)
MEAN CORPUSCULAR HGB CONC: 32.6 G/DL (ref 32–37)
MEAN CORPUSCULAR VOLUME: 87.9 FL (ref 82–101)
MEAN PLATELET VOLUME: 10.5 FL (ref 7.4–10.4)
MONOCYTE #: 0.7 10^3/UL (ref 0.3–0.9)
MONOCYTES %: 9.2 % (ref 0–11)
NEUTROPHIL #: 3.9 10^3/UL (ref 1.6–7.5)
NEUTROPHILS %: 53.6 % (ref 39–77)
NUCLEATED RED BLOOD CELLS #: 0 10^3/UL (ref 0–0)
NUCLEATED RED BLOOD CELLS%: 0 /100WBC (ref 0–0)
PHOSPHORUS: 3.9 MG/DL (ref 2.5–4.9)
PLATELET COUNT: 304 10^3/UL (ref 140–415)
POTASSIUM: 3.9 MMOL/L (ref 3.5–5.1)
RED BLOOD COUNT: 3.87 10^6/UL (ref 4.2–5.4)
RED CELL DISTRIBUTION WIDTH: 13.5 % (ref 11.5–14.5)
SODIUM: 144 MMOL/L (ref 135–144)
WHITE BLOOD COUNT: 7.3 10^3/UL (ref 4.8–10.8)

## 2018-05-12 RX ADMIN — INSULIN ASPART 1 UNIT: 100 INJECTION, SOLUTION INTRAVENOUS; SUBCUTANEOUS at 12:35

## 2018-05-12 RX ADMIN — INSULIN ASPART 1 UNIT: 100 INJECTION, SOLUTION INTRAVENOUS; SUBCUTANEOUS at 17:31

## 2018-05-12 RX ADMIN — ASPIRIN 1 MG: 81 TABLET, COATED ORAL at 08:42

## 2018-05-12 RX ADMIN — INSULIN ASPART 1 UNIT: 100 INJECTION, SOLUTION INTRAVENOUS; SUBCUTANEOUS at 08:15

## 2018-05-12 RX ADMIN — HYDRALAZINE HYDROCHLORIDE 1 MG: 20 INJECTION INTRAMUSCULAR; INTRAVENOUS at 13:01

## 2018-05-12 RX ADMIN — OXYBUTYNIN CHLORIDE 1 MG: 5 TABLET ORAL at 08:41

## 2018-05-12 RX ADMIN — ERTAPENEM SODIUM 1 MLS/HR: 1 INJECTION, POWDER, LYOPHILIZED, FOR SOLUTION INTRAVENOUS at 09:11

## 2018-05-12 RX ADMIN — METOPROLOL SUCCINATE 1 MG: 50 TABLET, EXTENDED RELEASE ORAL at 08:42

## 2018-05-12 RX ADMIN — AMLODIPINE BESYLATE 1 MG: 10 TABLET ORAL at 12:41

## 2019-04-15 ENCOUNTER — HOSPITAL ENCOUNTER (INPATIENT)
Dept: HOSPITAL 10 - E/R | Age: 84
LOS: 3 days | Discharge: HOME | DRG: 178 | End: 2019-04-18
Attending: INTERNAL MEDICINE | Admitting: INTERNAL MEDICINE
Payer: MEDICARE

## 2019-04-15 ENCOUNTER — HOSPITAL ENCOUNTER (INPATIENT)
Dept: HOSPITAL 91 - E/R | Age: 84
LOS: 3 days | Discharge: HOME | DRG: 178 | End: 2019-04-18
Payer: MEDICARE

## 2019-04-15 VITALS — SYSTOLIC BLOOD PRESSURE: 136 MMHG | RESPIRATION RATE: 18 BRPM | DIASTOLIC BLOOD PRESSURE: 60 MMHG | HEART RATE: 85 BPM

## 2019-04-15 VITALS — HEART RATE: 75 BPM | DIASTOLIC BLOOD PRESSURE: 70 MMHG | RESPIRATION RATE: 18 BRPM | SYSTOLIC BLOOD PRESSURE: 146 MMHG

## 2019-04-15 VITALS
WEIGHT: 125.22 LBS | WEIGHT: 125.22 LBS | HEIGHT: 60 IN | BODY MASS INDEX: 24.58 KG/M2 | BODY MASS INDEX: 24.58 KG/M2 | HEIGHT: 60 IN

## 2019-04-15 DIAGNOSIS — G30.9: ICD-10-CM

## 2019-04-15 DIAGNOSIS — J69.0: Primary | ICD-10-CM

## 2019-04-15 DIAGNOSIS — I10: ICD-10-CM

## 2019-04-15 DIAGNOSIS — E11.9: ICD-10-CM

## 2019-04-15 DIAGNOSIS — F02.81: ICD-10-CM

## 2019-04-15 DIAGNOSIS — E78.5: ICD-10-CM

## 2019-04-15 DIAGNOSIS — I35.0: ICD-10-CM

## 2019-04-15 DIAGNOSIS — G93.49: ICD-10-CM

## 2019-04-15 LAB
ADD MAN DIFF?: NO
ALANINE AMINOTRANSFERASE: < 6 IU/L (ref 13–69)
ALBUMIN/GLOBULIN RATIO: 0.95
ALBUMIN: 4.4 G/DL (ref 3.3–4.9)
ALKALINE PHOSPHATASE: 96 IU/L (ref 42–121)
ANION GAP: 16 (ref 5–13)
ASPARTATE AMINO TRANSFERASE: 18 IU/L (ref 15–46)
B-TYPE NATRIURETIC PEPTIDE: 577 PG/ML (ref 0–450)
BASOPHIL #: 0 10^3/UL (ref 0–0.1)
BASOPHILS %: 0.4 % (ref 0–2)
BILIRUBIN,DIRECT: 0 MG/DL (ref 0–0.2)
BILIRUBIN,TOTAL: 0.3 MG/DL (ref 0.2–1.3)
BLOOD UREA NITROGEN: 27 MG/DL (ref 7–20)
CALCIUM: 9.4 MG/DL (ref 8.4–10.2)
CARBON DIOXIDE: 23 MMOL/L (ref 21–31)
CHLORIDE: 101 MMOL/L (ref 97–110)
CREATININE: 1.01 MG/DL (ref 0.44–1)
EOSINOPHILS #: 0.1 10^3/UL (ref 0–0.5)
EOSINOPHILS %: 1.4 % (ref 0–7)
GLOBULIN: 4.6 G/DL (ref 1.3–3.2)
GLUCOSE: 295 MG/DL (ref 70–220)
HEMATOCRIT: 39.3 % (ref 37–47)
HEMOGLOBIN: 12.6 G/DL (ref 12–16)
IMMATURE GRANS #M: 0.06 10^3/UL (ref 0–0.03)
IMMATURE GRANS % (M): 0.6 % (ref 0–0.43)
LYMPHOCYTES #: 2 10^3/UL (ref 0.8–2.9)
LYMPHOCYTES %: 21.5 % (ref 15–51)
MEAN CORPUSCULAR HEMOGLOBIN: 28.3 PG (ref 29–33)
MEAN CORPUSCULAR HGB CONC: 32.1 G/DL (ref 32–37)
MEAN CORPUSCULAR VOLUME: 88.3 FL (ref 82–101)
MEAN PLATELET VOLUME: 10.2 FL (ref 7.4–10.4)
MONOCYTE #: 0.4 10^3/UL (ref 0.3–0.9)
MONOCYTES %: 3.8 % (ref 0–11)
NEUTROPHIL #: 6.9 10^3/UL (ref 1.6–7.5)
NEUTROPHILS %: 72.3 % (ref 39–77)
NUCLEATED RED BLOOD CELLS #: 0 10^3/UL (ref 0–0)
NUCLEATED RED BLOOD CELLS%: 0 /100WBC (ref 0–0)
PLATELET COUNT: 354 10^3/UL (ref 140–415)
POTASSIUM: 4.3 MMOL/L (ref 3.5–5.1)
RED BLOOD COUNT: 4.45 10^6/UL (ref 4.2–5.4)
RED CELL DISTRIBUTION WIDTH: 13.1 % (ref 11.5–14.5)
SODIUM: 140 MMOL/L (ref 135–144)
TOTAL PROTEIN: 9 G/DL (ref 6.1–8.1)
TROPONIN-I: < 0.012 NG/ML (ref 0–0.12)
WHITE BLOOD COUNT: 9.5 10^3/UL (ref 4.8–10.8)

## 2019-04-15 PROCEDURE — 99285 EMERGENCY DEPT VISIT HI MDM: CPT

## 2019-04-15 PROCEDURE — 82962 GLUCOSE BLOOD TEST: CPT

## 2019-04-15 PROCEDURE — 71045 X-RAY EXAM CHEST 1 VIEW: CPT

## 2019-04-15 PROCEDURE — 97162 PT EVAL MOD COMPLEX 30 MIN: CPT

## 2019-04-15 PROCEDURE — 93306 TTE W/DOPPLER COMPLETE: CPT

## 2019-04-15 PROCEDURE — 83036 HEMOGLOBIN GLYCOSYLATED A1C: CPT

## 2019-04-15 PROCEDURE — 84484 ASSAY OF TROPONIN QUANT: CPT

## 2019-04-15 PROCEDURE — 80069 RENAL FUNCTION PANEL: CPT

## 2019-04-15 PROCEDURE — 97110 THERAPEUTIC EXERCISES: CPT

## 2019-04-15 PROCEDURE — 94664 DEMO&/EVAL PT USE INHALER: CPT

## 2019-04-15 PROCEDURE — 83880 ASSAY OF NATRIURETIC PEPTIDE: CPT

## 2019-04-15 PROCEDURE — 83735 ASSAY OF MAGNESIUM: CPT

## 2019-04-15 PROCEDURE — 80053 COMPREHEN METABOLIC PANEL: CPT

## 2019-04-15 PROCEDURE — 36415 COLL VENOUS BLD VENIPUNCTURE: CPT

## 2019-04-15 PROCEDURE — 97530 THERAPEUTIC ACTIVITIES: CPT

## 2019-04-15 PROCEDURE — 85025 COMPLETE CBC W/AUTO DIFF WBC: CPT

## 2019-04-15 PROCEDURE — 97165 OT EVAL LOW COMPLEX 30 MIN: CPT

## 2019-04-15 PROCEDURE — 96374 THER/PROPH/DIAG INJ IV PUSH: CPT

## 2019-04-15 PROCEDURE — 94640 AIRWAY INHALATION TREATMENT: CPT

## 2019-04-15 PROCEDURE — 87040 BLOOD CULTURE FOR BACTERIA: CPT

## 2019-04-15 RX ADMIN — IPRATROPIUM BROMIDE AND ALBUTEROL SULFATE 1 ML: .5; 3 SOLUTION RESPIRATORY (INHALATION) at 21:14

## 2019-04-15 RX ADMIN — BUDESONIDE SCH MG: 0.5 SUSPENSION RESPIRATORY (INHALATION) at 21:15

## 2019-04-15 RX ADMIN — INSULIN ASPART 1 UNIT: 100 INJECTION, SOLUTION INTRAVENOUS; SUBCUTANEOUS at 18:00

## 2019-04-15 RX ADMIN — BUDESONIDE 1 MG: 0.5 SUSPENSION RESPIRATORY (INHALATION) at 21:14

## 2019-04-15 RX ADMIN — BUDESONIDE 1 MG: 0.5 SUSPENSION RESPIRATORY (INHALATION) at 21:15

## 2019-04-15 RX ADMIN — ATORVASTATIN CALCIUM 1 MG: 10 TABLET, FILM COATED ORAL at 21:11

## 2019-04-15 RX ADMIN — ATORVASTATIN CALCIUM SCH MG: 10 TABLET, FILM COATED ORAL at 21:11

## 2019-04-15 RX ADMIN — IPRATROPIUM BROMIDE AND ALBUTEROL SULFATE SCH ML: .5; 3 SOLUTION RESPIRATORY (INHALATION) at 21:14

## 2019-04-15 RX ADMIN — IPRATROPIUM BROMIDE AND ALBUTEROL SULFATE SCH ML: .5; 3 SOLUTION RESPIRATORY (INHALATION) at 14:00

## 2019-04-15 RX ADMIN — BUDESONIDE SCH MG: 0.5 SUSPENSION RESPIRATORY (INHALATION) at 21:14

## 2019-04-15 RX ADMIN — AZITHROMYCIN MONOHYDRATE 1 MLS/HR: 500 INJECTION, POWDER, LYOPHILIZED, FOR SOLUTION INTRAVENOUS at 13:40

## 2019-04-15 RX ADMIN — METRONIDAZOLE 1 MLS/HR: 500 SOLUTION INTRAVENOUS at 21:16

## 2019-04-15 RX ADMIN — Medication SCH MLS/HR: at 15:13

## 2019-04-15 RX ADMIN — INSULIN ASPART 1 UNIT: 100 INJECTION, SOLUTION INTRAVENOUS; SUBCUTANEOUS at 21:00

## 2019-04-15 RX ADMIN — METRONIDAZOLE 1 MLS/HR: 500 SOLUTION INTRAVENOUS at 15:13

## 2019-04-15 RX ADMIN — CEFTRIAXONE 1 MLS/HR: 1 INJECTION, SOLUTION INTRAVENOUS at 12:23

## 2019-04-15 RX ADMIN — IPRATROPIUM BROMIDE AND ALBUTEROL SULFATE 1 ML: .5; 3 SOLUTION RESPIRATORY (INHALATION) at 14:00

## 2019-04-15 RX ADMIN — Medication SCH MLS/HR: at 21:16

## 2019-04-15 RX ADMIN — DEXTROSE MONOHYDRATE 1 ML: 500 INJECTION PARENTERAL at 18:01

## 2019-04-15 NOTE — RADRPT
Echocardiogram Report

 

Patient Name: Jamshid FLORESent ID: [id number]

: 1935 (83y 7m)Study Date: 04/15/2019 2:52:51 PM

Gender: FAccession #: [id number]

Tech: JUAN [facility]              Location: [facility]         

Ref.Physician: CLEM KEE            Height(Cm):            

 

BSA: Weight(Kg):

Quality: AdequateAccount #:

 

 

Procedures:

 

Echocardiographic Report:

Transthoracic echocardiogram with complete 2D, M-Mode, and doppler 

examination.

 

Indications:

 

Congestive Heart Failure.

 

 

Measurements:

2D/M Mode                                          Doppler                                           
    

Measurement    Value    Normal Range               Measurement      Value    Normal Range            
    

LVIDd 2D       3.1      [ 3.8 - 5.2 ] cm           DION VTI          1.4      [ 2.0 - 4.0 ] cm2       
    

LVIDs 2D       2.3      [ 2.2 - 3.5 ] cm           AV Mean William      1.8      [ 70.0 - 90.0 ] cm/sec  
    

LVPWd 2D       1.2      [ 0.6 - 0.9 ] cm           AV Mean PG       14.0     [ 2.0 - 4.0 ] mmHg      
    

IVSd 2D        1.6      [ 0.6 - 0.9 ] cm           AV VTI           57.4     cm                      
    

AoR Diam 2D    2.3      [ 2.3 - 3.1 ] cm           LVOT Mean William    0.6      [ 60.0 - 80.0 ] cm/sec  
    

EDV 2D         38.2     [ 46.0 - 106.0 ] ml        LVOT Mean PG     2.0      [ 1.0 - 3.0 ] mmHg      
    

ESV 2D         17.5     [ 14.0 - 42.0 ] ml         LVOT Peak William    1.0      [ 70.0 - 110.0 ] cm/sec 
    

EF 2D          54.2     [ 54.0 - 74.0 ] percent    LVOT Peak PG     4.0      [ 2.0 - 6.0 ] mmHg      
    

LA Dimen 2D    3.3      [ 2.7 - 3.8 ] cm           LVOT VTI         27.3     [ 20.0 - 30.0 ] cm      
    

LVOT Diam      1.9      [ 2.1 - 2.5 ] cm           MV E Peak William    0.8      [ 60.0 - 130.0 ] cm/sec 
    

                                                   MV A Peak William    1.1      [ 100.0 - 120.0 ] cm/sec
    

                                                   MV E/A           0.7      [ 0.8 - 1.5 ] ratio     
    

                                                   MV Decel Time    292      [ 104 - 258 ] msec      
    

                                                   Lat E` William       0.1      [ 10.0 - 15.0 ] cm/sec  
    

                                                   Lateral E/E`     12.3     [ 1.0 - 2.0 ] ratio     
    

                                                   MV E/A           0.7      [ 0.8 - 1.5 ] ratio     
    

                                                   TR Peak William      2.4      [ 100.0 - 280.0 ] cm/sec
    

                                                   TR Peak PG       23.0     mmHg                    
    

                                                   RVSP             33.0     [ 10.0 - 36.0 ] mmHg    
    

                                                   RA Pressure      10.0     mmHg                    
    

 

Findings:

 

Left Ventricle:

Normal left ventricular systolic function. Normal left ventricular 

cavity size. Moderate asymmetric septal hypertrophy. Ejection fraction 

is visually estimated at 65 %. Tissue Doppler/Mitral Doppler indices are 

consistent with impaired relaxation (Stage I diastolic dysfunction).

 

Right Ventricle:

Normal right ventricular size. Normal right ventricular systolic 

function.

 

Left Atrium:

The left atrium is normal in size.

 

Right Atrium:

The right atrium is normal in size.

 

Mitral Valve:

Mitral valve leaflets appear mildly thickened. Moderate mitral annular 

calcification. Mild mitral valve regurgitation.

 

Aortic Valve:

Moderate aortic stenosis. Aortic valve Max velocity 2.38 m/sec. Max PG 

22.70 mmHg. Mean PG 17.00 mmHg. Aortic cusps appear moderately 

calcified. Trileaflet aortic valve. Mild aortic valve regurgitation.

 

Tricuspid Valve:

Normal appearance of the tricuspid valve. Estimated peak PA systolic 

pressure 33 mmHg. There is mild tricuspid regurgitation.

 

Pulmonic Valve:

Pulmonic valve not well visualized.

 

Pericardium:

Normal pericardium with no significant pericardial effusion.

 

Aorta:

Normal aortic root.

 

IVC:

Normal size and normal respiratory collapse consistent with normal right 

atrial pressure.

 

 

Conclusions:

 

Normal left ventricular systolic function. Normal left ventricular 

cavity size. Moderate asymmetric septal hypertrophy. Ejection fraction 

is visually estimated at 65 %. Tissue Doppler/Mitral Doppler indices are 

consistent with impaired relaxation (Stage I diastolic dysfunction).

 

Mitral valve leaflets appear mildly thickened. Moderate mitral annular 

calcification. Mild mitral valve regurgitation.

 

Normal appearance of the tricuspid valve. Estimated peak PA systolic 

pressure 33 mmHg. There is mild tricuspid regurgitation.

 

Moderate aortic stenosis. Aortic valve Max velocity 2.38 m/sec. Max PG 

22.70 mmHg. Mean PG 17.00 mmHg. Aortic cusps appear moderately 

calcified. Trileaflet aortic valve. Mild aortic valve regurgitation.

 

Electronically Signed By:

 

Fam Cardenas

2019-04-15 16:51:36 PDT

## 2019-04-15 NOTE — HP
Date/Time of Note


Date/Time of Note


DATE: 4/15/19 


TIME: 14:02





Assessment/Plan


VTE Prophylaxis


Pharmacological prophylaxis:  other





Lines/Catheters


IV Catheter Type (from Nrsg):  Peripheral IV





Assessment/Plan


Hospital Course


Patient is an elderly female with a past medical history significant for 


dementia, diabetes, anemia, hypertension, dyslipidemia, ESBL UTI who presents to


[facility] for 2 weeks of unrelenting cough which causes her 


shortness of breath when laying down.  Information is provided from son at 


bedside as patient does have severe dementia and unable to cooperate for this 


HPI.  According to son patient has had persistent cough that is worse in the 


past 2 weeks, has sputum but patient swallows it, no fevers but patient having 


worse sounding lungs at night.  Further HPI cannot be concluded due to patient's


mental status





Objective





Physical exam





General: Patient is laying in bed and answers questions but sporadically


Mentation: Patient is alert and oriented to self only


Head: Normocephalic atraumatic


Eyes: EOMI, pupils reactive to light


Neck: Supple, nontender, midline


Respiratory: Coarse to auscultation bilaterally


Cardiovascular: regular rate, no obvious murmurs


Gastrointestinal: non-tender to palpation, bowel sounds heard.


Neurological: Moves all extremities spontaneously


Skin: No new skin lesions





Assessment and plan





Aspiration pneumonia


-Mild but persistent and given patient's risk factors of diabetes, hypertension,


ESBL UTI, patient is at risk of getting a worsening developing pneumonia


-We will do IV antibiotic, ceftriaxone and metronidazole for aspiration 


pneumonia


-DuoNeb and budesonide nebulizer





Shortness of breath


-Mild, continue DuoNeb and budesonide nebulizer





Elevated BNP


-May be secondary to developing pneumonia or from heart issues


-Echocardiogram pending





Cough


-Robitussin-DM as needed





Elevated BNP


-May be secondary to pneumonia however will hold off on IV fluid due to possible


developing volume overload


-IV fluid was considered due to patient's decreased p.o. intake however will 


need to hold due to unknown BNP elevation





Chronic encephalopathy


-Secondary to severe dementia, monitor





History of ESBL UTI


-Unable to determine if patient has any urinary issues as she has severe 


dementia, UA pending as it may be contributing to some of above





Hypertension


-Continue home meds





Diabetes mellitus


-Lantus in the morning as per home direction, will use sliding scale, hold 


metformin for now, adjust as needed





Dyslipidemia


-Continue home medication





Disposition


-Patient will need restraints as she is pulling IV lines and interfering with 


medical care


-Continue IV antibiotics


-Anticipate 1-2-day stay, once stable patient likely okay to DC


Result Diagram:  


4/15/19 1222                                                                    


           4/15/19 1222





Results 24hrs





Laboratory Tests


               Test
                                4/15/19
12:22


               White Blood Count                           9.5  #


               Red Blood Count                             4.45


               Hemoglobin                                  12.6


               Hematocrit                                  39.3


               Mean Corpuscular Volume                     88.3


               Mean Corpuscular Hemoglobin                28.3  L


               Mean Corpuscular Hemoglobin
Concent        32.1  



               Red Cell Distribution Width                 13.1


               Platelet Count                               354


               Mean Platelet Volume                        10.2


               Immature Granulocytes %                   0.600  H


               Neutrophils %                               72.3


               Lymphocytes %                               21.5


               Monocytes %                                  3.8


               Eosinophils %                                1.4


               Basophils %                                  0.4


               Nucleated Red Blood Cells %                  0.0


               Immature Granulocytes #                   0.060  H


               Neutrophils #                                6.9


               Lymphocytes #                                2.0


               Monocytes #                                  0.4


               Eosinophils #                                0.1


               Basophils #                                  0.0


               Nucleated Red Blood Cells #                  0.0


               Sodium Level                                 140


               Potassium Level                              4.3


               Chloride Level                               101


               Carbon Dioxide Level                          23


               Anion Gap                                    16  H


               Blood Urea Nitrogen                          27  H


               Creatinine                                 1.01  H


               Est Glomerular Filtrat Rate
mL/min     



               Glucose Level                               295  H


               Calcium Level                                9.4


               Total Bilirubin                              0.3


               Direct Bilirubin                            0.00


               Indirect Bilirubin                           0.3


               Aspartate Amino Transf
(AST/SGOT)            18  



               Alanine Aminotransferase
(ALT/SGPT)  < 6  L



               Alkaline Phosphatase                          96


               Troponin I                           < 0.012


               B-Type Natriuretic Peptide                  577  H


               Total Protein                               9.0  H


               Albumin                                      4.4


               Globulin                                   4.60  H


               Albumin/Globulin Ratio                      0.95








HPI/ROS


Admit Date/Time


Admit Date/Time








PMH/Family/Social


Past Medical History


Medications





Current Medications


Amlodipine Besylate (Norvasc) 10 mg DAILY PO ;  Start 4/16/19 at 09:00;  Status 


UNV


Aspirin (Halfprin) 81 mg DAILY PO ;  Start 4/16/19 at 09:00;  Status UNV


Losartan Potassium (Cozaar) 100 mg DAILY PO ;  Start 4/16/19 at 09:00;  Status 


UNV


Metoprolol Succinate (Toprol Xl) 50 mg DAILY PO ;  Start 4/16/19 at 09:00;  


Status UNV


Oxybutynin Chloride (Ditropan) 5 mg DAILY PO ;  Start 4/16/19 at 09:00;  Status 


UNV


Atorvastatin Calcium (Lipitor) 10 mg HS PO ;  Start 4/15/19 at 21:00;  Status 


UNV


Ondansetron HCl (Zofran Inj) 4 mg BRIDGE ORDER PRN IV NAUSEA/VOMITING;  Start 


4/15/19 at 14:00;  Stop 4/16/19 at 13:59


Acetaminophen (Tylenol Tab) 650 mg ER BRIDGE PRN PO .MILD PAIN 1-3 OR TEMP;  


Start 4/15/19 at 14:00;  Stop 4/16/19 at 13:59


Coded Allergies:  


     No Known Allergy (Unverified , 4/15/19)





Past Surgical History


Past Surgical Hx:  other





Family History


Significant Family History:  other





Social History


Smoking Status:  Never smoker





Exam/Review of Systems


Vital Signs


Vitals





Vital Signs


  Date      Temp  Pulse  Resp  B/P (MAP)   Pulse Ox  O2          O2 Flow    FiO2


Time                                                 Delivery    Rate


   4/15/19  98.5     71    20      128/60        97


     11:31                           (82)














CLEM KEE                    Apr 15, 2019 14:02

## 2019-04-15 NOTE — ERD
ER Documentation


Chief Complaint


Chief Complaint





cough and congestion for the past few days. with no fevers. no distress.





HPI


This is an 83-year-old female who has had a cough for 2 weeks and getting worse 


the past few days.  There is some productive sputum but she just swallows it.  


Patient has a history of Alzheimer's dementia and blood pressure elevation.  She


has had a decrease in appetite over the past couple of days but no fever has 


been documented.  No shortness of breath or increased work of breathing no GI 


symptoms no decrease urinary output





ROS


All systems reviewed and are negative except as per history of present illness.





Medications


Home Meds


Reported Medications


Oxybutynin Chloride* (Ditropan*) 5 Mg Tablet, 5 MG PO DAILY, TAB


   4/15/19


Aspirin* (Aspirin* EC) 81 Mg Tablet.dr, 81 MG PO DAILY, TAB


   4/15/19


Rosuvastatin Calcium* (Crestor*) 5 Mg Tablet, 5 MG PO QHS, #30 TAB


   4/15/19


Losartan Potassium* (Losartan Potassium*) 100 Mg Tablet, 100 MG PO DAILY, TAB


   4/15/19


Amlodipine Besylate* (Amlodipine Besylate*) 10 Mg Tablet, 10 MG PO DAILY, #30 


TAB


   4/15/19


Metoprolol Succinate* (Toprol XL*) 50 Mg Tab.er.24h, 50 MG PO DAILY, #30 TAB


   4/15/19


Metformin Hcl* (Metformin Hcl*) 500 Mg Tablet, 500 MG PO WITH BREAKFAST DINNE 


PRN for AS NEEDED, #60 TAB


   4/15/19


Insulin Glargine* (Lantus*) 100 Unit/Ml Soln, 0 SC BID, #1 VIAL


   SLIDING SCALE


   4/15/19


Discontinued Reported Medications


Oxybutynin Chloride* (Ditropan*) 5 Mg Tab, 5 MG PO DAILY, TAB


   4/13/18


Amlodipine Besylate* (Amlodipine Besylate*) 10 Mg Tablet, 10 MG PO DAILY, #30 


TAB


   4/13/18


Metformin Hcl* (Metformin Hcl*) 500 Mg Tablet, 500 MG PO WITH MEALS, #90 TAB


   4/13/18


Aspirin* (Aspirin* EC) 81 Mg Tablet.dr, 81 MG PO DAILY, TAB


   4/13/18


Rosuvastatin Calcium* (Crestor*) 5 Mg Tablet, 5 MG PO QHS, #30 TAB


   4/13/18


Metoprolol Succinate* (Toprol XL*) 50 Mg Tab.er.24h, 50 MG PO DAILY, #30 TAB


   4/13/18


Insulin Glargine* (Lantus*) 100 Unit/Ml Soln, 6 UNIT SC BID, #1 VIAL


   4/13/18





Allergies


Allergies:  


Coded Allergies:  


     No Known Allergy (Unverified , 4/15/19)





PMhx/Soc


History of Surgery:  Yes (cholecystectomy)


Anesthesia Reaction:  No


Hx Neurological Disorder:  Yes (advanced alzheimer)


Hx Respiratory Disorders:  No


Hx Cardiac Disorders:  No


Hx Psychiatric Problems:  No


Hx Miscellaneous Medical Probl:  Yes (see PT note)


Hx Alcohol Use:  No


Hx Substance Use:  No


Hx Tobacco Use:  No





FmHx


Family History:  No coronary disease





Physical Exam


Vitals





Vital Signs


  Date      Temp  Pulse  Resp  B/P (MAP)   Pulse Ox  O2          O2 Flow    FiO2


Time                                                 Delivery    Rate


   4/15/19  98.5     71    20      128/60        97


     11:31                           (82)





Physical Exam


 Const:      Well-developed, well-nourished


 Head:        Atraumatic, normocephalic


 Eyes:       Normal Conjunctiva, PERRLA, EOMI, normal sclera, no nystagmus


 ENT:         Normal External Ears, Nose and Mouth, moist mucus membranes.


 Neck:        Full range of motion.  No meningismus, no lymphadenopathy.


 Resp:         No increased work of breathing, bibasilar rhonchi]


 Cardio:       Regular rate and rhythm, no murmurs, S1 S2 present


 Abd:         Soft, non tender x 4, non distended. Normal bowel sounds, no 


guarding or rebound, no pulsitile abdominal masses or bruits


 Skin:         No petechiae or rashes, no ecchymosis , no maculopapular rash


 Back:        No midline or flank tenderness


 Ext:          No cyanosis, or edema, FROM x 4, normal inspection, neurovasc


ularly intact x 4


 Neur:        Awake and alert, STR 5/5 x 4, sensation intact x 4, no focal 


findings, cerebellum intact


 Psych:        Normal Mood and Affect


Result Diagram:  


4/15/19 1222                                                                    


           4/15/19 1222





Results 24 hrs





Laboratory Tests


              Test
                                 4/15/19
12:22


              White Blood Count                      9.5 10^3/ul


              Red Blood Count                       4.45 10^6/ul


              Hemoglobin                               12.6 g/dl


              Hematocrit                                  39.3 %


              Mean Corpuscular Volume                    88.3 fl


              Mean Corpuscular Hemoglobin                28.3 pg


              Mean Corpuscular Hemoglobin
Concent     32.1 g/dl 



              Red Cell Distribution Width                 13.1 %


              Platelet Count                         354 10^3/UL


              Mean Platelet Volume                       10.2 fl


              Immature Granulocytes %                    0.600 %


              Neutrophils %                               72.3 %


              Lymphocytes %                               21.5 %


              Monocytes %                                  3.8 %


              Eosinophils %                                1.4 %


              Basophils %                                  0.4 %


              Nucleated Red Blood Cells %            0.0 /100WBC


              Immature Granulocytes #              0.060 10^3/ul


              Neutrophils #                          6.9 10^3/ul


              Lymphocytes #                          2.0 10^3/ul


              Monocytes #                            0.4 10^3/ul


              Eosinophils #                          0.1 10^3/ul


              Basophils #                            0.0 10^3/ul


              Nucleated Red Blood Cells #            0.0 10^3/ul


              Sodium Level                            140 mmol/L


              Potassium Level                         4.3 mmol/L


              Chloride Level                          101 mmol/L


              Carbon Dioxide Level                     23 mmol/L


              Anion Gap                                       16


              Blood Urea Nitrogen                       27 mg/dl


              Creatinine                              1.01 mg/dl


              Est Glomerular Filtrat Rate
mL/min    mL/min 



              Glucose Level                            295 mg/dl


              Calcium Level                            9.4 mg/dl


              Total Bilirubin                          0.3 mg/dl


              Direct Bilirubin                        0.00 mg/dl


              Indirect Bilirubin                       0.3 mg/dl


              Aspartate Amino Transf
(AST/SGOT)         18 IU/L 



              Alanine Aminotransferase
(ALT/SGPT)  < 6 IU/L 



              Alkaline Phosphatase                       96 IU/L


              Troponin I                           < 0.012 ng/ml


              B-Type Natriuretic Peptide               577 PG/ML


              Total Protein                             9.0 g/dl


              Albumin                                   4.4 g/dl


              Globulin                                 4.60 g/dl


              Albumin/Globulin Ratio                        0.95





Current Medications


 Medications
   Dose
          Sig/Bryan
       Start Time
   Status  Last


 (Trade)       Ordered        Route
 PRN     Stop Time              Admin
Dose


                              Reason                                Admin


 Azithromycin   250 ml @ 
     ONCE  STAT
    4/15/19       DC       



               250 mls/hr     IV
            12:09



                                             4/15/19 13:08


 Ceftriaxone    50 ml @ 
      ONCE  STAT
    4/15/19       DC           4/15/19


Sodium         100 mls/hr     IVPB
          12:09
                       12:23



                                             4/15/19 12:38








Procedures/MDM


       MR #:    [id number]   Acct #:   [id number]    DOS: 04/15/19 1209


Ordering MD: VIANCA GIBBS DO   Location:  [facility]   Room/Bed:                


                           


                                        


PROCEDURE:   XR chest. 


 


CLINICAL INDICATION:  Shortness of breath


 


TECHNIQUE:   A single portable view of the chest was obtained.  


 


COMPARISON:   05/03/2018 


 


FINDINGS:


 


The patient's chin superimposes the lung apices. Bibasilar opacities are 


identified which could represent infection or aspiration in the acute setting. 


There is no pleural effusion or pneumothorax. The cardiac silhouette is within 


normal limits. The aorta is tortuous and atherosclerotic.


 


IMPRESSION:


 


1.  Bibasilar opacities representing infection or aspiration in the acute 


setting.


 


 


 


RPTAT: AAEE


_____________________________________________ 


Physician Dale           Date    Time 


Electronically viewed and signed by Physician Dale on 04/15/2019 


13:07 


 


D:  04/15/2019 13:07  T:  04/15/2019 13:07


RF/





CC: VIANCA GIBBS DO





[id number]

















Will admit the patient for bibasilar pneumonia.  She does not meet any sepsis 


criteria and does not appear septic.





Will admit to panel





Departure


Diagnosis:  


   Primary Impression:  


   Pneumonia


   Pneumonia type:  due to unspecified organism  Laterality:  bilateral  Lung 


   location:  lower lobe of lung  Qualified Codes:  J18.1 - Lobar pneumonia, 


   unspecified organism


Condition:  Stable











VIANCA GIBBS DO         Apr 15, 2019 12:11

## 2019-04-16 VITALS — HEART RATE: 78 BPM | SYSTOLIC BLOOD PRESSURE: 121 MMHG | RESPIRATION RATE: 16 BRPM | DIASTOLIC BLOOD PRESSURE: 97 MMHG

## 2019-04-16 VITALS — SYSTOLIC BLOOD PRESSURE: 141 MMHG | HEART RATE: 84 BPM | DIASTOLIC BLOOD PRESSURE: 65 MMHG | RESPIRATION RATE: 18 BRPM

## 2019-04-16 VITALS — HEART RATE: 90 BPM | RESPIRATION RATE: 20 BRPM | SYSTOLIC BLOOD PRESSURE: 138 MMHG | DIASTOLIC BLOOD PRESSURE: 60 MMHG

## 2019-04-16 VITALS — RESPIRATION RATE: 18 BRPM | HEART RATE: 81 BPM | SYSTOLIC BLOOD PRESSURE: 135 MMHG | DIASTOLIC BLOOD PRESSURE: 65 MMHG

## 2019-04-16 LAB
ADD MAN DIFF?: NO
ALANINE AMINOTRANSFERASE: 9 IU/L (ref 13–69)
ALBUMIN/GLOBULIN RATIO: 0.95
ALBUMIN: 4.1 G/DL (ref 3.3–4.9)
ALKALINE PHOSPHATASE: 89 IU/L (ref 42–121)
ANION GAP: 11 (ref 5–13)
ASPARTATE AMINO TRANSFERASE: 24 IU/L (ref 15–46)
BASOPHIL #: 0.1 10^3/UL (ref 0–0.1)
BASOPHILS %: 0.6 % (ref 0–2)
BILIRUBIN,DIRECT: 0 MG/DL (ref 0–0.2)
BILIRUBIN,TOTAL: 0.2 MG/DL (ref 0.2–1.3)
BLOOD UREA NITROGEN: 23 MG/DL (ref 7–20)
CALCIUM: 9.2 MG/DL (ref 8.4–10.2)
CARBON DIOXIDE: 26 MMOL/L (ref 21–31)
CHLORIDE: 103 MMOL/L (ref 97–110)
CREATININE: 1.02 MG/DL (ref 0.44–1)
EOSINOPHILS #: 0.2 10^3/UL (ref 0–0.5)
EOSINOPHILS %: 1.4 % (ref 0–7)
GLOBULIN: 4.3 G/DL (ref 1.3–3.2)
GLUCOSE: 46 MG/DL (ref 70–220)
HEMATOCRIT: 38.4 % (ref 37–47)
HEMOGLOBIN A1C: 8.6 % (ref 0–5.9)
HEMOGLOBIN: 12.5 G/DL (ref 12–16)
IMMATURE GRANS #M: 0.07 10^3/UL (ref 0–0.03)
IMMATURE GRANS % (M): 0.7 % (ref 0–0.43)
LYMPHOCYTES #: 2.3 10^3/UL (ref 0.8–2.9)
LYMPHOCYTES %: 21.2 % (ref 15–51)
MAGNESIUM: 1.5 MG/DL (ref 1.7–2.5)
MEAN CORPUSCULAR HEMOGLOBIN: 28.5 PG (ref 29–33)
MEAN CORPUSCULAR HGB CONC: 32.6 G/DL (ref 32–37)
MEAN CORPUSCULAR VOLUME: 87.5 FL (ref 82–101)
MEAN PLATELET VOLUME: 10.5 FL (ref 7.4–10.4)
MONOCYTE #: 0.6 10^3/UL (ref 0.3–0.9)
MONOCYTES %: 5.1 % (ref 0–11)
NEUTROPHIL #: 7.6 10^3/UL (ref 1.6–7.5)
NEUTROPHILS %: 71 % (ref 39–77)
NUCLEATED RED BLOOD CELLS #: 0 10^3/UL (ref 0–0)
NUCLEATED RED BLOOD CELLS%: 0 /100WBC (ref 0–0)
PLATELET COUNT: 370 10^3/UL (ref 140–415)
POTASSIUM: 3.7 MMOL/L (ref 3.5–5.1)
RED BLOOD COUNT: 4.39 10^6/UL (ref 4.2–5.4)
RED CELL DISTRIBUTION WIDTH: 12.7 % (ref 11.5–14.5)
SODIUM: 140 MMOL/L (ref 135–144)
TOTAL PROTEIN: 8.4 G/DL (ref 6.1–8.1)
WHITE BLOOD COUNT: 10.7 10^3/UL (ref 4.8–10.8)

## 2019-04-16 RX ADMIN — ASPIRIN 1 MG: 81 TABLET, COATED ORAL at 09:46

## 2019-04-16 RX ADMIN — OXYBUTYNIN CHLORIDE 1 MG: 5 TABLET ORAL at 09:46

## 2019-04-16 RX ADMIN — LOSARTAN POTASSIUM SCH MG: 50 TABLET, FILM COATED ORAL at 09:46

## 2019-04-16 RX ADMIN — AMLODIPINE BESYLATE SCH MG: 10 TABLET ORAL at 09:47

## 2019-04-16 RX ADMIN — BUDESONIDE 1 MG: 0.5 SUSPENSION RESPIRATORY (INHALATION) at 20:42

## 2019-04-16 RX ADMIN — IPRATROPIUM BROMIDE AND ALBUTEROL SULFATE SCH ML: .5; 3 SOLUTION RESPIRATORY (INHALATION) at 08:19

## 2019-04-16 RX ADMIN — ATORVASTATIN CALCIUM SCH MG: 10 TABLET, FILM COATED ORAL at 20:14

## 2019-04-16 RX ADMIN — INSULIN GLARGINE SCH UNITS: 100 INJECTION, SOLUTION SUBCUTANEOUS at 13:00

## 2019-04-16 RX ADMIN — ASPIRIN SCH MG: 81 TABLET, COATED ORAL at 09:46

## 2019-04-16 RX ADMIN — AMLODIPINE BESYLATE 1 MG: 10 TABLET ORAL at 09:47

## 2019-04-16 RX ADMIN — LOSARTAN POTASSIUM 1 MG: 50 TABLET ORAL at 09:46

## 2019-04-16 RX ADMIN — IPRATROPIUM BROMIDE AND ALBUTEROL SULFATE SCH ML: .5; 3 SOLUTION RESPIRATORY (INHALATION) at 13:09

## 2019-04-16 RX ADMIN — BUDESONIDE SCH MG: 0.5 SUSPENSION RESPIRATORY (INHALATION) at 20:42

## 2019-04-16 RX ADMIN — DEXTROSE 1 GM: 15 GEL ORAL at 06:57

## 2019-04-16 RX ADMIN — INSULIN ASPART 1 UNIT: 100 INJECTION, SOLUTION INTRAVENOUS; SUBCUTANEOUS at 17:55

## 2019-04-16 RX ADMIN — INSULIN ASPART 1 UNIT: 100 INJECTION, SOLUTION INTRAVENOUS; SUBCUTANEOUS at 20:16

## 2019-04-16 RX ADMIN — MAGNESIUM SULFATE HEPTAHYDRATE 1 MLS/HR: 40 INJECTION, SOLUTION INTRAVENOUS at 12:56

## 2019-04-16 RX ADMIN — METRONIDAZOLE 1 MLS/HR: 500 SOLUTION INTRAVENOUS at 21:31

## 2019-04-16 RX ADMIN — IPRATROPIUM BROMIDE AND ALBUTEROL SULFATE 1 ML: .5; 3 SOLUTION RESPIRATORY (INHALATION) at 20:42

## 2019-04-16 RX ADMIN — METOPROLOL SUCCINATE 1 MG: 50 TABLET, EXTENDED RELEASE ORAL at 09:47

## 2019-04-16 RX ADMIN — IPRATROPIUM BROMIDE AND ALBUTEROL SULFATE 1 ML: .5; 3 SOLUTION RESPIRATORY (INHALATION) at 13:09

## 2019-04-16 RX ADMIN — BUDESONIDE SCH MG: 0.5 SUSPENSION RESPIRATORY (INHALATION) at 08:19

## 2019-04-16 RX ADMIN — Medication SCH MLS/HR: at 06:00

## 2019-04-16 RX ADMIN — INSULIN GLARGINE 1 UNITS: 100 INJECTION, SOLUTION SUBCUTANEOUS at 13:00

## 2019-04-16 RX ADMIN — METRONIDAZOLE 1 MLS/HR: 500 SOLUTION INTRAVENOUS at 06:00

## 2019-04-16 RX ADMIN — ATORVASTATIN CALCIUM 1 MG: 10 TABLET, FILM COATED ORAL at 20:14

## 2019-04-16 RX ADMIN — INSULIN ASPART 1 UNIT: 100 INJECTION, SOLUTION INTRAVENOUS; SUBCUTANEOUS at 12:58

## 2019-04-16 RX ADMIN — Medication SCH MLS/HR: at 21:31

## 2019-04-16 RX ADMIN — INSULIN ASPART 1 UNIT: 100 INJECTION, SOLUTION INTRAVENOUS; SUBCUTANEOUS at 10:00

## 2019-04-16 RX ADMIN — CEFTRIAXONE 1 MLS/HR: 1 INJECTION, SOLUTION INTRAVENOUS at 14:46

## 2019-04-16 RX ADMIN — METRONIDAZOLE 1 MLS/HR: 500 SOLUTION INTRAVENOUS at 14:43

## 2019-04-16 RX ADMIN — BUDESONIDE 1 MG: 0.5 SUSPENSION RESPIRATORY (INHALATION) at 08:19

## 2019-04-16 RX ADMIN — IPRATROPIUM BROMIDE AND ALBUTEROL SULFATE SCH ML: .5; 3 SOLUTION RESPIRATORY (INHALATION) at 20:42

## 2019-04-16 RX ADMIN — OXYBUTYNIN CHLORIDE SCH MG: 5 TABLET ORAL at 09:46

## 2019-04-16 RX ADMIN — METOPROLOL SUCCINATE SCH MG: 50 TABLET, EXTENDED RELEASE ORAL at 09:47

## 2019-04-16 RX ADMIN — IPRATROPIUM BROMIDE AND ALBUTEROL SULFATE 1 ML: .5; 3 SOLUTION RESPIRATORY (INHALATION) at 08:19

## 2019-04-16 RX ADMIN — Medication SCH MLS/HR: at 14:43

## 2019-04-16 NOTE — PN
Date/Time of Note


Date/Time of Note


DATE: 4/16/19 


TIME: 10:27





Assessment/Plan


VTE Prophylaxis


Risk score (from Ns)>0 risk:  2


SCD applied (from Ns):  Yes


Pharmacological prophylaxis:  other





Lines/Catheters


IV Catheter Type (from [facility]):  Peripheral IV


Urinary Cath still in place:  No





Assessment/Plan


Assessment/Plan


1.  Aspiration pneumonia


- most likely etiology of persistent cough


- seen on CXR


- remains afebrile with nl WBC


- Will continue IV antibiotics today and transition to PO tomorrow.  If 


continues to improve, will d/c home


- DuoNeb and budesonide nebulizer





2.  Moderate AS


- seen on ECHO. EF 65%





3.  Chronic cough


-  Robitussin-DM as needed





4.  Chronic encephalopathy


-  Secondary to severe dementia


- remains pleasantly confused





5.  History of ESBL UTI


- UA ordered


- unable to determine if symptomatic given confused





6.  Hypertension


-  Continue home meds





7.  Diabetes mellitus


-  continue Lantus and ISS.  Had hypoglycemia episode at time of admission but 


resolved.  Tolerating PO well





8.  Dyslipidemia


-  Continue home medication





9.  Disposition


- PT/OT ordered


- Will transition to PO antibiotics tomorrow and if remains stable, d/c on PO 


antibiotic course


Result Diagram:  


4/16/19 0449                                                                    


           4/16/19 0448





Results 24hrs





Laboratory Tests


Test
                 4/15/19
12:22  4/15/19
14:44  4/15/19
17:59  4/15/19
18:15


White Blood Count            9.5  #


Red Blood Count              4.45


Hemoglobin                   12.6


Hematocrit                   39.3


Mean Corpuscular             88.3


Volume


Mean Corpuscular            28.3  L


Hemoglobin


Mean Corpuscular            32.1  
  
              
              



Hemoglobin
Concent


Red Cell                     13.1


Distribution Width


Platelet Count                354


Mean Platelet Volume         10.2


Immature                   0.600  H


Granulocytes %


Neutrophils %                72.3


Lymphocytes %                21.5


Monocytes %                   3.8


Eosinophils %                 1.4


Basophils %                   0.4


Nucleated Red Blood           0.0


Cells %


Immature                   0.060  H


Granulocytes #


Neutrophils #                 6.9


Lymphocytes #                 2.0


Monocytes #                   0.4


Eosinophils #                 0.1


Basophils #                   0.0


Nucleated Red Blood           0.0


Cells #


Sodium Level                  140


Potassium Level               4.3


Chloride Level                101


Carbon Dioxide Level           23


Anion Gap                     16  H


Blood Urea Nitrogen           27  H


Creatinine                  1.01  H


Est Glomerular          
            
              
              



Filtrat Rate
mL/min


Glucose Level                295  H


Calcium Level                 9.4


Total Bilirubin               0.3


Direct Bilirubin             0.00


Indirect Bilirubin            0.3


Aspartate Amino               18  
  
              
              



Transf
(AST/SGOT)


Alanine               < 6  L
        
              
              



Aminotransferase
(AL


T/SGPT)


Alkaline Phosphatase           96


Troponin I            < 0.012


B-Type Natriuretic           577  H


Peptide


Total Protein                9.0  H


Albumin                       4.4


Globulin                    4.60  H


Albumin/Globulin             0.95


Ratio


Bedside Glucose                              145           45  *L          214


Test
                 4/15/19
18:29  4/15/19
21:11  4/16/19
04:48  4/16/19
04:49


Bedside Glucose               168             89


Sodium Level                                                140


Potassium Level                                             3.7


Chloride Level                                              103


Carbon Dioxide Level                                         26


Anion Gap                                                    11


Blood Urea Nitrogen                                         23  H


Creatinine                                                1.02  H


Est Glomerular        
              
                
            



Filtrat Rate
mL/min


Glucose Level                                             46  #*L


Calcium Level                                               9.2


Magnesium Level                                            1.5  L


Total Bilirubin                                             0.2


Direct Bilirubin                                           0.00


Indirect Bilirubin                                          0.2


Aspartate Amino       
              
                      24  
  



Transf
(AST/SGOT)


Alanine               
              
                      9  L
  



Aminotransferase
(AL


T/SGPT)


Alkaline Phosphatase                                         89


Total Protein                                              8.4  H


Albumin                                                     4.1


Globulin                                                  4.30  H


Albumin/Globulin                                           0.95


Ratio


White Blood Count                                                         10.7


Red Blood Count                                                           4.39


Hemoglobin                                                                12.5


Hematocrit                                                                38.4


Mean Corpuscular                                                          87.5


Volume


Mean Corpuscular                                                         28.5  L


Hemoglobin


Mean Corpuscular      
              
              
                    32.6  



Hemoglobin
Concent


Red Cell                                                                  12.7


Distribution Width


Platelet Count                                                             370


Mean Platelet Volume                                                     10.5  H


Immature                                                                0.700  H


Granulocytes %


Neutrophils %                                                             71.0


Lymphocytes %                                                             21.2


Monocytes %                                                                5.1


Eosinophils %                                                              1.4


Basophils %                                                                0.6


Nucleated Red Blood                                                        0.0


Cells %


Immature                                                                0.070  H


Granulocytes #


Neutrophils #                                                             7.6  H


Lymphocytes #                                                              2.3


Monocytes #                                                                0.6


Eosinophils #                                                              0.2


Basophils #                                                                0.1


Nucleated Red Blood                                                        0.0


Cells #


Hemoglobin A1c                                                            8.6  H


Test
                 4/16/19
07:13  4/16/19
07:31  4/16/19
09:49  



Bedside Glucose                98            103            173








Subjective


24 Hr Interval Summary


Free Text/Dictation


Patient doing well and still with slight cough.  No acute overnight events.  


Pleasantly confused.





Exam/Review of Systems


Exam


Vitals





Vital Signs


  Date      Temp  Pulse  Resp  B/P (MAP)   Pulse Ox  O2          O2 Flow    FiO2


Time                                                 Delivery    Rate


   4/16/19           79    18                    96                           21


     08:19


   4/16/19  97.5                   121/97            Room Air


     07:35                          (105)








Intake and Output





4/15/19


4/15/19


4/16/19





1515:00


23:00


07:00





IntakeIntake Total


1250 ml


100 ml





OutputOutput Total


1 ml





BalanceBalance


1249 ml


100 ml











Exam


General: Patient is laying in bed. no acute distress. pleasantly confused


Neck: Supple, nontender, midline


Respiratory: Coarse to auscultation bilaterally. no wheezing


Cardiovascular: regular rate and rhythm, no obvious murmurs


Gastrointestinal: soft, non-tender to palpation, nondistended, bowel sounds 


heard.


Neurological: Moves all extremities spontaneously


Skin: No new skin lesions





Results


Results 24hrs





Laboratory Tests


Test
                 4/15/19
12:22  4/15/19
14:44  4/15/19
17:59  4/15/19
18:15


White Blood Count            9.5  #


Red Blood Count              4.45


Hemoglobin                   12.6


Hematocrit                   39.3


Mean Corpuscular             88.3


Volume


Mean Corpuscular            28.3  L


Hemoglobin


Mean Corpuscular            32.1  
  
              
              



Hemoglobin
Concent


Red Cell                     13.1


Distribution Width


Platelet Count                354


Mean Platelet Volume         10.2


Immature                   0.600  H


Granulocytes %


Neutrophils %                72.3


Lymphocytes %                21.5


Monocytes %                   3.8


Eosinophils %                 1.4


Basophils %                   0.4


Nucleated Red Blood           0.0


Cells %


Immature                   0.060  H


Granulocytes #


Neutrophils #                 6.9


Lymphocytes #                 2.0


Monocytes #                   0.4


Eosinophils #                 0.1


Basophils #                   0.0


Nucleated Red Blood           0.0


Cells #


Sodium Level                  140


Potassium Level               4.3


Chloride Level                101


Carbon Dioxide Level           23


Anion Gap                     16  H


Blood Urea Nitrogen           27  H


Creatinine                  1.01  H


Est Glomerular          
            
              
              



Filtrat Rate
mL/min


Glucose Level                295  H


Calcium Level                 9.4


Total Bilirubin               0.3


Direct Bilirubin             0.00


Indirect Bilirubin            0.3


Aspartate Amino               18  
  
              
              



Transf
(AST/SGOT)


Alanine               < 6  L
        
              
              



Aminotransferase
(AL


T/SGPT)


Alkaline Phosphatase           96


Troponin I            < 0.012


B-Type Natriuretic           577  H


Peptide


Total Protein                9.0  H


Albumin                       4.4


Globulin                    4.60  H


Albumin/Globulin             0.95


Ratio


Bedside Glucose                              145           45  *L          214


Test
                 4/15/19
18:29  4/15/19
21:11  4/16/19
04:48  4/16/19
04:49


Bedside Glucose               168             89


Sodium Level                                                140


Potassium Level                                             3.7


Chloride Level                                              103


Carbon Dioxide Level                                         26


Anion Gap                                                    11


Blood Urea Nitrogen                                         23  H


Creatinine                                                1.02  H


Est Glomerular        
              
                
            



Filtrat Rate
mL/min


Glucose Level                                             46  #*L


Calcium Level                                               9.2


Magnesium Level                                            1.5  L


Total Bilirubin                                             0.2


Direct Bilirubin                                           0.00


Indirect Bilirubin                                          0.2


Aspartate Amino       
              
                      24  
  



Transf
(AST/SGOT)


Alanine               
              
                      9  L
  



Aminotransferase
(AL


T/SGPT)


Alkaline Phosphatase                                         89


Total Protein                                              8.4  H


Albumin                                                     4.1


Globulin                                                  4.30  H


Albumin/Globulin                                           0.95


Ratio


White Blood Count                                                         10.7


Red Blood Count                                                           4.39


Hemoglobin                                                                12.5


Hematocrit                                                                38.4


Mean Corpuscular                                                          87.5


Volume


Mean Corpuscular                                                         28.5  L


Hemoglobin


Mean Corpuscular      
              
              
                    32.6  



Hemoglobin
Concent


Red Cell                                                                  12.7


Distribution Width


Platelet Count                                                             370


Mean Platelet Volume                                                     10.5  H


Immature                                                                0.700  H


Granulocytes %


Neutrophils %                                                             71.0


Lymphocytes %                                                             21.2


Monocytes %                                                                5.1


Eosinophils %                                                              1.4


Basophils %                                                                0.6


Nucleated Red Blood                                                        0.0


Cells %


Immature                                                                0.070  H


Granulocytes #


Neutrophils #                                                             7.6  H


Lymphocytes #                                                              2.3


Monocytes #                                                                0.6


Eosinophils #                                                              0.2


Basophils #                                                                0.1


Nucleated Red Blood                                                        0.0


Cells #


Hemoglobin A1c                                                            8.6  H


Test
                 4/16/19
07:13  4/16/19
07:31  4/16/19
09:49  



Bedside Glucose                98            103            173








Medications


Medication





Current Medications


Amlodipine Besylate (Norvasc) 10 mg DAILY PO  Last administered on 4/16/19at 


09:47; Admin Dose 10 MG;  Start 4/16/19 at 09:00


Aspirin (Halfprin) 81 mg DAILY PO  Last administered on 4/16/19at 09:46; Admin 


Dose 81 MG;  Start 4/16/19 at 09:00


Losartan Potassium (Cozaar) 100 mg DAILY PO  Last administered on 4/16/19at 


09:46; Admin Dose 100 MG;  Start 4/16/19 at 09:00


Metoprolol Succinate (Toprol Xl) 50 mg DAILY PO  Last administered on 4/16/19at 


09:47; Admin Dose 50 MG;  Start 4/16/19 at 09:00


Oxybutynin Chloride (Ditropan) 5 mg DAILY PO  Last administered on 4/16/19at 0


9:46; Admin Dose 5 MG;  Start 4/16/19 at 09:00


Atorvastatin Calcium (Lipitor) 10 mg HS PO  Last administered on 4/15/19at 


21:11; Admin Dose 10 MG;  Start 4/15/19 at 21:00


Ondansetron HCl (Zofran Inj) 4 mg BRIDGE ORDER PRN IV NAUSEA/VOMITING;  Start 


4/15/19 at 14:00;  Stop 4/16/19 at 13:59


Acetaminophen (Tylenol Tab) 650 mg ER BRIDGE PRN PO .MILD PAIN 1-3 OR TEMP;  


Start 4/15/19 at 14:00;  Stop 4/16/19 at 13:59


Albuterol/ Ipratropium (Duoneb) 3 ml Q6HWA RESP  THERAPY HHN  Last administered 


on 4/16/19at 08:19; Admin Dose 3 ML;  Start 4/15/19 at 14:00


Albuterol/ Ipratropium (Duoneb) 3 ml Q2H RESP THERAPY  PRN HHN shortness of 


breath;  Start 4/15/19 at 14:00


Budesonide (Pulmicort (Neb)) 0.5 mg BID RESP  THERAPY HHN  Last administered on 


4/16/19at 08:19; Admin Dose 0.5 MG;  Start 4/15/19 at 20:00


IV Flush (NS 3 ml) 3 ml PER PROTOCOL IV ;  Start 4/15/19 at 14:00


Ondansetron HCl (Zofran Inj) 4 mg Q6H  PRN IV NAUSEA/VOMITING;  Start 4/15/19 at


14:00


Acetaminophen (Tylenol Tab) 650 mg Q6H  PRN PO .PAIN 1-3 OR TEMP;  Start 4/15/19


at 14:00


Guaifenesin/ Dextromethorphan (Robitussin Dm Liquid Cup) 5 ml Q4H  PRN PO cough;


 Start 4/15/19 at 14:00


Ceftriaxone Sodium 50 ml @  100 mls/hr Q24H IVPB ;  Start 4/16/19 at 14:00


Metronidazole 100 ml @  100 mls/hr Q8 IVPB  Last administered on 4/15/19at 


21:16; Admin Dose 100 MLS/HR;  Start 4/15/19 at 14:00


Diagnostic Test (Pha) (Accu-Chek) 1 ea 02 XX ;  Start 4/16/19 at 02:00


Insulin Glargine (Lantus) 11 units DAILY@0800 SC ;  Start 4/16/19 at 08:00


Insulin Aspart (Novolog Insulin Pen) NOVOLOG *MILD* ALGORITHM WITH MEALS  


BEDTIME SC  Last administered on 4/16/19at 10:00; Admin Dose 1 UNIT;  Start 


4/15/19 at 18:00


Miscellaneous Information 1 ea NOTE XX ;  Start 4/15/19 at 14:00


Glucose (Glutose) 15 gm Q15M  PRN PO DECREASED GLUCOSE;  Start 4/15/19 at 14:00


Glucose (Glutose) 22.5 gm Q15M  PRN PO DECREASED GLUCOSE Last administered on 


4/16/19at 06:57; Admin Dose 22.5 GM;  Start 4/15/19 at 14:00


Dextrose (D50w Syringe) 25 ml Q15M  PRN IV DECREASED GLUCOSE;  Start 4/15/19 at 


14:00


Dextrose (D50w Syringe) 50 ml Q15M  PRN IV DECREASED GLUCOSE Last administered 


on 4/15/19at 18:01; Admin Dose 50 ML;  Start 4/15/19 at 14:00


Glucagon (Glucagen) 1 mg Q15M  PRN IM DECREASED GLUCOSE;  Start 4/15/19 at 14:00


Glucose (Glutose) 15 gm Q15M  PRN BUCCAL DECREASED GLUCOSE;  Start 4/15/19 at 


14:00


Magnesium Sulfate 50 ml @ 25 mls/hr ONCE  ONCE IVPB ;  Start 4/16/19 at 11:00;  


Stop 4/16/19 at 12:59











MARILU COLLINS MD                 Apr 16, 2019 10:27

## 2019-04-16 NOTE — PSY
Date/Time of Note


Date/Time of Note


DATE: 4/16/19 


TIME: 17:26





Psychiatric Subjective Eval


Consent


Pt consented to telemedicine:  No





Subjective Evaluation


Patient location:  inpatient


Chief Complaint:  cough and congestion for the past few days. with no fevers. no


distress.


History of present illness


Patient is 83year old female with a past medical history  diabetes, anemia, 


hypertension, dyslipidemia, and cough.  Patient is disorganized and confused 


cannot process information patient is in poor contact with reality.  However no 


agitation noted at the moment


Past psychiatric history


History of dementia


Medical history


Problems


Medical Problems:


(1) Dehydration


Status: Acute  





(2) Diabetes mellitus with hyperglycemia


Status: Acute  





(3) Generalized weakness


Status: Acute  





(4) Hyperkalemia


Status: Acute  





(5) Pneumonia


Status: Acute  





(6) Renal insufficiency


Status: Acute  





(7) Sepsis secondary to UTI


Status: Acute  





(8) Urinary tract infection


Status: Acute  








Allergies:  


Coded Allergies:  


     No Known Allergy (Unverified , 4/15/19)





Substance Abuse


Substance abuse history:  No


Prior substance abuse treatmen:  No





Social History


Marital status:  other


DPA/Conservatorship:  No





Psychiatric Objective Eval


Review of Systems:


Review of Systems:  Not Applicable





Physical Examination:


Physical Examination:  Not Applicable


Sleep:  Other





Mental Status Examination:


Appearance:  Poor Hygiene


Eye Contact:  Poor


Behavior:  Friendly


Speech:  Disorganized


AFFECT:  Constricted


Though Process:  Loose


Orientation:  x1


Cognition:  Alert


Attention Span:  Other


Laboratory Results





Laboratory Tests


Test
                4/15/19
12:22  4/15/19
14:44  4/15/19
17:59   4/15/19
18:15


White Blood Count     9.5 10^3/ul


Red Blood Count      4.45 10^6/ul


Hemoglobin              12.6 g/dl


Hematocrit                 39.3 %


Mean Corpuscular          88.3 fl


Volume


Mean Corpuscular          28.3 pg


Hemoglobin


Mean Corpuscular       32.1 g/dl 
  
              
              



Hemoglobin
Concent


Red Cell                   13.1 %


Distribution Width


Platelet Count        354 10^3/UL


Mean Platelet             10.2 fl


Volume


Immature                  0.600 %


Granulocytes %


Neutrophils %              72.3 %


Lymphocytes %              21.5 %


Monocytes %                 3.8 %


Eosinophils %               1.4 %


Basophils %                 0.4 %


Nucleated Red         0.0 /100WBC


Blood Cells %


Immature            0.060 10^3/ul


Granulocytes #


Neutrophils #         6.9 10^3/ul


Lymphocytes #         2.0 10^3/ul


Monocytes #           0.4 10^3/ul


Eosinophils #         0.1 10^3/ul


Basophils #           0.0 10^3/ul


Nucleated Red         0.0 10^3/ul


Blood Cells #


Sodium Level           140 mmol/L


Potassium Level        4.3 mmol/L


Chloride Level         101 mmol/L


Carbon Dioxide          23 mmol/L


Level


Anion Gap                      16


Blood Urea               27 mg/dl


Nitrogen


Creatinine             1.01 mg/dl


Est Glomerular       mL/min 
       
              
              



Filtrat


Rate
mL/min


Glucose Level           295 mg/dl


Calcium Level           9.4 mg/dl


Total Bilirubin         0.3 mg/dl


Direct Bilirubin       0.00 mg/dl


Indirect Bilirubin      0.3 mg/dl


Aspartate Amino          18 IU/L 
  
              
              



Transf
(AST/SGOT)


Alanine             < 6 IU/L 
      
              
              



Aminotransferase
(


ALT/SGPT)


Alkaline                  96 IU/L


Phosphatase


Troponin I          < 0.012 ng/ml


B-Type Natriuretic      577 PG/ML


Peptide


Total Protein            9.0 g/dl


Albumin                  4.4 g/dl


Globulin                4.60 g/dl


Albumin/Globulin             0.95


Ratio


Bedside Glucose                        145 mg/dL       45 mg/dL       214 mg/dL


Test
                4/15/19
18:29  4/15/19
21:11  4/16/19
04:48   4/16/19
04:49


Bedside Glucose         168 mg/dL       89 mg/dL


Sodium Level                                         140 mmol/L


Potassium Level                                      3.7 mmol/L


Chloride Level                                       103 mmol/L


Carbon Dioxide                                        26 mmol/L


Level


Anion Gap                                                    11


Blood Urea                                             23 mg/dl


Nitrogen


Creatinine                                           1.02 mg/dl


Est Glomerular      
               
               mL/min 
      



Filtrat


Rate
mL/min


Glucose Level                                          46 mg/dl


Calcium Level                                         9.2 mg/dl


Magnesium Level                                       1.5 mg/dl


Total Bilirubin                                       0.2 mg/dl


Direct Bilirubin                                     0.00 mg/dl


Indirect Bilirubin                                    0.2 mg/dl


Aspartate Amino     
               
                  24 IU/L 
  



Transf
(AST/SGOT)


Alanine             
               
                   9 IU/L 
  



Aminotransferase
(


ALT/SGPT)


Alkaline                                                89 IU/L


Phosphatase


Total Protein                                          8.4 g/dl


Albumin                                                4.1 g/dl


Globulin                                              4.30 g/dl


Albumin/Globulin                                           0.95


Ratio


White Blood Count                                                  10.7 10^3/ul


Red Blood Count                                                    4.39 10^6/ul


Hemoglobin                                                            12.5 g/dl


Hematocrit                                                               38.4 %


Mean Corpuscular                                                        87.5 fl


Volume


Mean Corpuscular                                                        28.5 pg


Hemoglobin


Mean Corpuscular    
               
              
                 32.6 g/dl 



Hemoglobin
Concent


Red Cell                                                                 12.7 %


Distribution Width


Platelet Count                                                      370 10^3/UL


Mean Platelet                                                           10.5 fl


Volume


Immature                                                                0.700 %


Granulocytes %


Neutrophils %                                                            71.0 %


Lymphocytes %                                                            21.2 %


Monocytes %                                                               5.1 %


Eosinophils %                                                             1.4 %


Basophils %                                                               0.6 %


Nucleated Red                                                       0.0 /100WBC


Blood Cells %


Immature                                                          0.070 10^3/ul


Granulocytes #


Neutrophils #                                                       7.6 10^3/ul


Lymphocytes #                                                       2.3 10^3/ul


Monocytes #                                                         0.6 10^3/ul


Eosinophils #                                                       0.2 10^3/ul


Basophils #                                                         0.1 10^3/ul


Nucleated Red                                                       0.0 10^3/ul


Blood Cells #


Hemoglobin A1c                                                            8.6 %


Test
                4/16/19
07:13  4/16/19
07:31  4/16/19
09:49   4/16/19
12:56


Bedside Glucose          98 mg/dL      103 mg/dL      173 mg/dL       259 mg/dL








Assessment and Plan


Assessment/Diagnosis


Diagnosis


Dementia patient history with behavioral disturbance





Recommendation/Plan


Discharge Disposition:  Other


Legal Status:  Voluntary (Does not meet criteria for 5150 hold)











EJ HOLCOMB NP          Apr 16, 2019 17:31

## 2019-04-17 VITALS — SYSTOLIC BLOOD PRESSURE: 122 MMHG | RESPIRATION RATE: 20 BRPM | HEART RATE: 77 BPM | DIASTOLIC BLOOD PRESSURE: 58 MMHG

## 2019-04-17 VITALS — HEART RATE: 75 BPM | DIASTOLIC BLOOD PRESSURE: 62 MMHG | RESPIRATION RATE: 18 BRPM | SYSTOLIC BLOOD PRESSURE: 129 MMHG

## 2019-04-17 VITALS — HEART RATE: 79 BPM | SYSTOLIC BLOOD PRESSURE: 150 MMHG | DIASTOLIC BLOOD PRESSURE: 66 MMHG | RESPIRATION RATE: 18 BRPM

## 2019-04-17 VITALS — HEART RATE: 87 BPM | DIASTOLIC BLOOD PRESSURE: 77 MMHG | RESPIRATION RATE: 18 BRPM | SYSTOLIC BLOOD PRESSURE: 163 MMHG

## 2019-04-17 LAB
ADD MAN DIFF?: NO
ALBUMIN: 3.3 G/DL (ref 3.3–4.9)
ANION GAP: 7 (ref 5–13)
BASOPHIL #: 0.1 10^3/UL (ref 0–0.1)
BASOPHILS %: 0.7 % (ref 0–2)
BLOOD UREA NITROGEN: 25 MG/DL (ref 7–20)
CALCIUM: 8.7 MG/DL (ref 8.4–10.2)
CARBON DIOXIDE: 23 MMOL/L (ref 21–31)
CHLORIDE: 107 MMOL/L (ref 97–110)
CREATININE: 1.04 MG/DL (ref 0.44–1)
EOSINOPHILS #: 0.1 10^3/UL (ref 0–0.5)
EOSINOPHILS %: 1.5 % (ref 0–7)
GLUCOSE: 195 MG/DL (ref 70–220)
HEMATOCRIT: 33.5 % (ref 37–47)
HEMOGLOBIN: 10.9 G/DL (ref 12–16)
IMMATURE GRANS #M: 0.07 10^3/UL (ref 0–0.03)
IMMATURE GRANS % (M): 0.7 % (ref 0–0.43)
LYMPHOCYTES #: 2.1 10^3/UL (ref 0.8–2.9)
LYMPHOCYTES %: 21.8 % (ref 15–51)
MAGNESIUM: 1.9 MG/DL (ref 1.7–2.5)
MEAN CORPUSCULAR HEMOGLOBIN: 28.2 PG (ref 29–33)
MEAN CORPUSCULAR HGB CONC: 32.5 G/DL (ref 32–37)
MEAN CORPUSCULAR VOLUME: 86.8 FL (ref 82–101)
MEAN PLATELET VOLUME: 10.5 FL (ref 7.4–10.4)
MONOCYTE #: 0.6 10^3/UL (ref 0.3–0.9)
MONOCYTES %: 6.4 % (ref 0–11)
NEUTROPHIL #: 6.5 10^3/UL (ref 1.6–7.5)
NEUTROPHILS %: 68.9 % (ref 39–77)
NUCLEATED RED BLOOD CELLS #: 0 10^3/UL (ref 0–0)
NUCLEATED RED BLOOD CELLS%: 0 /100WBC (ref 0–0)
PHOSPHORUS: 3.9 MG/DL (ref 2.5–4.9)
PLATELET COUNT: 349 10^3/UL (ref 140–415)
POTASSIUM: 4.3 MMOL/L (ref 3.5–5.1)
RED BLOOD COUNT: 3.86 10^6/UL (ref 4.2–5.4)
RED CELL DISTRIBUTION WIDTH: 12.6 % (ref 11.5–14.5)
SODIUM: 137 MMOL/L (ref 135–144)
WHITE BLOOD COUNT: 9.5 10^3/UL (ref 4.8–10.8)

## 2019-04-17 RX ADMIN — AMOXICILLIN AND CLAVULANATE POTASSIUM SCH MG: 875; 125 TABLET, FILM COATED ORAL at 08:27

## 2019-04-17 RX ADMIN — OXYBUTYNIN CHLORIDE 1 MG: 5 TABLET ORAL at 08:27

## 2019-04-17 RX ADMIN — AMOXICILLIN AND CLAVULANATE POTASSIUM 1 MG: 875; 125 TABLET, FILM COATED ORAL at 08:27

## 2019-04-17 RX ADMIN — ASPIRIN SCH MG: 81 TABLET, COATED ORAL at 08:27

## 2019-04-17 RX ADMIN — INSULIN ASPART 1 UNIT: 100 INJECTION, SOLUTION INTRAVENOUS; SUBCUTANEOUS at 08:31

## 2019-04-17 RX ADMIN — IPRATROPIUM BROMIDE AND ALBUTEROL SULFATE SCH ML: .5; 3 SOLUTION RESPIRATORY (INHALATION) at 13:52

## 2019-04-17 RX ADMIN — GUAIFENESIN AND DEXTROMETHORPHAN 1 ML: 100; 10 SYRUP ORAL at 21:51

## 2019-04-17 RX ADMIN — INSULIN GLARGINE 1 UNITS: 100 INJECTION, SOLUTION SUBCUTANEOUS at 08:31

## 2019-04-17 RX ADMIN — AMLODIPINE BESYLATE 1 MG: 10 TABLET ORAL at 08:27

## 2019-04-17 RX ADMIN — LOSARTAN POTASSIUM SCH MG: 50 TABLET, FILM COATED ORAL at 08:26

## 2019-04-17 RX ADMIN — BUDESONIDE SCH MG: 0.5 SUSPENSION RESPIRATORY (INHALATION) at 07:28

## 2019-04-17 RX ADMIN — ATORVASTATIN CALCIUM 1 MG: 10 TABLET, FILM COATED ORAL at 21:43

## 2019-04-17 RX ADMIN — INSULIN ASPART 1 UNIT: 100 INJECTION, SOLUTION INTRAVENOUS; SUBCUTANEOUS at 21:48

## 2019-04-17 RX ADMIN — METOPROLOL SUCCINATE 1 MG: 50 TABLET, EXTENDED RELEASE ORAL at 08:26

## 2019-04-17 RX ADMIN — IPRATROPIUM BROMIDE AND ALBUTEROL SULFATE 1 ML: .5; 3 SOLUTION RESPIRATORY (INHALATION) at 07:28

## 2019-04-17 RX ADMIN — ATORVASTATIN CALCIUM SCH MG: 10 TABLET, FILM COATED ORAL at 21:43

## 2019-04-17 RX ADMIN — INSULIN GLARGINE SCH UNITS: 100 INJECTION, SOLUTION SUBCUTANEOUS at 08:31

## 2019-04-17 RX ADMIN — IPRATROPIUM BROMIDE AND ALBUTEROL SULFATE SCH ML: .5; 3 SOLUTION RESPIRATORY (INHALATION) at 20:00

## 2019-04-17 RX ADMIN — METOPROLOL SUCCINATE SCH MG: 50 TABLET, EXTENDED RELEASE ORAL at 08:26

## 2019-04-17 RX ADMIN — IPRATROPIUM BROMIDE AND ALBUTEROL SULFATE SCH ML: .5; 3 SOLUTION RESPIRATORY (INHALATION) at 07:28

## 2019-04-17 RX ADMIN — INSULIN ASPART 1 UNIT: 100 INJECTION, SOLUTION INTRAVENOUS; SUBCUTANEOUS at 17:54

## 2019-04-17 RX ADMIN — OXYBUTYNIN CHLORIDE SCH MG: 5 TABLET ORAL at 08:27

## 2019-04-17 RX ADMIN — AMLODIPINE BESYLATE SCH MG: 10 TABLET ORAL at 08:27

## 2019-04-17 RX ADMIN — IPRATROPIUM BROMIDE AND ALBUTEROL SULFATE 1 ML: .5; 3 SOLUTION RESPIRATORY (INHALATION) at 13:52

## 2019-04-17 RX ADMIN — BUDESONIDE SCH MG: 0.5 SUSPENSION RESPIRATORY (INHALATION) at 21:40

## 2019-04-17 RX ADMIN — BUDESONIDE 1 MG: 0.5 SUSPENSION RESPIRATORY (INHALATION) at 21:40

## 2019-04-17 RX ADMIN — AMOXICILLIN AND CLAVULANATE POTASSIUM 1 MG: 875; 125 TABLET, FILM COATED ORAL at 21:43

## 2019-04-17 RX ADMIN — LOSARTAN POTASSIUM 1 MG: 50 TABLET ORAL at 08:26

## 2019-04-17 RX ADMIN — IPRATROPIUM BROMIDE AND ALBUTEROL SULFATE 1 ML: .5; 3 SOLUTION RESPIRATORY (INHALATION) at 20:00

## 2019-04-17 RX ADMIN — ASPIRIN 1 MG: 81 TABLET, COATED ORAL at 08:27

## 2019-04-17 RX ADMIN — BUDESONIDE 1 MG: 0.5 SUSPENSION RESPIRATORY (INHALATION) at 07:28

## 2019-04-17 RX ADMIN — AMOXICILLIN AND CLAVULANATE POTASSIUM SCH MG: 875; 125 TABLET, FILM COATED ORAL at 21:43

## 2019-04-17 RX ADMIN — INSULIN ASPART 1 UNIT: 100 INJECTION, SOLUTION INTRAVENOUS; SUBCUTANEOUS at 14:05

## 2019-04-17 NOTE — PN
Date/Time of Note


Date/Time of Note


DATE: 4/17/19 


TIME: 14:02





Assessment/Plan


VTE Prophylaxis


Risk score (from Ns)>0 risk:  6


SCD applied (from Nsg):  Yes


Pharmacological prophylaxis:  other





Lines/Catheters


IV Catheter Type (from Nrsg):  Saline Lock


Urinary Cath still in place:  No





Assessment/Plan


Assessment/Plan


1.  Aspiration pneumonia


- Transitioned to PO antibiotics this am.  Will monitor for improvement in 


respiratory status


- most likely etiology of persistent cough


- seen on CXR


- remains afebrile with nl WBC


- DuoNeb and budesonide nebulizer





2.  Moderate AS


- seen on ECHO. EF 65%





3.  Chronic cough


-  Robitussin-DM as needed





4.  Chronic encephalopathy


-  Secondary to severe dementia


-  remains pleasantly confused





5.  History of ESBL UTI


- UA ordered


- unable to determine if symptomatic given confused





6.  Hypertension


-  Continue home meds





7.  Diabetes mellitus


-  continue Lantus and ISS. 





8.  Dyslipidemia


-  Continue home medication





9.  Disposition


- Awaiting evaluation for OOB with PT to determine safety.  Will monitor also on


PO antibiotics and if remains stable, d/c tomorrow am


Result Diagram:  


4/17/19 0453                                                                    


           4/17/19 0454





Results 24hrs





Laboratory Tests


Test
                 4/16/19
17:51  4/16/19
20:15  4/17/19
04:53  4/17/19
04:54


Bedside Glucose              230  H          180


White Blood Count                                           9.5


Red Blood Count                                           3.86  L


Hemoglobin                                                10.9  L


Hematocrit                                                33.5  L


Mean Corpuscular                                           86.8


Volume


Mean Corpuscular                                          28.2  L


Hemoglobin


Mean Corpuscular      
              
                    32.5  
  



Hemoglobin
Concent


Red Cell                                                   12.6


Distribution Width


Platelet Count                                              349


Mean Platelet Volume                                      10.5  H


Immature                                                 0.700  H


Granulocytes %


Neutrophils %                                              68.9


Lymphocytes %                                              21.8


Monocytes %                                                 6.4


Eosinophils %                                               1.5


Basophils %                                                 0.7


Nucleated Red Blood                                         0.0


Cells %


Immature                                                 0.070  H


Granulocytes #


Neutrophils #                                               6.5


Lymphocytes #                                               2.1


Monocytes #                                                 0.6


Eosinophils #                                               0.1


Basophils #                                                 0.1


Nucleated Red Blood                                         0.0


Cells #


Sodium Level                                                               137


Potassium Level                                                            4.3


Chloride Level                                                             107


Carbon Dioxide Level                                                        23


Anion Gap                                                                    7


Blood Urea Nitrogen                                                        25  H


Creatinine                                                               1.04  H


Glucose Level                                                             195  #


Calcium Level                                                              8.7


Phosphorus Level                                                           3.9


Magnesium Level                                                            1.9


Albumin                                                                    3.3


Test
                 4/17/19
08:25  4/17/19
13:56  
              



Bedside Glucose               214           339  H








Subjective


24 Hr Interval Summary


Free Text/Dictation


Patient still with productive cough.  Appears uncomfortable.  Still pleasantly 


confused which is baseline.





Exam/Review of Systems


Exam


Vitals





Vital Signs


  Date      Temp  Pulse  Resp  B/P (MAP)   Pulse Ox  O2          O2 Flow    FiO2


Time                                                 Delivery    Rate


   4/17/19           79    18                    96                           21


     13:58


   4/17/19  97.5                   122/58


     13:06                           (79)


   4/16/19                                           Room Air


     07:35








Intake and Output





4/16/19 4/16/19 4/17/19





1515:00


23:00


07:00





IntakeIntake Total


290 ml


1008 ml





BalanceBalance


290 ml


1008 ml











Exam


General: Patient is laying in bed. productive cough. pleasantly confused


Neck: Supple, nontender, midline


Respiratory: Coarse to auscultation bilaterally. no wheezing


Cardiovascular: regular rate and rhythm, no obvious murmurs


Gastrointestinal: soft, non-tender to palpation, nondistended, bowel sounds 


heard.


Neurological: Moves all extremities spontaneously


Skin: No new skin lesions





Results


Results 24hrs





Laboratory Tests


Test
                 4/16/19
17:51  4/16/19
20:15  4/17/19
04:53  4/17/19
04:54


Bedside Glucose              230  H          180


White Blood Count                                           9.5


Red Blood Count                                           3.86  L


Hemoglobin                                                10.9  L


Hematocrit                                                33.5  L


Mean Corpuscular                                           86.8


Volume


Mean Corpuscular                                          28.2  L


Hemoglobin


Mean Corpuscular      
              
                    32.5  
  



Hemoglobin
Concent


Red Cell                                                   12.6


Distribution Width


Platelet Count                                              349


Mean Platelet Volume                                      10.5  H


Immature                                                 0.700  H


Granulocytes %


Neutrophils %                                              68.9


Lymphocytes %                                              21.8


Monocytes %                                                 6.4


Eosinophils %                                               1.5


Basophils %                                                 0.7


Nucleated Red Blood                                         0.0


Cells %


Immature                                                 0.070  H


Granulocytes #


Neutrophils #                                               6.5


Lymphocytes #                                               2.1


Monocytes #                                                 0.6


Eosinophils #                                               0.1


Basophils #                                                 0.1


Nucleated Red Blood                                         0.0


Cells #


Sodium Level                                                               137


Potassium Level                                                            4.3


Chloride Level                                                             107


Carbon Dioxide Level                                                        23


Anion Gap                                                                    7


Blood Urea Nitrogen                                                        25  H


Creatinine                                                               1.04  H


Glucose Level                                                             195  #


Calcium Level                                                              8.7


Phosphorus Level                                                           3.9


Magnesium Level                                                            1.9


Albumin                                                                    3.3


Test
                 4/17/19
08:25  4/17/19
13:56  
              



Bedside Glucose               214           339  H








Medications


Medication





Current Medications


Amlodipine Besylate (Norvasc) 10 mg DAILY PO  Last administered on 4/17/19at 


08:27; Admin Dose 10 MG;  Start 4/16/19 at 09:00


Aspirin (Halfprin) 81 mg DAILY PO  Last administered on 4/17/19at 08:27; Admin 


Dose 81 MG;  Start 4/16/19 at 09:00


Losartan Potassium (Cozaar) 100 mg DAILY PO  Last administered on 4/17/19at 


08:26; Admin Dose 100 MG;  Start 4/16/19 at 09:00


Metoprolol Succinate (Toprol Xl) 50 mg DAILY PO  Last administered on 4/17/19at 


08:26; Admin Dose 50 MG;  Start 4/16/19 at 09:00


Oxybutynin Chloride (Ditropan) 5 mg DAILY PO  Last administered on 4/17/19at 


08:27; Admin Dose 5 MG;  Start 4/16/19 at 09:00


Atorvastatin Calcium (Lipitor) 10 mg HS PO  Last administered on 4/16/19at 


20:14; Admin Dose 10 MG;  Start 4/15/19 at 21:00


Albuterol/ Ipratropium (Duoneb) 3 ml Q6HWA RESP  THERAPY HHN  Last administered 


on 4/17/19at 13:52; Admin Dose 3 ML;  Start 4/15/19 at 14:00


Albuterol/ Ipratropium (Duoneb) 3 ml Q2H RESP THERAPY  PRN HHN shortness of 


breath;  Start 4/15/19 at 14:00


Budesonide (Pulmicort (Neb)) 0.5 mg BID RESP  THERAPY HHN  Last administered on 


4/17/19at 07:28; Admin Dose 0.5 MG;  Start 4/15/19 at 20:00


IV Flush (NS 3 ml) 3 ml PER PROTOCOL IV ;  Start 4/15/19 at 14:00


Ondansetron HCl (Zofran Inj) 4 mg Q6H  PRN IV NAUSEA/VOMITING;  Start 4/15/19 at


14:00


Acetaminophen (Tylenol Tab) 650 mg Q6H  PRN PO .PAIN 1-3 OR TEMP;  Start 4/15/19


at 14:00


Guaifenesin/ Dextromethorphan (Robitussin Dm Liquid Cup) 5 ml Q4H  PRN PO cough;


 Start 4/15/19 at 14:00


Diagnostic Test (Pha) (Accu-Chek) 1 ea 02 XX ;  Start 4/16/19 at 02:00


Insulin Glargine (Lantus) 11 units DAILY@0800 SC  Last administered on 4/17/19at


08:31; Admin Dose 11 UNITS;  Start 4/16/19 at 08:00


Insulin Aspart (Novolog Insulin Pen) NOVOLOG *MILD* ALGORITHM WITH MEALS  


BEDTIME SC  Last administered on 4/17/19at 08:31; Admin Dose 2 UNIT;  Start 


4/15/19 at 18:00


Miscellaneous Information 1 ea NOTE XX ;  Start 4/15/19 at 14:00


Glucose (Glutose) 15 gm Q15M  PRN PO DECREASED GLUCOSE;  Start 4/15/19 at 14:00


Glucose (Glutose) 22.5 gm Q15M  PRN PO DECREASED GLUCOSE Last administered on 


4/16/19at 06:57; Admin Dose 22.5 GM;  Start 4/15/19 at 14:00


Dextrose (D50w Syringe) 25 ml Q15M  PRN IV DECREASED GLUCOSE;  Start 4/15/19 at 


14:00


Dextrose (D50w Syringe) 50 ml Q15M  PRN IV DECREASED GLUCOSE Last administered 


on 4/15/19at 18:01; Admin Dose 50 ML;  Start 4/15/19 at 14:00


Glucagon (Glucagen) 1 mg Q15M  PRN IM DECREASED GLUCOSE;  Start 4/15/19 at 14:00


Glucose (Glutose) 15 gm Q15M  PRN BUCCAL DECREASED GLUCOSE;  Start 4/15/19 at 


14:00


Amoxicillin/ Clavulanate Potassium (Augmentin) 875 mg BID PO  Last administered 


on 4/17/19at 08:27; Admin Dose 875 MG;  Start 4/17/19 at 09:00;  Stop 4/22/19 at


08:59











MARILU COLLINS MD                 Apr 17, 2019 14:12

## 2019-04-18 VITALS — DIASTOLIC BLOOD PRESSURE: 58 MMHG | SYSTOLIC BLOOD PRESSURE: 116 MMHG | RESPIRATION RATE: 16 BRPM | HEART RATE: 83 BPM

## 2019-04-18 VITALS — RESPIRATION RATE: 18 BRPM | HEART RATE: 72 BPM | DIASTOLIC BLOOD PRESSURE: 67 MMHG | SYSTOLIC BLOOD PRESSURE: 155 MMHG

## 2019-04-18 VITALS — SYSTOLIC BLOOD PRESSURE: 140 MMHG | DIASTOLIC BLOOD PRESSURE: 65 MMHG | RESPIRATION RATE: 18 BRPM | HEART RATE: 85 BPM

## 2019-04-18 RX ADMIN — BUDESONIDE SCH MG: 0.5 SUSPENSION RESPIRATORY (INHALATION) at 14:05

## 2019-04-18 RX ADMIN — IPRATROPIUM BROMIDE AND ALBUTEROL SULFATE 1 ML: .5; 3 SOLUTION RESPIRATORY (INHALATION) at 08:07

## 2019-04-18 RX ADMIN — ASPIRIN 1 MG: 81 TABLET, COATED ORAL at 08:34

## 2019-04-18 RX ADMIN — LOSARTAN POTASSIUM 1 MG: 50 TABLET ORAL at 08:33

## 2019-04-18 RX ADMIN — INSULIN ASPART 1 UNIT: 100 INJECTION, SOLUTION INTRAVENOUS; SUBCUTANEOUS at 08:43

## 2019-04-18 RX ADMIN — IPRATROPIUM BROMIDE AND ALBUTEROL SULFATE SCH ML: .5; 3 SOLUTION RESPIRATORY (INHALATION) at 14:05

## 2019-04-18 RX ADMIN — IPRATROPIUM BROMIDE AND ALBUTEROL SULFATE 1 ML: .5; 3 SOLUTION RESPIRATORY (INHALATION) at 14:05

## 2019-04-18 RX ADMIN — INSULIN ASPART 1 UNIT: 100 INJECTION, SOLUTION INTRAVENOUS; SUBCUTANEOUS at 13:30

## 2019-04-18 RX ADMIN — AMOXICILLIN AND CLAVULANATE POTASSIUM 1 MG: 875; 125 TABLET, FILM COATED ORAL at 08:33

## 2019-04-18 RX ADMIN — BUDESONIDE 1 MG: 0.5 SUSPENSION RESPIRATORY (INHALATION) at 08:07

## 2019-04-18 RX ADMIN — LOSARTAN POTASSIUM SCH MG: 50 TABLET, FILM COATED ORAL at 08:33

## 2019-04-18 RX ADMIN — AMOXICILLIN AND CLAVULANATE POTASSIUM SCH MG: 875; 125 TABLET, FILM COATED ORAL at 08:33

## 2019-04-18 RX ADMIN — INSULIN GLARGINE SCH UNITS: 100 INJECTION, SOLUTION SUBCUTANEOUS at 08:44

## 2019-04-18 RX ADMIN — OXYBUTYNIN CHLORIDE 1 MG: 5 TABLET ORAL at 08:34

## 2019-04-18 RX ADMIN — AMLODIPINE BESYLATE SCH MG: 10 TABLET ORAL at 08:34

## 2019-04-18 RX ADMIN — BUDESONIDE 1 MG: 0.5 SUSPENSION RESPIRATORY (INHALATION) at 14:05

## 2019-04-18 RX ADMIN — IPRATROPIUM BROMIDE AND ALBUTEROL SULFATE SCH ML: .5; 3 SOLUTION RESPIRATORY (INHALATION) at 08:07

## 2019-04-18 RX ADMIN — AMLODIPINE BESYLATE 1 MG: 10 TABLET ORAL at 08:34

## 2019-04-18 RX ADMIN — IPRATROPIUM BROMIDE AND ALBUTEROL SULFATE SCH ML: .5; 3 SOLUTION RESPIRATORY (INHALATION) at 14:06

## 2019-04-18 RX ADMIN — OXYBUTYNIN CHLORIDE SCH MG: 5 TABLET ORAL at 08:34

## 2019-04-18 RX ADMIN — BUDESONIDE SCH MG: 0.5 SUSPENSION RESPIRATORY (INHALATION) at 08:07

## 2019-04-18 RX ADMIN — INSULIN GLARGINE 1 UNITS: 100 INJECTION, SOLUTION SUBCUTANEOUS at 08:44

## 2019-04-18 RX ADMIN — METOPROLOL SUCCINATE 1 MG: 50 TABLET, EXTENDED RELEASE ORAL at 08:34

## 2019-04-18 RX ADMIN — METOPROLOL SUCCINATE SCH MG: 50 TABLET, EXTENDED RELEASE ORAL at 08:34

## 2019-04-18 RX ADMIN — ASPIRIN SCH MG: 81 TABLET, COATED ORAL at 08:34

## 2019-04-18 RX ADMIN — IPRATROPIUM BROMIDE AND ALBUTEROL SULFATE 1 ML: .5; 3 SOLUTION RESPIRATORY (INHALATION) at 14:06

## 2019-04-18 NOTE — DS
Date/Time of Note


Date/Time of Note


DATE: 4/18/19 


TIME: 15:16





Discharge Summary


Admission/Discharge Info


Admit Date/Time


Apr 15, 2019 at 13:32


Discharge Date/Time


4/18/19


Discharge Diagnosis


1.  Aspiration pneumonia


2.  Moderate AS


3.  Chronic cough


4.  Chronic encephalopathy


5.  History of ESBL UTI


6.  Hypertension


7.  Diabetes mellitus


8.  Dyslipidemia


Patient Condition:  Stable


Procedures


PROCEDURE:   XR chest. 


 


CLINICAL INDICATION:  Shortness of breath


 


TECHNIQUE:   A single portable view of the chest was obtained.  


 


COMPARISON:   05/03/2018 


 


FINDINGS:


 


The patient's chin superimposes the lung apices. Bibasilar opacities are 


identified which could represent infection or aspiration in the acute setting. 


There is no pleural effusion or pneumothorax. The cardiac silhouette is within 


normal limits. The aorta is tortuous and atherosclerotic.


 


IMPRESSION:


 


1.  Bibasilar opacities representing infection or aspiration in the acute 


setting.


 


 


 


RPTAT: AAEE


_____________________________________________ 


Bobby Penny Physician           Date    Time 


Electronically viewed and signed by Bobby Penny Physician on 04/15/2019 


13:07


Hx of Present Illness


Patient is an elderly female with a past medical history significant for 


dementia, diabetes, anemia, hypertension, dyslipidemia, ESBL UTI who presents to


[facility] for 2 weeks of unrelenting cough which causes her 


shortness of breath when laying down.  Information is provided from son at 


bedside as patient does have severe dementia and unable to cooperate for this 


HPI.  According to son patient has had persistent cough that is worse in the 


past 2 weeks, has sputum but patient swallows it, no fevers but patient having 


worse sounding lungs at night.  Further HPI cannot be concluded due to patient's


mental status


Hospital Course


Patient was admitted and started on IV antibiotics and bronchodilators for 


treatment of aspiration pneumonia.  Patient was continued on treatment for 


diabetes as well with monitor of her sugars during hospitalization.  Patients 


cough improved and was transitioned to PO antibiotics with continued clinical 


improvement.  Patient was developing delirium in setting of dementia and 


becoming more agitated.  Given improvement in presenting symptoms, discussion 


was held with son regarding discharge home with PO antibiotics and antitussives 


which he agreed to.  Arrangements were made for  as well prior to discharge.  


Patients presenting symptoms improved and patient was discharged home in stable 


condition with a course of PO antibiotics.


Home Meds


Active Scripts


Guaifenesin-D-Methorphan Hb* (Guaifenesin* DM Syrup) 120 Ml Syrup, 10 ML PO Q4H 


PRN for COUGH for 7 Days, #210 ML


   Prov:MARILU COLLINS MD         4/18/19


Albuterol Sulfate* (Ventolin HFA*) 18 Gm Hfa.aer.ad, 2 PUFF INHALATION Q4H for 


30 Days, #1 INHALER


   Prov:MARILU COLLINS MD         4/18/19


Amoxicillin/Potassium Clav (Amox-Clav 875-125 mg Tablet) 875-125 mg Tab, 875 MG 


PO BID for 7 Days, #13 TAB


   next dose 4/18 in the pm


   Prov:MARILU COLLINS MD         4/18/19


Reported Medications


Oxybutynin Chloride* (Ditropan*) 5 Mg Tablet, 5 MG PO DAILY, TAB


   4/15/19


Aspirin* (Aspirin* EC) 81 Mg Tablet.dr, 81 MG PO DAILY, TAB


   4/15/19


Rosuvastatin Calcium* (Crestor*) 5 Mg Tablet, 5 MG PO QHS, #30 TAB


   4/15/19


Losartan Potassium* (Losartan Potassium*) 100 Mg Tablet, 100 MG PO DAILY, TAB


   4/15/19


Amlodipine Besylate* (Amlodipine Besylate*) 10 Mg Tablet, 10 MG PO DAILY, #30 


TAB


   4/15/19


Metoprolol Succinate* (Toprol XL*) 50 Mg Tab.er.24h, 50 MG PO DAILY, #30 TAB


   4/15/19


Metformin Hcl* (Metformin Hcl*) 500 Mg Tablet, 500 MG PO WITH BREAKFAST DINNE 


PRN for AS NEEDED, #60 TAB


   4/15/19


Insulin Glargine* (Lantus*) 100 Unit/Ml Soln, 0 SC BID, #1 VIAL


   SLIDING SCALE


   4/15/19


Discontinued Reported Medications


Oxybutynin Chloride* (Ditropan*) 5 Mg Tab, 5 MG PO DAILY, TAB


   4/13/18


Amlodipine Besylate* (Amlodipine Besylate*) 10 Mg Tablet, 10 MG PO DAILY, #30 


TAB


   4/13/18


Metformin Hcl* (Metformin Hcl*) 500 Mg Tablet, 500 MG PO WITH MEALS, #90 TAB


   4/13/18


Aspirin* (Aspirin* EC) 81 Mg Tablet.dr, 81 MG PO DAILY, TAB


   4/13/18


Rosuvastatin Calcium* (Crestor*) 5 Mg Tablet, 5 MG PO QHS, #30 TAB


   4/13/18


Metoprolol Succinate* (Toprol XL*) 50 Mg Tab.er.24h, 50 MG PO DAILY, #30 TAB


   4/13/18


Insulin Glargine* (Lantus*) 100 Unit/Ml Soln, 6 UNIT SC BID, #1 VIAL


   4/13/18


Follow-up Plan


1.  Follow up with your primary care physician in 1 week


2.  Continue on Augmentin twice a day with your next dose tonight, 4/18, with 


dinner.  


3.  If you are experiencing shortness of breath, use the inhaler as needed


4. Take cough medicine as needed 


5. If experiencing any concerning symptoms, go to your nearest emergency 


department


Primary Care Provider


Not On Staff Doctor


Time spent on discharge:   > 30 minutes


Pending Labs





Laboratory Tests


Test
              4/17/19
17:50   4/17/19
21:46   4/18/19
02:55   4/18/19
08:32


Bedside                      290             253             258             255


Glucose
          mg/dL
()  mg/dL
()


Test
              4/18/19
13:24  
               
               



Bedside                      275  
               
               



Glucose
          mg/dL
()














MARILU COLLINS MD                 Apr 18, 2019 15:19

## 2019-04-18 NOTE — PDOCDIS
Discharge Instructions


DIAGNOSIS


Discharge Diagnosis


1.  Aspiration pneumonia


2.  Moderate AS


3.  Chronic cough


4.  Chronic encephalopathy


5.  History of ESBL UTI


6.  Hypertension


7.  Diabetes mellitus


8.  Dyslipidemia





CONDITION


                [id number]
Patient Condition:  [id number]
Stable








HOME CARE INSTRUCTIONS:


         [id number]
Diet Instructions:  [id number]
Low Fat /Cholesterol








FOLLOW UP/APPOINTMENTS


Follow-up Plan


1.  Follow up with your primary care physician in 1 week


2.  Continue on Augmentin twice a day with your next dose tonight, 4/18, with 


dinner.  


3.  If you are experiencing shortness of breath, use the inhaler as needed


4. Take cough medicine as needed 


5. If experiencing any concerning symptoms, go to your nearest emergency 


department











MARILU COLLINS MD                 Apr 18, 2019 12:31

## 2019-04-18 NOTE — PN
Date/Time of Note


Date/Time of Note


DATE: 4/18/19 


TIME: 12:14





Assessment/Plan


VTE Prophylaxis


Risk score (from Ns)>0 risk:  3


SCD applied (from [facility]  Yes


Pharmacological prophylaxis:  NA/contraindicated


Pharm contraindication:  patient refusal





Lines/Catheters


IV Catheter Type (from [facility]):  Saline Lock


Urinary Cath still in place:  No





Assessment/Plan


Assessment/Plan


1.  Aspiration pneumonia- improving


- Tolerating PO antibiotics and per nursing, has not appreciated much coughing 


- Continue on Antibiotics for 6 more days


- seen on CXR


- remains afebrile with nl WBC


- DuoNeb and budesonide nebulizer





2.  Moderate AS


- seen on ECHO. EF 65%





3.  Chronic cough


-  Robitussin-DM as needed





4.  Chronic encephalopathy


-  Secondary to severe dementia


-  remains pleasantly confused





5.  History of ESBL UTI


- no noted discomfort with urination





6.  Hypertension


-  Continue home meds





7.  Diabetes mellitus


-  continue Lantus and ISS. 





8.  Dyslipidemia


-  Continue home medication





9.  Disposition


- Medically stable for discharge home


Result Diagram:  


4/17/19 0453                                                                    


           4/17/19 0454





Results 24hrs





Laboratory Tests


  Test
            4/17/19
13:56  4/17/19
17:50  4/17/19
21:46  4/18/19
02:55


  Bedside Glucose         339  H         290  H         253  H         258  H


  Test
            4/18/19
08:32  
              
              



  Bedside Glucose         255  H








Subjective


24 Hr Interval Summary


Free Text/Dictation


Patient has been more agitated and slapping staff.  Not coughing this am and 


does not appear in any acute distress.





Exam/Review of Systems


Exam


Vitals





Vital Signs


  Date      Temp  Pulse  Resp  B/P (MAP)   Pulse Ox  O2          O2 Flow    FiO2


Time                                                 Delivery    Rate


   4/18/19           76    16                    96                           21


     08:08


   4/18/19  97.9                   155/67


     07:48                           (96)


   4/16/19                                           Room Air


     07:35








Intake and Output





4/17/19 4/17/19 4/18/19





1515:00


23:00


07:00





IntakeIntake Total


1000 ml


760 ml


60 ml





BalanceBalance


1000 ml


760 ml


60 ml











Exam


General: Patient is laying in bed. remains confused


Neck: Supple, nontender, midline


Respiratory: Diminished. no wheezing


Cardiovascular: regular rate and rhythm, no obvious murmurs


Gastrointestinal: soft, non-tender to palpation, nondistended, bowel sounds 


heard.


Neurological: Moves all extremities spontaneously


Skin: No new skin lesions





Results


Results 24hrs





Laboratory Tests


  Test
            4/17/19
13:56  4/17/19
17:50  4/17/19
21:46  4/18/19
02:55


  Bedside Glucose         339  H         290  H         253  H         258  H


  Test
            4/18/19
08:32  
              
              



  Bedside Glucose         255  H








Medications


Medication





Current Medications


Amlodipine Besylate (Norvasc) 10 mg DAILY PO  Last administered on 4/18/19at 


08:34; Admin Dose 10 MG;  Start 4/16/19 at 09:00


Aspirin (Halfprin) 81 mg DAILY PO  Last administered on 4/18/19at 08:34; Admin 


Dose 81 MG;  Start 4/16/19 at 09:00


Losartan Potassium (Cozaar) 100 mg DAILY PO  Last administered on 4/18/19at 08


:33; Admin Dose 100 MG;  Start 4/16/19 at 09:00


Metoprolol Succinate (Toprol Xl) 50 mg DAILY PO  Last administered on 4/18/19at 


08:34; Admin Dose 50 MG;  Start 4/16/19 at 09:00


Oxybutynin Chloride (Ditropan) 5 mg DAILY PO  Last administered on 4/18/19at 


08:34; Admin Dose 5 MG;  Start 4/16/19 at 09:00


Atorvastatin Calcium (Lipitor) 10 mg HS PO  Last administered on 4/17/19at 


21:43; Admin Dose 10 MG;  Start 4/15/19 at 21:00


Albuterol/ Ipratropium (Duoneb) 3 ml Q6HWA RESP  THERAPY HHN  Last administered 


on 4/18/19at 08:07; Admin Dose 3 ML;  Start 4/15/19 at 14:00


Albuterol/ Ipratropium (Duoneb) 3 ml Q2H RESP THERAPY  PRN HHN shortness of 


breath;  Start 4/15/19 at 14:00


Budesonide (Pulmicort (Neb)) 0.5 mg BID RESP  THERAPY HHN  Last administered on 


4/18/19at 08:07; Admin Dose 0.5 MG;  Start 4/15/19 at 20:00


IV Flush (NS 3 ml) 3 ml PER PROTOCOL IV ;  Start 4/15/19 at 14:00


Ondansetron HCl (Zofran Inj) 4 mg Q6H  PRN IV NAUSEA/VOMITING;  Start 4/15/19 at


14:00


Acetaminophen (Tylenol Tab) 650 mg Q6H  PRN PO .PAIN 1-3 OR TEMP;  Start 4/15/19


at 14:00


Guaifenesin/ Dextromethorphan (Robitussin Dm Liquid Cup) 5 ml Q4H  PRN PO cough 


Last administered on 4/17/19at 21:51; Admin Dose 5 ML;  Start 4/15/19 at 14:00


Diagnostic Test (Pha) (Accu-Chek) 1 ea 02 XX ;  Start 4/16/19 at 02:00


Insulin Glargine (Lantus) 11 units DAILY@0800 SC  Last administered on 4/18/19at


08:44; Admin Dose 11 UNITS;  Start 4/16/19 at 08:00


Insulin Aspart (Novolog Insulin Pen) NOVOLOG *MILD* ALGORITHM WITH MEALS  BEDTI


ME SC  Last administered on 4/18/19at 08:43; Admin Dose 3 UNIT;  Start 4/15/19 


at 18:00


Miscellaneous Information 1 ea NOTE XX ;  Start 4/15/19 at 14:00


Glucose (Glutose) 15 gm Q15M  PRN PO DECREASED GLUCOSE;  Start 4/15/19 at 14:00


Glucose (Glutose) 22.5 gm Q15M  PRN PO DECREASED GLUCOSE Last administered on 


4/16/19at 06:57; Admin Dose 22.5 GM;  Start 4/15/19 at 14:00


Dextrose (D50w Syringe) 25 ml Q15M  PRN IV DECREASED GLUCOSE;  Start 4/15/19 at 


14:00


Dextrose (D50w Syringe) 50 ml Q15M  PRN IV DECREASED GLUCOSE Last administered 


on 4/15/19at 18:01; Admin Dose 50 ML;  Start 4/15/19 at 14:00


Glucagon (Glucagen) 1 mg Q15M  PRN IM DECREASED GLUCOSE;  Start 4/15/19 at 14:00


Glucose (Glutose) 15 gm Q15M  PRN BUCCAL DECREASED GLUCOSE;  Start 4/15/19 at 


14:00


Amoxicillin/ Clavulanate Potassium (Augmentin) 875 mg BID PO  Last administered 


on 4/18/19at 08:33; Admin Dose 875 MG;  Start 4/17/19 at 09:00;  Stop 4/22/19 at


08:59


Metformin HCl (Glucophage) 500 mg WITH BREAKFAST  DINNE PO  Last administered on


4/18/19at 08:33; Admin Dose 500 MG;  Start 4/18/19 at 08:00











MARILU COLLINS MD                 Apr 18, 2019 12:17

## 2020-02-29 ENCOUNTER — HOSPITAL ENCOUNTER (INPATIENT)
Dept: HOSPITAL 12 - ER | Age: 85
LOS: 3 days | Discharge: HOME HEALTH | DRG: 871 | End: 2020-03-03
Attending: INTERNAL MEDICINE
Payer: MEDICARE

## 2020-02-29 VITALS — SYSTOLIC BLOOD PRESSURE: 124 MMHG | DIASTOLIC BLOOD PRESSURE: 66 MMHG

## 2020-02-29 VITALS — WEIGHT: 111 LBS | HEIGHT: 63 IN | BODY MASS INDEX: 19.67 KG/M2

## 2020-02-29 VITALS — DIASTOLIC BLOOD PRESSURE: 57 MMHG | SYSTOLIC BLOOD PRESSURE: 144 MMHG

## 2020-02-29 VITALS — SYSTOLIC BLOOD PRESSURE: 121 MMHG | DIASTOLIC BLOOD PRESSURE: 59 MMHG

## 2020-02-29 DIAGNOSIS — M19.90: ICD-10-CM

## 2020-02-29 DIAGNOSIS — Z90.49: ICD-10-CM

## 2020-02-29 DIAGNOSIS — F03.90: ICD-10-CM

## 2020-02-29 DIAGNOSIS — I35.8: ICD-10-CM

## 2020-02-29 DIAGNOSIS — N32.81: ICD-10-CM

## 2020-02-29 DIAGNOSIS — K59.00: ICD-10-CM

## 2020-02-29 DIAGNOSIS — E11.649: ICD-10-CM

## 2020-02-29 DIAGNOSIS — Z98.41: ICD-10-CM

## 2020-02-29 DIAGNOSIS — N31.9: ICD-10-CM

## 2020-02-29 DIAGNOSIS — Z79.82: ICD-10-CM

## 2020-02-29 DIAGNOSIS — Z98.42: ICD-10-CM

## 2020-02-29 DIAGNOSIS — I25.10: ICD-10-CM

## 2020-02-29 DIAGNOSIS — E78.5: ICD-10-CM

## 2020-02-29 DIAGNOSIS — N12: ICD-10-CM

## 2020-02-29 DIAGNOSIS — I15.2: ICD-10-CM

## 2020-02-29 DIAGNOSIS — E11.69: ICD-10-CM

## 2020-02-29 DIAGNOSIS — E44.0: ICD-10-CM

## 2020-02-29 DIAGNOSIS — D68.59: ICD-10-CM

## 2020-02-29 DIAGNOSIS — N17.0: ICD-10-CM

## 2020-02-29 DIAGNOSIS — E86.0: ICD-10-CM

## 2020-02-29 DIAGNOSIS — B96.4: ICD-10-CM

## 2020-02-29 DIAGNOSIS — G92: ICD-10-CM

## 2020-02-29 DIAGNOSIS — M54.30: ICD-10-CM

## 2020-02-29 DIAGNOSIS — A41.9: Primary | ICD-10-CM

## 2020-02-29 DIAGNOSIS — D63.8: ICD-10-CM

## 2020-02-29 DIAGNOSIS — Z74.09: ICD-10-CM

## 2020-02-29 DIAGNOSIS — E11.65: ICD-10-CM

## 2020-02-29 DIAGNOSIS — Z79.84: ICD-10-CM

## 2020-02-29 LAB
ALP SERPL-CCNC: 82 U/L (ref 50–136)
ALT SERPL W/O P-5'-P-CCNC: 16 U/L (ref 14–59)
APPEARANCE UR: (no result)
APPEARANCE UR: (no result)
AST SERPL-CCNC: 15 U/L (ref 15–37)
BASOPHILS # BLD AUTO: 0.1 K/UL (ref 0–8)
BASOPHILS NFR BLD AUTO: 0.7 % (ref 0–2)
BILIRUB DIRECT SERPL-MCNC: < 0.1 MG/DL (ref 0–0.2)
BILIRUB SERPL-MCNC: 0.5 MG/DL (ref 0.2–1)
BILIRUB UR QL STRIP: (no result)
BILIRUB UR QL STRIP: NEGATIVE
BUN SERPL-MCNC: 43 MG/DL (ref 7–18)
CHLORIDE SERPL-SCNC: 109 MMOL/L (ref 98–107)
CO2 SERPL-SCNC: 19 MMOL/L (ref 21–32)
COLOR UR: YELLOW
COLOR UR: YELLOW
CREAT SERPL-MCNC: 1.3 MG/DL (ref 0.6–1.3)
CREAT UR-MCNC: 37.3 MG/DL (ref 30–125)
DEPRECATED SQUAMOUS URNS QL MICRO: (no result) /HPF
EOSINOPHIL # BLD AUTO: 0.1 K/UL (ref 0–0.7)
EOSINOPHIL NFR BLD AUTO: 0.6 % (ref 0–7)
GLUCOSE SERPL-MCNC: 59 MG/DL (ref 74–106)
GLUCOSE UR STRIP-MCNC: (no result) MG/DL
GLUCOSE UR STRIP-MCNC: NEGATIVE MG/DL
HCT VFR BLD AUTO: 32.9 % (ref 31.2–41.9)
HGB BLD-MCNC: 10.9 G/DL (ref 10.9–14.3)
HGB UR QL STRIP: (no result)
HGB UR QL STRIP: (no result)
KETONES UR STRIP-MCNC: (no result) MG/DL
KETONES UR STRIP-MCNC: NEGATIVE MG/DL
LEUKOCYTE ESTERASE UR QL STRIP: (no result)
LEUKOCYTE ESTERASE UR QL STRIP: (no result)
LIPASE SERPL-CCNC: 45 U/L (ref 73–393)
LYMPHOCYTES # BLD AUTO: 2 K/UL (ref 20–40)
LYMPHOCYTES NFR BLD AUTO: 19.7 % (ref 20.5–51.5)
MCH RBC QN AUTO: 28.7 UUG (ref 24.7–32.8)
MCHC RBC AUTO-ENTMCNC: 33 G/DL (ref 32.3–35.6)
MCV RBC AUTO: 86.4 FL (ref 75.5–95.3)
MONOCYTES # BLD AUTO: 1 K/UL (ref 2–10)
MONOCYTES NFR BLD AUTO: 9.5 % (ref 0–11)
MUCOUS THREADS URNS QL MICRO: (no result) /LPF
NEUTROPHILS # BLD AUTO: 7.1 K/UL (ref 1.8–8.9)
NEUTROPHILS NFR BLD AUTO: 69.5 % (ref 38.5–71.5)
NITRITE UR QL STRIP: POSITIVE
NITRITE UR QL STRIP: POSITIVE
PH UR STRIP: 8 [PH] (ref 5–8)
PH UR STRIP: 8.5 [PH] (ref 5–8)
PLATELET # BLD AUTO: 470 K/UL (ref 179–408)
POTASSIUM SERPL-SCNC: 4.2 MMOL/L (ref 3.5–5.1)
PROT UR-MCNC: > 1000 MG/DL
RBC # BLD AUTO: 3.81 MIL/UL (ref 3.63–4.92)
RBC #/AREA URNS HPF: (no result) /HPF (ref 0–3)
RBC #/AREA URNS HPF: (no result) /HPF (ref 0–3)
SP GR UR STRIP: 1 (ref 1–1.03)
SP GR UR STRIP: 1.01 (ref 1–1.03)
UROBILINOGEN UR STRIP-MCNC: 0.2 E.U./DL
UROBILINOGEN UR STRIP-MCNC: 0.2 E.U./DL
WBC # BLD AUTO: 10.3 K/UL (ref 3.8–11.8)
WBC #/AREA URNS HPF: (no result) /HPF
WBC #/AREA URNS HPF: (no result) /HPF
WBC #/AREA URNS HPF: (no result) /HPF (ref 0–3)
WBC #/AREA URNS HPF: (no result) /HPF (ref 0–3)
WS STN SPEC: 9.1 G/DL (ref 6.4–8.2)

## 2020-02-29 PROCEDURE — C1758 CATHETER, URETERAL: HCPCS

## 2020-02-29 PROCEDURE — A4663 DIALYSIS BLOOD PRESSURE CUFF: HCPCS

## 2020-02-29 PROCEDURE — G0378 HOSPITAL OBSERVATION PER HR: HCPCS

## 2020-02-29 RX ADMIN — AMLODIPINE BESYLATE SCH MG: 10 TABLET ORAL at 09:14

## 2020-02-29 RX ADMIN — ASPIRIN SCH MG: 81 TABLET, CHEWABLE ORAL at 09:14

## 2020-02-29 RX ADMIN — METOPROLOL SUCCINATE SCH MG: 50 TABLET, FILM COATED, EXTENDED RELEASE ORAL at 09:14

## 2020-02-29 NOTE — NUR
RECEIVED PT FROM NOC RN. PT HAS JUST GOTTEN TO THE UNIT FROM ER AS NEW ADMIT. NO ACUTE 
DISTRESS NOTED. NO SOB NOTED. HAHN CATHETER FLOWING YELLOW URINE WITH LARGE AMOUNT OF 
SEDIMENT. PT SOMEWHAT COMBATIVE. BED LOCKED AND IN LOW POSITION. WILL CONTINUE TO MONITOR 
FOR SAFETY AND COMFORT.

## 2020-02-29 NOTE — NUR
Patient brought in by rescue ambulance from home for fever. Per rescue 
patient's son called 911 due to patient having an elevated temp. Placed in bed 
1A, gowned Attached to bedside monitor. Patient noted to be covered in feces, 
pt cleaned and linen changed. Awaiting MD fletcher.

## 2020-02-29 NOTE — NUR
PT RESTING COMFORTABLY IN BED. NO ACUTE DISTRESS NOTED. URINE YELLOW WITH SEDIMENTS MD 
AWARE. 2 D ECHO ORDERED. PT CONSUMED 75%+ OF HER DINNER. WILL GIVE REPORT ACCORDINGLY.

## 2020-03-01 VITALS — SYSTOLIC BLOOD PRESSURE: 135 MMHG | DIASTOLIC BLOOD PRESSURE: 54 MMHG

## 2020-03-01 VITALS — SYSTOLIC BLOOD PRESSURE: 113 MMHG | DIASTOLIC BLOOD PRESSURE: 50 MMHG

## 2020-03-01 VITALS — SYSTOLIC BLOOD PRESSURE: 112 MMHG | DIASTOLIC BLOOD PRESSURE: 39 MMHG

## 2020-03-01 VITALS — SYSTOLIC BLOOD PRESSURE: 125 MMHG | DIASTOLIC BLOOD PRESSURE: 56 MMHG

## 2020-03-01 LAB
ALP SERPL-CCNC: 55 U/L (ref 50–136)
ALT SERPL W/O P-5'-P-CCNC: 13 U/L (ref 14–59)
AST SERPL-CCNC: 9 U/L (ref 15–37)
BASOPHILS # BLD AUTO: 0 K/UL (ref 0–8)
BASOPHILS NFR BLD AUTO: 0.5 % (ref 0–2)
BILIRUB SERPL-MCNC: 0.3 MG/DL (ref 0.2–1)
BUN SERPL-MCNC: 35 MG/DL (ref 7–18)
CHLORIDE SERPL-SCNC: 113 MMOL/L (ref 98–107)
CHOLEST SERPL-MCNC: 95 MG/DL (ref ?–200)
CK SERPL-CCNC: 53 U/L (ref 26–192)
CO2 SERPL-SCNC: 20 MMOL/L (ref 21–32)
CREAT SERPL-MCNC: 1.1 MG/DL (ref 0.6–1.3)
EOSINOPHIL # BLD AUTO: 0.1 K/UL (ref 0–0.7)
EOSINOPHIL NFR BLD AUTO: 1 % (ref 0–7)
GLUCOSE SERPL-MCNC: 411 MG/DL (ref 74–106)
HCT VFR BLD AUTO: 26.9 % (ref 31.2–41.9)
HDLC SERPL-MCNC: 45 MG/DL (ref 40–60)
HEMOCCULT STL QL: NEGATIVE
HGB BLD-MCNC: 8.8 G/DL (ref 10.9–14.3)
IRON SERPL-MCNC: 10 UG/DL (ref 50–175)
LYMPHOCYTES # BLD AUTO: 1.6 K/UL (ref 20–40)
LYMPHOCYTES NFR BLD AUTO: 18.9 % (ref 20.5–51.5)
MAGNESIUM SERPL-MCNC: 2.1 MG/DL (ref 1.8–2.4)
MCH RBC QN AUTO: 28.9 UUG (ref 24.7–32.8)
MCHC RBC AUTO-ENTMCNC: 33 G/DL (ref 32.3–35.6)
MCV RBC AUTO: 88.1 FL (ref 75.5–95.3)
MONOCYTES # BLD AUTO: 0.7 K/UL (ref 2–10)
MONOCYTES NFR BLD AUTO: 8.2 % (ref 0–11)
NEUTROPHILS # BLD AUTO: 5.8 K/UL (ref 1.8–8.9)
NEUTROPHILS NFR BLD AUTO: 71.4 % (ref 38.5–71.5)
PHOSPHATE SERPL-MCNC: 3.1 MG/DL (ref 2.5–4.9)
PLATELET # BLD AUTO: 356 K/UL (ref 179–408)
POTASSIUM SERPL-SCNC: 4.2 MMOL/L (ref 3.5–5.1)
RBC # BLD AUTO: 3.06 MIL/UL (ref 3.63–4.92)
TRIGL SERPL-MCNC: 60 MG/DL (ref 30–150)
WBC # BLD AUTO: 8.2 K/UL (ref 3.8–11.8)
WS STN SPEC: 7.4 G/DL (ref 6.4–8.2)

## 2020-03-01 RX ADMIN — ACETAMINOPHEN PRN MG: 325 TABLET ORAL at 03:57

## 2020-03-01 RX ADMIN — SODIUM CHLORIDE PRN UNIT: 9 INJECTION, SOLUTION INTRAVENOUS at 20:50

## 2020-03-01 RX ADMIN — SODIUM CHLORIDE PRN UNIT: 9 INJECTION, SOLUTION INTRAVENOUS at 11:21

## 2020-03-01 RX ADMIN — LINAGLIPTIN SCH MG: 5 TABLET, FILM COATED ORAL at 11:25

## 2020-03-01 RX ADMIN — ASPIRIN SCH MG: 81 TABLET, CHEWABLE ORAL at 09:04

## 2020-03-01 RX ADMIN — ANORECTAL OINTMENT SCH GM: 15.7; .44; 24; 20.6 OINTMENT TOPICAL at 20:47

## 2020-03-01 RX ADMIN — Medication SCH EACH: at 20:47

## 2020-03-01 RX ADMIN — SODIUM CHLORIDE PRN UNIT: 9 INJECTION, SOLUTION INTRAVENOUS at 16:58

## 2020-03-01 RX ADMIN — AMLODIPINE BESYLATE SCH MG: 10 TABLET ORAL at 09:04

## 2020-03-01 RX ADMIN — Medication SCH EACH: at 16:55

## 2020-03-01 RX ADMIN — ACETAMINOPHEN PRN MG: 325 TABLET ORAL at 20:47

## 2020-03-01 RX ADMIN — DEXTROSE SCH MLS/HR: 50 INJECTION, SOLUTION INTRAVENOUS at 05:20

## 2020-03-01 RX ADMIN — Medication SCH EACH: at 11:19

## 2020-03-01 RX ADMIN — Medication SCH EACH: at 09:00

## 2020-03-01 RX ADMIN — SODIUM CHLORIDE PRN UNIT: 9 INJECTION, SOLUTION INTRAVENOUS at 09:11

## 2020-03-01 RX ADMIN — METOPROLOL SUCCINATE SCH MG: 50 TABLET, FILM COATED, EXTENDED RELEASE ORAL at 09:04

## 2020-03-01 NOTE — NUR
RECEIVED PT IN BED, SLOUCHED OVER, REPOSITIONED. APPEARS TO BE STABLE AND COMFORTABLE, NO 
SIGNS OF PAIN OR DISCOMFORT. UNABLE TO VERBALIZE NEEDS. HAHN CATHETER DRAINING, TO DARK 
YELLOW/CLOUDY URINE. WILL ANTICIPATE NEEDS AND MONITOR FREQUENTLY.

## 2020-03-01 NOTE — NUR
DUE MEDICATIONS INCLUDING INSULIN GIVEN PER SLIDING SCALE PATIENT ALREADY ATE HER BREAKFAST 
WITH GOOD APPETITE ECHO PARTIALLY DONE AS PATIENT BECAME RESISTANT TOWARDS THE END OF THE 
TEST MADE COMFORTABLE ALERT TO SELF BUR INCOHERENT AT THIS TIME

## 2020-03-01 NOTE — NUR
Second sample shows glucose of 411. Dr Thakur was paged again, waiting for response. Endorsed 
to day shift RN

## 2020-03-01 NOTE — NUR
RECEIVED PATIENT IN BED AWAKE CONFUSED AND DISORIENTED ALL NEEDS ANTICIPATED AND SATISFIED 
SHE IS ON ROOM AIR WITH NO SHORTNESS OF BREATH AT THIS TIME PATIENT HAS HYPERGLYCEMIA PER 
NOC RN AWAITING FOR DR LINN TO PLACE IN ORDERS.TURNED AND REPOSITIONED WITH HEELS 
FLOATED MADE COMFORTABLE AND WILL CONTINUE TO OBSERVE.

## 2020-03-01 NOTE — NUR
Lab called with a critical glucose value of 403. Asked to redraw the sample. POC glucose 
test is 351. Will dulce the MD for further orders. A second sample was collected and sent to 
the lab.

## 2020-03-02 VITALS — DIASTOLIC BLOOD PRESSURE: 47 MMHG | SYSTOLIC BLOOD PRESSURE: 119 MMHG

## 2020-03-02 VITALS — DIASTOLIC BLOOD PRESSURE: 61 MMHG | SYSTOLIC BLOOD PRESSURE: 143 MMHG

## 2020-03-02 VITALS — DIASTOLIC BLOOD PRESSURE: 52 MMHG | SYSTOLIC BLOOD PRESSURE: 132 MMHG

## 2020-03-02 VITALS — SYSTOLIC BLOOD PRESSURE: 122 MMHG | DIASTOLIC BLOOD PRESSURE: 59 MMHG

## 2020-03-02 LAB
ALP SERPL-CCNC: 66 U/L (ref 50–136)
ALT SERPL W/O P-5'-P-CCNC: 15 U/L (ref 14–59)
AST SERPL-CCNC: 17 U/L (ref 15–37)
BASOPHILS # BLD AUTO: 0 K/UL (ref 0–8)
BASOPHILS NFR BLD AUTO: 0.6 % (ref 0–2)
BILIRUB SERPL-MCNC: 0.3 MG/DL (ref 0.2–1)
BUN SERPL-MCNC: 37 MG/DL (ref 7–18)
CHLORIDE SERPL-SCNC: 111 MMOL/L (ref 98–107)
CO2 SERPL-SCNC: 21 MMOL/L (ref 21–32)
CREAT SERPL-MCNC: 1 MG/DL (ref 0.6–1.3)
EOSINOPHIL # BLD AUTO: 0.1 K/UL (ref 0–0.7)
EOSINOPHIL NFR BLD AUTO: 1.8 % (ref 0–7)
GLUCOSE SERPL-MCNC: 338 MG/DL (ref 74–106)
HCT VFR BLD AUTO: 31 % (ref 31.2–41.9)
HGB BLD-MCNC: 10 G/DL (ref 10.9–14.3)
LYMPHOCYTES # BLD AUTO: 1.3 K/UL (ref 20–40)
LYMPHOCYTES NFR BLD AUTO: 18.6 % (ref 20.5–51.5)
MAGNESIUM SERPL-MCNC: 1.7 MG/DL (ref 1.8–2.4)
MCH RBC QN AUTO: 28.2 UUG (ref 24.7–32.8)
MCHC RBC AUTO-ENTMCNC: 32 G/DL (ref 32.3–35.6)
MCV RBC AUTO: 87.6 FL (ref 75.5–95.3)
MONOCYTES # BLD AUTO: 0.4 K/UL (ref 2–10)
MONOCYTES NFR BLD AUTO: 6 % (ref 0–11)
NEUTROPHILS # BLD AUTO: 5.3 K/UL (ref 1.8–8.9)
NEUTROPHILS NFR BLD AUTO: 73 % (ref 38.5–71.5)
PHOSPHATE SERPL-MCNC: 3.3 MG/DL (ref 2.5–4.9)
PLATELET # BLD AUTO: 382 K/UL (ref 179–408)
POTASSIUM SERPL-SCNC: 4.7 MMOL/L (ref 3.5–5.1)
RBC # BLD AUTO: 3.54 MIL/UL (ref 3.63–4.92)
WBC # BLD AUTO: 7.2 K/UL (ref 3.8–11.8)
WS STN SPEC: 8 G/DL (ref 6.4–8.2)

## 2020-03-02 RX ADMIN — Medication SCH MG: at 10:28

## 2020-03-02 RX ADMIN — GLIMEPIRIDE SCH MG: 2 TABLET ORAL at 17:17

## 2020-03-02 RX ADMIN — DEXTROSE SCH MLS/HR: 50 INJECTION, SOLUTION INTRAVENOUS at 06:03

## 2020-03-02 RX ADMIN — MAGNESIUM SULFATE IN DEXTROSE SCH MLS/HR: 10 INJECTION, SOLUTION INTRAVENOUS at 17:27

## 2020-03-02 RX ADMIN — AMLODIPINE BESYLATE SCH MG: 10 TABLET ORAL at 09:08

## 2020-03-02 RX ADMIN — MAGNESIUM SULFATE IN DEXTROSE SCH MLS/HR: 10 INJECTION, SOLUTION INTRAVENOUS at 15:17

## 2020-03-02 RX ADMIN — LINAGLIPTIN SCH MG: 5 TABLET, FILM COATED ORAL at 09:10

## 2020-03-02 RX ADMIN — GLIMEPIRIDE SCH MG: 2 TABLET ORAL at 10:27

## 2020-03-02 RX ADMIN — ANORECTAL OINTMENT SCH APPLIC: 15.7; .44; 24; 20.6 OINTMENT TOPICAL at 21:03

## 2020-03-02 RX ADMIN — Medication SCH EACH: at 11:45

## 2020-03-02 RX ADMIN — Medication SCH MG: at 21:01

## 2020-03-02 RX ADMIN — Medication SCH EACH: at 16:30

## 2020-03-02 RX ADMIN — SODIUM CHLORIDE PRN UNIT: 9 INJECTION, SOLUTION INTRAVENOUS at 21:07

## 2020-03-02 RX ADMIN — SODIUM CHLORIDE PRN UNIT: 9 INJECTION, SOLUTION INTRAVENOUS at 08:03

## 2020-03-02 RX ADMIN — METOPROLOL SUCCINATE SCH MG: 50 TABLET, FILM COATED, EXTENDED RELEASE ORAL at 09:10

## 2020-03-02 RX ADMIN — SODIUM CHLORIDE PRN UNIT: 9 INJECTION, SOLUTION INTRAVENOUS at 12:09

## 2020-03-02 RX ADMIN — ASPIRIN SCH MG: 81 TABLET, CHEWABLE ORAL at 09:07

## 2020-03-02 RX ADMIN — SODIUM CHLORIDE PRN UNIT: 9 INJECTION, SOLUTION INTRAVENOUS at 17:21

## 2020-03-02 RX ADMIN — Medication SCH EACH: at 06:50

## 2020-03-02 RX ADMIN — ANORECTAL OINTMENT SCH GM: 15.7; .44; 24; 20.6 OINTMENT TOPICAL at 09:00

## 2020-03-02 RX ADMIN — Medication SCH EACH: at 21:06

## 2020-03-02 NOTE — NUR
pt sleeping well at this time, slept intermittently throughout night. Turned and 
repositioned every 2 hours for comfort and skin breakdown prevention. Kept monitored hourly. 
Will continue to monitor.

## 2020-03-02 NOTE — NUR
hands off report received from Rachelle. Pt in no acute distress, safety and comfort 
provided. will continue to monitor.

## 2020-03-02 NOTE — NUR
WOUND CARE CONSULT: PT PRESENTS WITH SACRAL INTACT DEEP TISSUE INJURY WITH SURROUNDING 
SCARRING WHICH EXTENDS TO BILATERAL BUTTOCKS, PRESENT ON ADMISSION. RECOMMEND SURGICAL 
CONSULT. DR DAVIS NOTIFIED. PT IS COMBATIVE AT TIMES AND IS INCONTINENT OF STOOL. PT 
TENDS TO CROSS HER LEGS.  RECOMMENDATIONS MADE FOR SKIN PROTECTION. DISCUSSED WITH NURSING 
STAFF. WILL SEE PRN. MD IN AGREEMENT WITH PLAN OF CARE. 

-------------------------------------------------------------------------------

Addendum: 03/02/20 at 1412 by TIFFANY HAMPTON RN

-------------------------------------------------------------------------------

Amended: Links added.

## 2020-03-03 VITALS — SYSTOLIC BLOOD PRESSURE: 129 MMHG | DIASTOLIC BLOOD PRESSURE: 51 MMHG

## 2020-03-03 VITALS — SYSTOLIC BLOOD PRESSURE: 128 MMHG | DIASTOLIC BLOOD PRESSURE: 60 MMHG

## 2020-03-03 VITALS — SYSTOLIC BLOOD PRESSURE: 125 MMHG | DIASTOLIC BLOOD PRESSURE: 60 MMHG

## 2020-03-03 LAB
ALBUMIN SERPL ELPH-MCNC: 2.5 G/DL (ref 2.9–4.4)
ALBUMIN/GLOB SERPL: 0.6 {RATIO} (ref 0.7–1.7)
ALPHA1 GLOB SERPL ELPH-MCNC: 0.3 G/DL (ref 0–0.4)
ALPHA2 GLOB SERPL ELPH-MCNC: 1 G/DL (ref 0.4–1)
B-GLOBULIN SERPL ELPH-MCNC: 0.9 G/DL (ref 0.7–1.3)
BASOPHILS # BLD AUTO: 0.1 K/UL (ref 0–8)
BASOPHILS NFR BLD AUTO: 0.8 % (ref 0–2)
BUN SERPL-MCNC: 34 MG/DL (ref 7–18)
CHLORIDE SERPL-SCNC: 111 MMOL/L (ref 98–107)
CO2 SERPL-SCNC: 24 MMOL/L (ref 21–32)
CREAT SERPL-MCNC: 0.8 MG/DL (ref 0.6–1.3)
EOSINOPHIL # BLD AUTO: 0.2 K/UL (ref 0–0.7)
EOSINOPHIL NFR BLD AUTO: 2 % (ref 0–7)
GAMMA GLOB SERPL ELPH-MCNC: 1.9 G/DL (ref 0.4–1.8)
GLOBULIN SER CALC-MCNC: 4.2 G/DL (ref 2.2–3.9)
GLUCOSE SERPL-MCNC: 253 MG/DL (ref 74–106)
HCT VFR BLD AUTO: 28.5 % (ref 31.2–41.9)
HGB BLD-MCNC: 9.5 G/DL (ref 10.9–14.3)
LYMPHOCYTES # BLD AUTO: 2 K/UL (ref 20–40)
LYMPHOCYTES NFR BLD AUTO: 22.8 % (ref 20.5–51.5)
MAGNESIUM SERPL-MCNC: 2.2 MG/DL (ref 1.8–2.4)
MCH RBC QN AUTO: 28.9 UUG (ref 24.7–32.8)
MCHC RBC AUTO-ENTMCNC: 33 G/DL (ref 32.3–35.6)
MCV RBC AUTO: 86.8 FL (ref 75.5–95.3)
MONOCYTES # BLD AUTO: 0.4 K/UL (ref 2–10)
MONOCYTES NFR BLD AUTO: 5.1 % (ref 0–11)
NEUTROPHILS # BLD AUTO: 6.1 K/UL (ref 1.8–8.9)
NEUTROPHILS NFR BLD AUTO: 69.3 % (ref 38.5–71.5)
PHOSPHATE SERPL-MCNC: 3.4 MG/DL (ref 2.5–4.9)
PLATELET # BLD AUTO: 427 K/UL (ref 179–408)
POTASSIUM SERPL-SCNC: 4.7 MMOL/L (ref 3.5–5.1)
RBC # BLD AUTO: 3.29 MIL/UL (ref 3.63–4.92)
WBC # BLD AUTO: 8.8 K/UL (ref 3.8–11.8)

## 2020-03-03 RX ADMIN — Medication SCH EACH: at 06:32

## 2020-03-03 RX ADMIN — LINAGLIPTIN SCH MG: 5 TABLET, FILM COATED ORAL at 09:45

## 2020-03-03 RX ADMIN — GLIMEPIRIDE SCH MG: 2 TABLET ORAL at 18:06

## 2020-03-03 RX ADMIN — Medication SCH EACH: at 11:22

## 2020-03-03 RX ADMIN — ANORECTAL OINTMENT SCH APPLIC: 15.7; .44; 24; 20.6 OINTMENT TOPICAL at 09:00

## 2020-03-03 RX ADMIN — Medication SCH EACH: at 16:30

## 2020-03-03 RX ADMIN — SODIUM CHLORIDE PRN UNIT: 9 INJECTION, SOLUTION INTRAVENOUS at 11:43

## 2020-03-03 RX ADMIN — ASPIRIN SCH MG: 81 TABLET, CHEWABLE ORAL at 09:44

## 2020-03-03 RX ADMIN — GLIMEPIRIDE SCH MG: 2 TABLET ORAL at 09:43

## 2020-03-03 RX ADMIN — Medication SCH MG: at 09:44

## 2020-03-03 RX ADMIN — AMLODIPINE BESYLATE SCH MG: 10 TABLET ORAL at 09:44

## 2020-03-03 RX ADMIN — SODIUM CHLORIDE PRN UNIT: 9 INJECTION, SOLUTION INTRAVENOUS at 09:48

## 2020-03-03 NOTE — NUR
PT SLEPT INTERMITTENTLY. PT IN NO ACUTE DISTRESS. PRESCRIBED MEDICATION GIVEN AND PT 
TOLERATED IT WELL. PT HAD 1 BOWEL MOVEMENT. SAFETY AND COMFORT PROVIDED. ALL NEEDS ARE MET. 
WILL ENDORSE TO INCOMING NURSE FOR CONTINUITY OF CARE.

## 2020-06-26 NOTE — NUR
EPIC REPAGED muscular dystrophy, COVID, c diff, presenting with 2 days of fever, diarrhea and UTI.   Dx: Sepsis 2/2   	C Diff - Vanco Pt presented with 2 days of fever, diarrhea and UTI.

## 2020-08-31 ENCOUNTER — HOSPITAL ENCOUNTER (INPATIENT)
Dept: HOSPITAL 12 - ER | Age: 85
LOS: 3 days | Discharge: HOME HEALTH | DRG: 871 | End: 2020-09-03
Admitting: INTERNAL MEDICINE
Payer: MEDICARE

## 2020-08-31 VITALS — WEIGHT: 98 LBS | BODY MASS INDEX: 17.36 KG/M2 | HEIGHT: 63 IN

## 2020-08-31 DIAGNOSIS — I70.0: ICD-10-CM

## 2020-08-31 DIAGNOSIS — I11.9: ICD-10-CM

## 2020-08-31 DIAGNOSIS — R40.2143: ICD-10-CM

## 2020-08-31 DIAGNOSIS — R40.2363: ICD-10-CM

## 2020-08-31 DIAGNOSIS — D64.9: ICD-10-CM

## 2020-08-31 DIAGNOSIS — F03.91: ICD-10-CM

## 2020-08-31 DIAGNOSIS — M54.30: ICD-10-CM

## 2020-08-31 DIAGNOSIS — E78.5: ICD-10-CM

## 2020-08-31 DIAGNOSIS — Z87.440: ICD-10-CM

## 2020-08-31 DIAGNOSIS — Z79.82: ICD-10-CM

## 2020-08-31 DIAGNOSIS — R62.7: ICD-10-CM

## 2020-08-31 DIAGNOSIS — Z79.84: ICD-10-CM

## 2020-08-31 DIAGNOSIS — Z90.49: ICD-10-CM

## 2020-08-31 DIAGNOSIS — E11.65: ICD-10-CM

## 2020-08-31 DIAGNOSIS — Z79.4: ICD-10-CM

## 2020-08-31 DIAGNOSIS — K52.89: ICD-10-CM

## 2020-08-31 DIAGNOSIS — N28.1: ICD-10-CM

## 2020-08-31 DIAGNOSIS — N32.0: ICD-10-CM

## 2020-08-31 DIAGNOSIS — K59.09: ICD-10-CM

## 2020-08-31 DIAGNOSIS — A41.9: Primary | ICD-10-CM

## 2020-08-31 DIAGNOSIS — G92: ICD-10-CM

## 2020-08-31 DIAGNOSIS — R40.2243: ICD-10-CM

## 2020-08-31 DIAGNOSIS — N17.0: ICD-10-CM

## 2020-08-31 DIAGNOSIS — N13.6: ICD-10-CM

## 2020-08-31 DIAGNOSIS — M19.90: ICD-10-CM

## 2020-08-31 DIAGNOSIS — D68.69: ICD-10-CM

## 2020-08-31 LAB
ALP SERPL-CCNC: 62 U/L (ref 50–136)
ALT SERPL W/O P-5'-P-CCNC: 10 U/L (ref 14–59)
APPEARANCE UR: (no result)
AST SERPL-CCNC: 16 U/L (ref 15–37)
BASOPHILS # BLD AUTO: 0.1 K/UL (ref 0–8)
BASOPHILS NFR BLD AUTO: 0.9 % (ref 0–2)
BILIRUB DIRECT SERPL-MCNC: 0.1 MG/DL (ref 0–0.2)
BILIRUB SERPL-MCNC: 0.2 MG/DL (ref 0.2–1)
BILIRUB UR QL STRIP: NEGATIVE
BUN SERPL-MCNC: 36 MG/DL (ref 7–18)
CHLORIDE SERPL-SCNC: 107 MMOL/L (ref 98–107)
CO2 SERPL-SCNC: 20 MMOL/L (ref 21–32)
COLOR UR: YELLOW
CREAT SERPL-MCNC: 1.3 MG/DL (ref 0.6–1.3)
DEPRECATED SQUAMOUS URNS QL MICRO: (no result) /HPF
EOSINOPHIL # BLD AUTO: 0.1 K/UL (ref 0–0.7)
EOSINOPHIL NFR BLD AUTO: 1.3 % (ref 0–7)
GLUCOSE SERPL-MCNC: 115 MG/DL (ref 74–106)
GLUCOSE UR STRIP-MCNC: NEGATIVE MG/DL
HCT VFR BLD AUTO: 36 % (ref 31.2–41.9)
HGB BLD-MCNC: 11.9 G/DL (ref 10.9–14.3)
HGB UR QL STRIP: (no result)
KETONES UR STRIP-MCNC: NEGATIVE MG/DL
LEUKOCYTE ESTERASE UR QL STRIP: (no result)
LYMPHOCYTES # BLD AUTO: 1.3 K/UL (ref 20–40)
LYMPHOCYTES NFR BLD AUTO: 22.8 % (ref 20.5–51.5)
MCH RBC QN AUTO: 29.1 UUG (ref 24.7–32.8)
MCHC RBC AUTO-ENTMCNC: 33 G/DL (ref 32.3–35.6)
MCV RBC AUTO: 87.8 FL (ref 75.5–95.3)
MONOCYTES # BLD AUTO: 0.6 K/UL (ref 2–10)
MONOCYTES NFR BLD AUTO: 11.2 % (ref 0–11)
NEUTROPHILS # BLD AUTO: 3.6 K/UL (ref 1.8–8.9)
NEUTROPHILS NFR BLD AUTO: 63.8 % (ref 38.5–71.5)
NITRITE UR QL STRIP: POSITIVE
PH UR STRIP: 8.5 [PH] (ref 5–8)
PLATELET # BLD AUTO: 304 K/UL (ref 179–408)
POTASSIUM SERPL-SCNC: 4.7 MMOL/L (ref 3.5–5.1)
RBC # BLD AUTO: 4.1 MIL/UL (ref 3.63–4.92)
SP GR UR STRIP: 1.02 (ref 1–1.03)
TRI-PHOS CRY URNS QL MICRO: (no result) /HPF
UROBILINOGEN UR STRIP-MCNC: 0.2 E.U./DL
WBC # BLD AUTO: 5.6 K/UL (ref 3.8–11.8)
WBC #/AREA URNS HPF: (no result) /HPF
WBC #/AREA URNS HPF: (no result) /HPF (ref 0–3)
WS STN SPEC: 7.9 G/DL (ref 6.4–8.2)

## 2020-08-31 PROCEDURE — G0378 HOSPITAL OBSERVATION PER HR: HCPCS

## 2020-08-31 PROCEDURE — A4663 DIALYSIS BLOOD PRESSURE CUFF: HCPCS

## 2020-09-01 VITALS — DIASTOLIC BLOOD PRESSURE: 38 MMHG | SYSTOLIC BLOOD PRESSURE: 98 MMHG

## 2020-09-01 VITALS — DIASTOLIC BLOOD PRESSURE: 60 MMHG | SYSTOLIC BLOOD PRESSURE: 133 MMHG

## 2020-09-01 VITALS — DIASTOLIC BLOOD PRESSURE: 73 MMHG | SYSTOLIC BLOOD PRESSURE: 139 MMHG

## 2020-09-01 VITALS — DIASTOLIC BLOOD PRESSURE: 54 MMHG | SYSTOLIC BLOOD PRESSURE: 122 MMHG

## 2020-09-01 VITALS — SYSTOLIC BLOOD PRESSURE: 146 MMHG | DIASTOLIC BLOOD PRESSURE: 71 MMHG

## 2020-09-01 VITALS — SYSTOLIC BLOOD PRESSURE: 130 MMHG | DIASTOLIC BLOOD PRESSURE: 62 MMHG

## 2020-09-01 LAB
ALP SERPL-CCNC: 58 U/L (ref 50–136)
ALT SERPL W/O P-5'-P-CCNC: 9 U/L (ref 14–59)
AST SERPL-CCNC: 12 U/L (ref 15–37)
BASOPHILS # BLD AUTO: 0 K/UL (ref 0–8)
BASOPHILS NFR BLD AUTO: 0.6 % (ref 0–2)
BILIRUB SERPL-MCNC: 0.3 MG/DL (ref 0.2–1)
BUN SERPL-MCNC: 30 MG/DL (ref 7–18)
CHLORIDE SERPL-SCNC: 107 MMOL/L (ref 98–107)
CHOLEST SERPL-MCNC: 103 MG/DL (ref ?–200)
CO2 SERPL-SCNC: 20 MMOL/L (ref 21–32)
CREAT SERPL-MCNC: 1.2 MG/DL (ref 0.6–1.3)
EOSINOPHIL # BLD AUTO: 0 K/UL (ref 0–0.7)
EOSINOPHIL NFR BLD AUTO: 0.6 % (ref 0–7)
GLUCOSE SERPL-MCNC: 103 MG/DL (ref 74–106)
HCT VFR BLD AUTO: 35.5 % (ref 31.2–41.9)
HDLC SERPL-MCNC: 46 MG/DL (ref 40–60)
HGB BLD-MCNC: 11.6 G/DL (ref 10.9–14.3)
LYMPHOCYTES # BLD AUTO: 2 K/UL (ref 20–40)
LYMPHOCYTES NFR BLD AUTO: 28.7 % (ref 20.5–51.5)
LYMPHOCYTES NFR BLD MANUAL: 29 % (ref 20–40)
MAGNESIUM SERPL-MCNC: 1.5 MG/DL (ref 1.8–2.4)
MCH RBC QN AUTO: 29.1 UUG (ref 24.7–32.8)
MCHC RBC AUTO-ENTMCNC: 33 G/DL (ref 32.3–35.6)
MCV RBC AUTO: 89.3 FL (ref 75.5–95.3)
MONOCYTES # BLD AUTO: 1.1 K/UL (ref 2–10)
MONOCYTES NFR BLD AUTO: 15.4 % (ref 0–11)
MONOCYTES NFR BLD MANUAL: 17 % (ref 2–10)
NEUTROPHILS # BLD AUTO: 3.8 K/UL (ref 1.8–8.9)
NEUTROPHILS NFR BLD AUTO: 54.7 % (ref 38.5–71.5)
NEUTS SEG NFR BLD MANUAL: 54 % (ref 42–75)
PHOSPHATE SERPL-MCNC: 3.5 MG/DL (ref 2.5–4.9)
PLATELET # BLD AUTO: 267 K/UL (ref 179–408)
POTASSIUM SERPL-SCNC: 4.3 MMOL/L (ref 3.5–5.1)
RBC # BLD AUTO: 3.98 MIL/UL (ref 3.63–4.92)
TRIGL SERPL-MCNC: 81 MG/DL (ref 30–150)
TSH SERPL DL<=0.005 MIU/L-ACNC: 0.79 MIU/ML (ref 0.36–3.74)
WBC # BLD AUTO: 7 K/UL (ref 3.8–11.8)
WS STN SPEC: 7.6 G/DL (ref 6.4–8.2)

## 2020-09-01 RX ADMIN — SODIUM CHLORIDE PRN MLS/HR: 0.45 INJECTION, SOLUTION INTRAVENOUS at 04:37

## 2020-09-01 RX ADMIN — BENAZEPRIL HYDROCHLORIDE SCH MG: 20 TABLET, FILM COATED ORAL at 09:22

## 2020-09-01 RX ADMIN — Medication SCH MLS/HR: at 11:51

## 2020-09-01 RX ADMIN — Medication SCH MLS/HR: at 20:32

## 2020-09-01 RX ADMIN — Medication SCH ML: at 17:06

## 2020-09-01 RX ADMIN — PANTOPRAZOLE SODIUM SCH MG: 40 TABLET, DELAYED RELEASE ORAL at 09:19

## 2020-09-01 RX ADMIN — METFORMIN HYDROCHLORIDE SCH MG: 500 TABLET ORAL at 09:19

## 2020-09-01 RX ADMIN — METRONIDAZOLE SCH MLS/HR: 500 SOLUTION INTRAVENOUS at 14:23

## 2020-09-01 RX ADMIN — ASPIRIN SCH MG: 81 TABLET, CHEWABLE ORAL at 09:19

## 2020-09-01 RX ADMIN — METFORMIN HYDROCHLORIDE SCH MG: 500 TABLET ORAL at 17:06

## 2020-09-01 RX ADMIN — MAGNESIUM SULFATE IN DEXTROSE SCH MLS/HR: 10 INJECTION, SOLUTION INTRAVENOUS at 10:34

## 2020-09-01 RX ADMIN — MAGNESIUM SULFATE IN DEXTROSE SCH MLS/HR: 10 INJECTION, SOLUTION INTRAVENOUS at 11:23

## 2020-09-01 RX ADMIN — METRONIDAZOLE SCH MLS/HR: 500 SOLUTION INTRAVENOUS at 22:54

## 2020-09-01 RX ADMIN — SIMVASTATIN SCH MG: 10 TABLET, FILM COATED ORAL at 20:32

## 2020-09-02 VITALS — SYSTOLIC BLOOD PRESSURE: 110 MMHG | DIASTOLIC BLOOD PRESSURE: 45 MMHG

## 2020-09-02 VITALS — SYSTOLIC BLOOD PRESSURE: 116 MMHG | DIASTOLIC BLOOD PRESSURE: 57 MMHG

## 2020-09-02 VITALS — DIASTOLIC BLOOD PRESSURE: 49 MMHG | SYSTOLIC BLOOD PRESSURE: 126 MMHG

## 2020-09-02 VITALS — SYSTOLIC BLOOD PRESSURE: 119 MMHG | DIASTOLIC BLOOD PRESSURE: 68 MMHG

## 2020-09-02 LAB
BASOPHILS # BLD AUTO: 0 K/UL (ref 0–8)
BASOPHILS NFR BLD AUTO: 0.6 % (ref 0–2)
BUN SERPL-MCNC: 22 MG/DL (ref 7–18)
CHLORIDE SERPL-SCNC: 104 MMOL/L (ref 98–107)
CO2 SERPL-SCNC: 20 MMOL/L (ref 21–32)
CREAT SERPL-MCNC: 1 MG/DL (ref 0.6–1.3)
EOSINOPHIL # BLD AUTO: 0 K/UL (ref 0–0.7)
EOSINOPHIL NFR BLD AUTO: 1.4 % (ref 0–7)
GLUCOSE SERPL-MCNC: 217 MG/DL (ref 74–106)
HCT VFR BLD AUTO: 31.5 % (ref 31.2–41.9)
HGB BLD-MCNC: 10.3 G/DL (ref 10.9–14.3)
LYMPHOCYTES # BLD AUTO: 1.3 K/UL (ref 20–40)
LYMPHOCYTES NFR BLD AUTO: 35.8 % (ref 20.5–51.5)
MAGNESIUM SERPL-MCNC: 1.7 MG/DL (ref 1.8–2.4)
MCH RBC QN AUTO: 28.8 UUG (ref 24.7–32.8)
MCHC RBC AUTO-ENTMCNC: 33 G/DL (ref 32.3–35.6)
MCV RBC AUTO: 87.9 FL (ref 75.5–95.3)
MONOCYTES # BLD AUTO: 0.5 K/UL (ref 2–10)
MONOCYTES NFR BLD AUTO: 12.8 % (ref 0–11)
NEUTROPHILS # BLD AUTO: 1.8 K/UL (ref 1.8–8.9)
NEUTROPHILS NFR BLD AUTO: 49.4 % (ref 38.5–71.5)
PHOSPHATE SERPL-MCNC: 3.5 MG/DL (ref 2.5–4.9)
PLATELET # BLD AUTO: 261 K/UL (ref 179–408)
POTASSIUM SERPL-SCNC: 3.8 MMOL/L (ref 3.5–5.1)
RBC # BLD AUTO: 3.59 MIL/UL (ref 3.63–4.92)
WBC # BLD AUTO: 3.6 K/UL (ref 3.8–11.8)

## 2020-09-02 RX ADMIN — METRONIDAZOLE SCH MLS/HR: 500 SOLUTION INTRAVENOUS at 05:09

## 2020-09-02 RX ADMIN — METRONIDAZOLE SCH MLS/HR: 500 SOLUTION INTRAVENOUS at 22:05

## 2020-09-02 RX ADMIN — METFORMIN HYDROCHLORIDE SCH MG: 500 TABLET ORAL at 08:26

## 2020-09-02 RX ADMIN — METRONIDAZOLE SCH MLS/HR: 500 SOLUTION INTRAVENOUS at 14:24

## 2020-09-02 RX ADMIN — PANTOPRAZOLE SODIUM SCH MG: 40 TABLET, DELAYED RELEASE ORAL at 06:26

## 2020-09-02 RX ADMIN — Medication SCH ML: at 08:26

## 2020-09-02 RX ADMIN — SIMVASTATIN SCH MG: 10 TABLET, FILM COATED ORAL at 20:03

## 2020-09-02 RX ADMIN — ENOXAPARIN SODIUM SCH MG: 30 INJECTION SUBCUTANEOUS at 12:22

## 2020-09-02 RX ADMIN — ASPIRIN SCH MG: 81 TABLET, CHEWABLE ORAL at 08:26

## 2020-09-02 RX ADMIN — Medication SCH MLS/HR: at 20:03

## 2020-09-02 RX ADMIN — Medication SCH MLS/HR: at 08:29

## 2020-09-02 RX ADMIN — SODIUM CHLORIDE PRN MLS/HR: 0.45 INJECTION, SOLUTION INTRAVENOUS at 00:53

## 2020-09-02 RX ADMIN — BENAZEPRIL HYDROCHLORIDE SCH MG: 20 TABLET, FILM COATED ORAL at 08:29

## 2020-09-02 RX ADMIN — Medication SCH ML: at 17:28

## 2020-09-02 RX ADMIN — METFORMIN HYDROCHLORIDE SCH MG: 500 TABLET ORAL at 17:26

## 2020-09-02 RX ADMIN — Medication SCH ML: at 13:00

## 2020-09-03 VITALS — DIASTOLIC BLOOD PRESSURE: 55 MMHG | SYSTOLIC BLOOD PRESSURE: 122 MMHG

## 2020-09-03 VITALS — SYSTOLIC BLOOD PRESSURE: 98 MMHG | DIASTOLIC BLOOD PRESSURE: 40 MMHG

## 2020-09-03 VITALS — DIASTOLIC BLOOD PRESSURE: 59 MMHG | SYSTOLIC BLOOD PRESSURE: 133 MMHG

## 2020-09-03 VITALS — SYSTOLIC BLOOD PRESSURE: 129 MMHG | DIASTOLIC BLOOD PRESSURE: 55 MMHG

## 2020-09-03 LAB
BASOPHILS # BLD AUTO: 0 K/UL (ref 0–8)
BASOPHILS NFR BLD AUTO: 0.3 % (ref 0–2)
BUN SERPL-MCNC: 23 MG/DL (ref 7–18)
CHLORIDE SERPL-SCNC: 107 MMOL/L (ref 98–107)
CO2 SERPL-SCNC: 22 MMOL/L (ref 21–32)
CREAT SERPL-MCNC: 1 MG/DL (ref 0.6–1.3)
EOSINOPHIL # BLD AUTO: 0.1 K/UL (ref 0–0.7)
EOSINOPHIL NFR BLD AUTO: 2.1 % (ref 0–7)
GLUCOSE SERPL-MCNC: 169 MG/DL (ref 74–106)
HCT VFR BLD AUTO: 29.1 % (ref 31.2–41.9)
HGB BLD-MCNC: 9.7 G/DL (ref 10.9–14.3)
LYMPHOCYTES # BLD AUTO: 1.1 K/UL (ref 20–40)
LYMPHOCYTES NFR BLD AUTO: 27.3 % (ref 20.5–51.5)
MAGNESIUM SERPL-MCNC: 1.5 MG/DL (ref 1.8–2.4)
MCH RBC QN AUTO: 28.9 UUG (ref 24.7–32.8)
MCHC RBC AUTO-ENTMCNC: 34 G/DL (ref 32.3–35.6)
MCV RBC AUTO: 86.4 FL (ref 75.5–95.3)
MONOCYTES # BLD AUTO: 0.4 K/UL (ref 2–10)
MONOCYTES NFR BLD AUTO: 9.4 % (ref 0–11)
NEUTROPHILS # BLD AUTO: 2.4 K/UL (ref 1.8–8.9)
NEUTROPHILS NFR BLD AUTO: 60.9 % (ref 38.5–71.5)
PHOSPHATE SERPL-MCNC: 2.7 MG/DL (ref 2.5–4.9)
PLATELET # BLD AUTO: 247 K/UL (ref 179–408)
POTASSIUM SERPL-SCNC: 4.1 MMOL/L (ref 3.5–5.1)
RBC # BLD AUTO: 3.36 MIL/UL (ref 3.63–4.92)
WBC # BLD AUTO: 3.9 K/UL (ref 3.8–11.8)

## 2020-09-03 RX ADMIN — METFORMIN HYDROCHLORIDE SCH MG: 500 TABLET ORAL at 08:00

## 2020-09-03 RX ADMIN — SODIUM CHLORIDE PRN MLS/HR: 0.45 INJECTION, SOLUTION INTRAVENOUS at 00:43

## 2020-09-03 RX ADMIN — BENAZEPRIL HYDROCHLORIDE SCH MG: 20 TABLET, FILM COATED ORAL at 08:00

## 2020-09-03 RX ADMIN — METRONIDAZOLE SCH MLS/HR: 500 SOLUTION INTRAVENOUS at 13:11

## 2020-09-03 RX ADMIN — Medication SCH ML: at 12:21

## 2020-09-03 RX ADMIN — MAGNESIUM SULFATE IN DEXTROSE SCH MLS/HR: 10 INJECTION, SOLUTION INTRAVENOUS at 09:50

## 2020-09-03 RX ADMIN — Medication SCH ML: at 16:04

## 2020-09-03 RX ADMIN — PANTOPRAZOLE SODIUM SCH MG: 40 TABLET, DELAYED RELEASE ORAL at 06:03

## 2020-09-03 RX ADMIN — ENOXAPARIN SODIUM SCH MG: 30 INJECTION SUBCUTANEOUS at 08:01

## 2020-09-03 RX ADMIN — ASPIRIN SCH MG: 81 TABLET, CHEWABLE ORAL at 08:00

## 2020-09-03 RX ADMIN — METRONIDAZOLE SCH MLS/HR: 500 SOLUTION INTRAVENOUS at 05:07

## 2020-09-03 RX ADMIN — Medication SCH ML: at 08:17

## 2020-09-03 RX ADMIN — Medication SCH MLS/HR: at 08:02

## 2020-09-03 RX ADMIN — METFORMIN HYDROCHLORIDE SCH MG: 500 TABLET ORAL at 16:03

## 2020-09-03 RX ADMIN — MAGNESIUM SULFATE IN DEXTROSE SCH MLS/HR: 10 INJECTION, SOLUTION INTRAVENOUS at 10:22

## 2020-09-05 ENCOUNTER — HOSPITAL ENCOUNTER (INPATIENT)
Dept: HOSPITAL 12 - ER | Age: 85
LOS: 4 days | Discharge: HOME HEALTH | DRG: 689 | End: 2020-09-09
Payer: MEDICARE

## 2020-09-05 VITALS — WEIGHT: 116 LBS | BODY MASS INDEX: 21.35 KG/M2 | HEIGHT: 62 IN

## 2020-09-05 DIAGNOSIS — M19.90: ICD-10-CM

## 2020-09-05 DIAGNOSIS — Z16.12: ICD-10-CM

## 2020-09-05 DIAGNOSIS — B96.20: ICD-10-CM

## 2020-09-05 DIAGNOSIS — M54.30: ICD-10-CM

## 2020-09-05 DIAGNOSIS — N31.9: ICD-10-CM

## 2020-09-05 DIAGNOSIS — D72.819: ICD-10-CM

## 2020-09-05 DIAGNOSIS — D68.69: ICD-10-CM

## 2020-09-05 DIAGNOSIS — Z79.84: ICD-10-CM

## 2020-09-05 DIAGNOSIS — N17.0: ICD-10-CM

## 2020-09-05 DIAGNOSIS — N39.0: Primary | ICD-10-CM

## 2020-09-05 DIAGNOSIS — E44.0: ICD-10-CM

## 2020-09-05 DIAGNOSIS — E11.65: ICD-10-CM

## 2020-09-05 DIAGNOSIS — Z74.09: ICD-10-CM

## 2020-09-05 DIAGNOSIS — M62.82: ICD-10-CM

## 2020-09-05 DIAGNOSIS — D63.8: ICD-10-CM

## 2020-09-05 DIAGNOSIS — Z87.440: ICD-10-CM

## 2020-09-05 DIAGNOSIS — Z79.82: ICD-10-CM

## 2020-09-05 DIAGNOSIS — R62.7: ICD-10-CM

## 2020-09-05 DIAGNOSIS — G92: ICD-10-CM

## 2020-09-05 DIAGNOSIS — E86.0: ICD-10-CM

## 2020-09-05 DIAGNOSIS — F03.90: ICD-10-CM

## 2020-09-05 DIAGNOSIS — E78.5: ICD-10-CM

## 2020-09-05 DIAGNOSIS — I10: ICD-10-CM

## 2020-09-05 LAB
ALP SERPL-CCNC: 44 U/L (ref 50–136)
ALT SERPL W/O P-5'-P-CCNC: 28 U/L (ref 14–59)
APPEARANCE UR: (no result)
AST SERPL-CCNC: 50 U/L (ref 15–37)
BASOPHILS # BLD AUTO: 0 K/UL (ref 0–8)
BASOPHILS NFR BLD AUTO: 1 % (ref 0–2)
BILIRUB DIRECT SERPL-MCNC: 0.1 MG/DL (ref 0–0.2)
BILIRUB SERPL-MCNC: 0.3 MG/DL (ref 0.2–1)
BILIRUB UR QL STRIP: NEGATIVE
BUN SERPL-MCNC: 20 MG/DL (ref 7–18)
CHLORIDE SERPL-SCNC: 106 MMOL/L (ref 98–107)
CO2 SERPL-SCNC: 24 MMOL/L (ref 21–32)
COLOR UR: YELLOW
CREAT SERPL-MCNC: 0.8 MG/DL (ref 0.6–1.3)
DEPRECATED SQUAMOUS URNS QL MICRO: (no result) /HPF
EOSINOPHIL # BLD AUTO: 0.1 K/UL (ref 0–0.7)
EOSINOPHIL NFR BLD AUTO: 3.8 % (ref 0–7)
GLUCOSE SERPL-MCNC: 156 MG/DL (ref 74–106)
GLUCOSE UR STRIP-MCNC: NEGATIVE MG/DL
HCT VFR BLD AUTO: 32 % (ref 31.2–41.9)
HGB BLD-MCNC: 10.4 G/DL (ref 10.9–14.3)
HGB UR QL STRIP: (no result)
KETONES UR STRIP-MCNC: (no result) MG/DL
LEUKOCYTE ESTERASE UR QL STRIP: (no result)
LYMPHOCYTES # BLD AUTO: 1.3 K/UL (ref 20–40)
LYMPHOCYTES NFR BLD AUTO: 41.5 % (ref 20.5–51.5)
MCH RBC QN AUTO: 28.4 UUG (ref 24.7–32.8)
MCHC RBC AUTO-ENTMCNC: 33 G/DL (ref 32.3–35.6)
MCV RBC AUTO: 87.2 FL (ref 75.5–95.3)
MONOCYTES # BLD AUTO: 0.2 K/UL (ref 2–10)
MONOCYTES NFR BLD AUTO: 8.1 % (ref 0–11)
NEUTROPHILS # BLD AUTO: 1.4 K/UL (ref 1.8–8.9)
NEUTROPHILS NFR BLD AUTO: 45.6 % (ref 38.5–71.5)
NITRITE UR QL STRIP: POSITIVE
PH UR STRIP: 5.5 [PH] (ref 5–8)
PLATELET # BLD AUTO: 267 K/UL (ref 179–408)
POTASSIUM SERPL-SCNC: 3.9 MMOL/L (ref 3.5–5.1)
RBC # BLD AUTO: 3.67 MIL/UL (ref 3.63–4.92)
RBC #/AREA URNS HPF: (no result) /HPF (ref 0–3)
SP GR UR STRIP: 1.02 (ref 1–1.03)
UROBILINOGEN UR STRIP-MCNC: 0.2 E.U./DL
WBC # BLD AUTO: 3 K/UL (ref 3.8–11.8)
WBC #/AREA URNS HPF: (no result) /HPF
WBC #/AREA URNS HPF: (no result) /HPF (ref 0–3)
WS STN SPEC: 6.6 G/DL (ref 6.4–8.2)

## 2020-09-05 PROCEDURE — G0378 HOSPITAL OBSERVATION PER HR: HCPCS

## 2020-09-05 PROCEDURE — A9150 MISC/EXPER NON-PRESCRIPT DRU: HCPCS

## 2020-09-05 NOTE — NUR
900 ml of Tea colored urine emptied from johnson bag at this time, patient had 
samll BM at this time

## 2020-09-06 VITALS — SYSTOLIC BLOOD PRESSURE: 156 MMHG | DIASTOLIC BLOOD PRESSURE: 66 MMHG

## 2020-09-06 VITALS — SYSTOLIC BLOOD PRESSURE: 136 MMHG | DIASTOLIC BLOOD PRESSURE: 59 MMHG

## 2020-09-06 VITALS — DIASTOLIC BLOOD PRESSURE: 60 MMHG | SYSTOLIC BLOOD PRESSURE: 130 MMHG

## 2020-09-06 VITALS — SYSTOLIC BLOOD PRESSURE: 117 MMHG | DIASTOLIC BLOOD PRESSURE: 51 MMHG

## 2020-09-06 VITALS — DIASTOLIC BLOOD PRESSURE: 68 MMHG | SYSTOLIC BLOOD PRESSURE: 140 MMHG

## 2020-09-06 LAB
BASOPHILS # BLD AUTO: 0 K/UL (ref 0–8)
BASOPHILS NFR BLD AUTO: 0.8 % (ref 0–2)
BUN SERPL-MCNC: 16 MG/DL (ref 7–18)
CHLORIDE SERPL-SCNC: 105 MMOL/L (ref 98–107)
CHOLEST SERPL-MCNC: 100 MG/DL (ref ?–200)
CK SERPL-CCNC: 205 U/L (ref 26–192)
CO2 SERPL-SCNC: 27 MMOL/L (ref 21–32)
CREAT SERPL-MCNC: 0.9 MG/DL (ref 0.6–1.3)
EOSINOPHIL # BLD AUTO: 0.2 K/UL (ref 0–0.7)
EOSINOPHIL NFR BLD AUTO: 4.8 % (ref 0–7)
GLUCOSE SERPL-MCNC: 141 MG/DL (ref 74–106)
HCT VFR BLD AUTO: 34.3 % (ref 31.2–41.9)
HDLC SERPL-MCNC: 36 MG/DL (ref 40–60)
HGB BLD-MCNC: 11 G/DL (ref 10.9–14.3)
LYMPHOCYTES # BLD AUTO: 1.5 K/UL (ref 20–40)
LYMPHOCYTES NFR BLD AUTO: 46.6 % (ref 20.5–51.5)
MAGNESIUM SERPL-MCNC: 1.5 MG/DL (ref 1.8–2.4)
MCH RBC QN AUTO: 28.3 UUG (ref 24.7–32.8)
MCHC RBC AUTO-ENTMCNC: 32 G/DL (ref 32.3–35.6)
MCV RBC AUTO: 87.9 FL (ref 75.5–95.3)
MONOCYTES # BLD AUTO: 0.4 K/UL (ref 2–10)
MONOCYTES NFR BLD AUTO: 13.3 % (ref 0–11)
NEUTROPHILS # BLD AUTO: 1.1 K/UL (ref 1.8–8.9)
NEUTROPHILS NFR BLD AUTO: 34.5 % (ref 38.5–71.5)
PHOSPHATE SERPL-MCNC: 3.4 MG/DL (ref 2.5–4.9)
PLATELET # BLD AUTO: 240 K/UL (ref 179–408)
POTASSIUM SERPL-SCNC: 3.5 MMOL/L (ref 3.5–5.1)
RBC # BLD AUTO: 3.9 MIL/UL (ref 3.63–4.92)
TRIGL SERPL-MCNC: 92 MG/DL (ref 30–150)
TSH SERPL DL<=0.005 MIU/L-ACNC: 1.86 MIU/ML (ref 0.36–3.74)
WBC # BLD AUTO: 3.3 K/UL (ref 3.8–11.8)

## 2020-09-06 RX ADMIN — Medication SCH MG: at 10:31

## 2020-09-06 RX ADMIN — ASPIRIN SCH MG: 81 TABLET, CHEWABLE ORAL at 07:58

## 2020-09-06 RX ADMIN — ATORVASTATIN CALCIUM SCH MG: 10 TABLET, FILM COATED ORAL at 20:52

## 2020-09-06 RX ADMIN — METFORMIN HYDROCHLORIDE SCH MG: 500 TABLET ORAL at 07:56

## 2020-09-06 RX ADMIN — PIPERACILLIN SODIUM AND TAZOBACTAM SODIUM SCH MLS/HR: .375; 3 INJECTION, POWDER, LYOPHILIZED, FOR SOLUTION INTRAVENOUS at 21:01

## 2020-09-06 RX ADMIN — METFORMIN HYDROCHLORIDE SCH MG: 500 TABLET ORAL at 16:28

## 2020-09-06 RX ADMIN — PIPERACILLIN SODIUM AND TAZOBACTAM SODIUM SCH MLS/HR: .375; 3 INJECTION, POWDER, LYOPHILIZED, FOR SOLUTION INTRAVENOUS at 13:29

## 2020-09-06 RX ADMIN — METRONIDAZOLE SCH MG: 500 TABLET ORAL at 16:28

## 2020-09-06 RX ADMIN — SODIUM CHLORIDE PRN MLS/HR: 0.9 INJECTION, SOLUTION INTRAVENOUS at 00:31

## 2020-09-06 RX ADMIN — PIPERACILLIN SODIUM AND TAZOBACTAM SODIUM SCH MLS/HR: .375; 3 INJECTION, POWDER, LYOPHILIZED, FOR SOLUTION INTRAVENOUS at 06:48

## 2020-09-06 RX ADMIN — PANTOPRAZOLE SODIUM SCH MG: 40 TABLET, DELAYED RELEASE ORAL at 06:22

## 2020-09-06 RX ADMIN — METRONIDAZOLE SCH MG: 500 TABLET ORAL at 07:56

## 2020-09-06 RX ADMIN — ENOXAPARIN SODIUM SCH MG: 40 INJECTION SUBCUTANEOUS at 07:57

## 2020-09-06 RX ADMIN — SODIUM CHLORIDE PRN MLS/HR: 0.9 INJECTION, SOLUTION INTRAVENOUS at 20:56

## 2020-09-06 RX ADMIN — LOSARTAN POTASSIUM SCH MG: 50 TABLET, FILM COATED ORAL at 07:58

## 2020-09-06 NOTE — NUR
Received patient asleep. Patient shows no signs or symptoms of distress at this time. Vital 
signs stable. No fever noted at this time. Safety measures in place. Will continue to 
monitor patient.

## 2020-09-06 NOTE — NUR
Pt. admitted to [facility]  , under care of Dr. Drew

Belongs List completed sent with patient, report given to Melecio

## 2020-09-06 NOTE — NUR
Pt alert to name.  Pt has not teeth changed texture to puree.  Implemented skin management, 
fall and aspiration precautions.  Quigley catheter  intact draining cloudy urine.  IF on r ac 
intact.  IVF infusing as ordered.  Call light is within reach.

## 2020-09-06 NOTE — NUR
Received patient awake but confused. Patient shows no signs or symptoms of distress at this 
time. No fever noted at this time. Quigley draining to gravity and is patent. Safety measures 
in place. Admitting orders received and will be carried out. Will continue to monitor 
patient.

## 2020-09-07 VITALS — DIASTOLIC BLOOD PRESSURE: 64 MMHG | SYSTOLIC BLOOD PRESSURE: 138 MMHG

## 2020-09-07 VITALS — SYSTOLIC BLOOD PRESSURE: 150 MMHG | DIASTOLIC BLOOD PRESSURE: 43 MMHG

## 2020-09-07 VITALS — DIASTOLIC BLOOD PRESSURE: 60 MMHG | SYSTOLIC BLOOD PRESSURE: 158 MMHG

## 2020-09-07 VITALS — SYSTOLIC BLOOD PRESSURE: 144 MMHG | DIASTOLIC BLOOD PRESSURE: 72 MMHG

## 2020-09-07 LAB
BASOPHILS # BLD AUTO: 0 K/UL (ref 0–8)
BASOPHILS NFR BLD AUTO: 0.7 % (ref 0–2)
BUN SERPL-MCNC: 16 MG/DL (ref 7–18)
CHLORIDE SERPL-SCNC: 108 MMOL/L (ref 98–107)
CK SERPL-CCNC: 77 U/L (ref 26–192)
CO2 SERPL-SCNC: 23 MMOL/L (ref 21–32)
CREAT SERPL-MCNC: 0.9 MG/DL (ref 0.6–1.3)
EOSINOPHIL # BLD AUTO: 0.1 K/UL (ref 0–0.7)
EOSINOPHIL NFR BLD AUTO: 3.4 % (ref 0–7)
GLUCOSE SERPL-MCNC: 202 MG/DL (ref 74–106)
HCT VFR BLD AUTO: 31 % (ref 31.2–41.9)
HGB BLD-MCNC: 10.1 G/DL (ref 10.9–14.3)
LYMPHOCYTES # BLD AUTO: 1.5 K/UL (ref 20–40)
LYMPHOCYTES NFR BLD AUTO: 43.8 % (ref 20.5–51.5)
MAGNESIUM SERPL-MCNC: 1.3 MG/DL (ref 1.8–2.4)
MCH RBC QN AUTO: 28.6 UUG (ref 24.7–32.8)
MCHC RBC AUTO-ENTMCNC: 33 G/DL (ref 32.3–35.6)
MCV RBC AUTO: 87.5 FL (ref 75.5–95.3)
MONOCYTES # BLD AUTO: 0.2 K/UL (ref 2–10)
MONOCYTES NFR BLD AUTO: 6.5 % (ref 0–11)
NEUTROPHILS # BLD AUTO: 1.5 K/UL (ref 1.8–8.9)
NEUTROPHILS NFR BLD AUTO: 45.6 % (ref 38.5–71.5)
PHOSPHATE SERPL-MCNC: 2.5 MG/DL (ref 2.5–4.9)
PLATELET # BLD AUTO: 267 K/UL (ref 179–408)
POTASSIUM SERPL-SCNC: 4 MMOL/L (ref 3.5–5.1)
RBC # BLD AUTO: 3.54 MIL/UL (ref 3.63–4.92)
WBC # BLD AUTO: 3.3 K/UL (ref 3.8–11.8)

## 2020-09-07 RX ADMIN — Medication SCH EACH: at 20:40

## 2020-09-07 RX ADMIN — PIPERACILLIN SODIUM AND TAZOBACTAM SODIUM SCH MLS/HR: .375; 3 INJECTION, POWDER, LYOPHILIZED, FOR SOLUTION INTRAVENOUS at 13:50

## 2020-09-07 RX ADMIN — SODIUM CHLORIDE SCH MLS/HR: 9 INJECTION, SOLUTION INTRAVENOUS at 22:07

## 2020-09-07 RX ADMIN — METFORMIN HYDROCHLORIDE SCH MG: 500 TABLET ORAL at 16:46

## 2020-09-07 RX ADMIN — Medication SCH MG: at 09:40

## 2020-09-07 RX ADMIN — PANTOPRAZOLE SODIUM SCH MG: 40 TABLET, DELAYED RELEASE ORAL at 06:17

## 2020-09-07 RX ADMIN — SODIUM CHLORIDE SCH MLS/HR: 9 INJECTION, SOLUTION INTRAVENOUS at 23:26

## 2020-09-07 RX ADMIN — MAGNESIUM SULFATE IN DEXTROSE SCH MLS/HR: 10 INJECTION, SOLUTION INTRAVENOUS at 19:55

## 2020-09-07 RX ADMIN — ASPIRIN SCH MG: 81 TABLET, CHEWABLE ORAL at 09:45

## 2020-09-07 RX ADMIN — SODIUM CHLORIDE PRN MLS/HR: 0.9 INJECTION, SOLUTION INTRAVENOUS at 19:00

## 2020-09-07 RX ADMIN — SODIUM CHLORIDE PRN UNIT: 9 INJECTION, SOLUTION INTRAVENOUS at 20:42

## 2020-09-07 RX ADMIN — ENOXAPARIN SODIUM SCH MG: 40 INJECTION SUBCUTANEOUS at 09:55

## 2020-09-07 RX ADMIN — METFORMIN HYDROCHLORIDE SCH MG: 500 TABLET ORAL at 09:40

## 2020-09-07 RX ADMIN — MAGNESIUM SULFATE IN DEXTROSE SCH MLS/HR: 10 INJECTION, SOLUTION INTRAVENOUS at 18:32

## 2020-09-07 RX ADMIN — LOSARTAN POTASSIUM SCH MG: 50 TABLET, FILM COATED ORAL at 09:51

## 2020-09-07 RX ADMIN — ATORVASTATIN CALCIUM SCH MG: 10 TABLET, FILM COATED ORAL at 20:39

## 2020-09-07 RX ADMIN — PIPERACILLIN SODIUM AND TAZOBACTAM SODIUM SCH MLS/HR: .375; 3 INJECTION, POWDER, LYOPHILIZED, FOR SOLUTION INTRAVENOUS at 05:02

## 2020-09-07 NOTE — NUR
Received patient in bed. No signs or symptoms of acute distress noted at this time. Patient 
is on RA no signs or symptoms of SOB or pain at this time. Patient is confused. IV patent 
and intact infusing fluids per order. Quigley in place draining via gravity. Bed is low and 
locked. Bed alarm on and safety measures in place. Will continue to monitor.

## 2020-09-07 NOTE — NUR
Pt in bed asleep, arousable to name and touch, confused. On RA with no SOB or distress noted 
at this time. IV on right AC 22g flushed and patent. Quigley catheter in place draining 
slightly cloudy yellow urine. Bed locked in lowest position with siderails 3x up, call light 
within reach.

## 2020-09-07 NOTE — NUR
Patient shows no signs or symptoms of distress at this time. Vital signs stable. No fever 
noted at this time. Safety measures in place. Patient to be endorsed to day shift nurse in 
stable condition.

## 2020-09-08 VITALS — DIASTOLIC BLOOD PRESSURE: 62 MMHG | SYSTOLIC BLOOD PRESSURE: 150 MMHG

## 2020-09-08 VITALS — DIASTOLIC BLOOD PRESSURE: 118 MMHG | SYSTOLIC BLOOD PRESSURE: 142 MMHG

## 2020-09-08 VITALS — DIASTOLIC BLOOD PRESSURE: 51 MMHG | SYSTOLIC BLOOD PRESSURE: 122 MMHG

## 2020-09-08 VITALS — DIASTOLIC BLOOD PRESSURE: 60 MMHG | SYSTOLIC BLOOD PRESSURE: 139 MMHG

## 2020-09-08 VITALS — SYSTOLIC BLOOD PRESSURE: 127 MMHG | DIASTOLIC BLOOD PRESSURE: 86 MMHG

## 2020-09-08 VITALS — DIASTOLIC BLOOD PRESSURE: 60 MMHG | SYSTOLIC BLOOD PRESSURE: 153 MMHG

## 2020-09-08 LAB
BASOPHILS # BLD AUTO: 0 K/UL (ref 0–8)
BASOPHILS NFR BLD AUTO: 0.4 % (ref 0–2)
BUN SERPL-MCNC: 15 MG/DL (ref 7–18)
CHLORIDE SERPL-SCNC: 106 MMOL/L (ref 98–107)
CO2 SERPL-SCNC: 27 MMOL/L (ref 21–32)
CREAT SERPL-MCNC: 1 MG/DL (ref 0.6–1.3)
EOSINOPHIL # BLD AUTO: 0.1 K/UL (ref 0–0.7)
EOSINOPHIL NFR BLD AUTO: 2.3 % (ref 0–7)
GLUCOSE SERPL-MCNC: 222 MG/DL (ref 74–106)
HCT VFR BLD AUTO: 31.4 % (ref 31.2–41.9)
HGB BLD-MCNC: 10.3 G/DL (ref 10.9–14.3)
LYMPHOCYTES # BLD AUTO: 1.3 K/UL (ref 20–40)
LYMPHOCYTES NFR BLD AUTO: 29.7 % (ref 20.5–51.5)
MAGNESIUM SERPL-MCNC: 1.4 MG/DL (ref 1.8–2.4)
MCH RBC QN AUTO: 28.8 UUG (ref 24.7–32.8)
MCHC RBC AUTO-ENTMCNC: 33 G/DL (ref 32.3–35.6)
MCV RBC AUTO: 88.2 FL (ref 75.5–95.3)
MONOCYTES # BLD AUTO: 0.2 K/UL (ref 2–10)
MONOCYTES NFR BLD AUTO: 5.5 % (ref 0–11)
NEUTROPHILS # BLD AUTO: 2.6 K/UL (ref 1.8–8.9)
NEUTROPHILS NFR BLD AUTO: 62.1 % (ref 38.5–71.5)
PHOSPHATE SERPL-MCNC: 2.6 MG/DL (ref 2.5–4.9)
PLATELET # BLD AUTO: 235 K/UL (ref 179–408)
POTASSIUM SERPL-SCNC: 3.9 MMOL/L (ref 3.5–5.1)
RBC # BLD AUTO: 3.56 MIL/UL (ref 3.63–4.92)
WBC # BLD AUTO: 4.2 K/UL (ref 3.8–11.8)

## 2020-09-08 RX ADMIN — SODIUM CHLORIDE PRN UNIT: 9 INJECTION, SOLUTION INTRAVENOUS at 21:14

## 2020-09-08 RX ADMIN — Medication SCH EACH: at 06:39

## 2020-09-08 RX ADMIN — PANTOPRAZOLE SODIUM SCH MG: 40 TABLET, DELAYED RELEASE ORAL at 06:39

## 2020-09-08 RX ADMIN — ENOXAPARIN SODIUM SCH MG: 40 INJECTION SUBCUTANEOUS at 08:03

## 2020-09-08 RX ADMIN — Medication SCH EACH: at 11:44

## 2020-09-08 RX ADMIN — Medication SCH EACH: at 16:42

## 2020-09-08 RX ADMIN — LOSARTAN POTASSIUM SCH MG: 50 TABLET, FILM COATED ORAL at 08:01

## 2020-09-08 RX ADMIN — SODIUM CHLORIDE PRN UNIT: 9 INJECTION, SOLUTION INTRAVENOUS at 17:14

## 2020-09-08 RX ADMIN — Medication SCH MG: at 08:07

## 2020-09-08 RX ADMIN — SODIUM CHLORIDE PRN UNIT: 9 INJECTION, SOLUTION INTRAVENOUS at 12:11

## 2020-09-08 RX ADMIN — ASPIRIN SCH MG: 81 TABLET, CHEWABLE ORAL at 08:00

## 2020-09-08 RX ADMIN — METFORMIN HYDROCHLORIDE SCH MG: 500 TABLET ORAL at 08:00

## 2020-09-08 RX ADMIN — ATORVASTATIN CALCIUM SCH MG: 10 TABLET, FILM COATED ORAL at 21:01

## 2020-09-08 RX ADMIN — SODIUM CHLORIDE PRN MLS/HR: 0.9 INJECTION, SOLUTION INTRAVENOUS at 11:13

## 2020-09-08 RX ADMIN — SODIUM CHLORIDE PRN MLS/HR: 0.9 INJECTION, SOLUTION INTRAVENOUS at 21:35

## 2020-09-08 RX ADMIN — SODIUM CHLORIDE SCH MLS/HR: 9 INJECTION, SOLUTION INTRAVENOUS at 23:11

## 2020-09-08 RX ADMIN — Medication SCH EACH: at 21:10

## 2020-09-08 RX ADMIN — METFORMIN HYDROCHLORIDE SCH MG: 500 TABLET ORAL at 17:06

## 2020-09-08 NOTE — NUR
Patient shows no signs or symptoms of acute distress at this time. No fever noted. Patient 
slept through the night. IV right forearm patent and intact. Safety measures in place and 
will endorse to oncoming nurse.

## 2020-09-08 NOTE — NUR
Pt in bed awake, confused, on RA with no SOB or distress noted at this time. IV on right FA 
22g running NS @ 100cc. Legs are contracted. Quigley catheter in place. Bed locked in lowest 
position with siderails 3x up. Call light within reach

## 2020-09-09 VITALS — SYSTOLIC BLOOD PRESSURE: 141 MMHG | DIASTOLIC BLOOD PRESSURE: 71 MMHG

## 2020-09-09 VITALS — SYSTOLIC BLOOD PRESSURE: 141 MMHG | DIASTOLIC BLOOD PRESSURE: 57 MMHG

## 2020-09-09 VITALS — SYSTOLIC BLOOD PRESSURE: 150 MMHG | DIASTOLIC BLOOD PRESSURE: 62 MMHG

## 2020-09-09 LAB
BUN SERPL-MCNC: 17 MG/DL (ref 7–18)
CHLORIDE SERPL-SCNC: 108 MMOL/L (ref 98–107)
CO2 SERPL-SCNC: 29 MMOL/L (ref 21–32)
CREAT SERPL-MCNC: 0.9 MG/DL (ref 0.6–1.3)
GLUCOSE SERPL-MCNC: 142 MG/DL (ref 74–106)
POTASSIUM SERPL-SCNC: 4 MMOL/L (ref 3.5–5.1)

## 2020-09-09 RX ADMIN — ASPIRIN SCH MG: 81 TABLET, CHEWABLE ORAL at 08:19

## 2020-09-09 RX ADMIN — METFORMIN HYDROCHLORIDE SCH MG: 500 TABLET ORAL at 08:07

## 2020-09-09 RX ADMIN — SODIUM CHLORIDE PRN UNIT: 9 INJECTION, SOLUTION INTRAVENOUS at 18:10

## 2020-09-09 RX ADMIN — SODIUM CHLORIDE PRN UNIT: 9 INJECTION, SOLUTION INTRAVENOUS at 12:27

## 2020-09-09 RX ADMIN — LOSARTAN POTASSIUM SCH MG: 50 TABLET, FILM COATED ORAL at 08:19

## 2020-09-09 RX ADMIN — Medication SCH EACH: at 16:30

## 2020-09-09 RX ADMIN — ENOXAPARIN SODIUM SCH MG: 40 INJECTION SUBCUTANEOUS at 08:21

## 2020-09-09 RX ADMIN — SODIUM CHLORIDE SCH MLS/HR: 9 INJECTION, SOLUTION INTRAVENOUS at 06:05

## 2020-09-09 RX ADMIN — METFORMIN HYDROCHLORIDE SCH MG: 500 TABLET ORAL at 18:07

## 2020-09-09 RX ADMIN — Medication SCH EACH: at 06:35

## 2020-09-09 RX ADMIN — Medication SCH MG: at 08:07

## 2020-09-09 RX ADMIN — SODIUM CHLORIDE SCH MLS/HR: 9 INJECTION, SOLUTION INTRAVENOUS at 14:58

## 2020-09-09 RX ADMIN — PANTOPRAZOLE SODIUM SCH MG: 40 TABLET, DELAYED RELEASE ORAL at 06:05

## 2020-09-09 RX ADMIN — Medication SCH EACH: at 12:23

## 2020-09-09 NOTE — NUR
WOUND CARE CONSULT: PT SEEN FOR INCONTINENCE ASSOCIATED SKIN DAMAGE OVER PREVIOUS SACRAL 
SCARRING. PT NOTED TO BE SCRATCHING AT HER SKIN. PT IS COMBATIVE AT TIMES. RECOMMENDATIONS 
MADE FOR SKIN PROTECTION AND SKIN CARE. DISCUSSED WITH NURSING STAFF AND CHARGE ELSI DEGROOT IN 
AGREEMENT WITH PLAN OF CARE. 

-------------------------------------------------------------------------------

Addendum: 09/09/20 at 1113 by TIFFANY HAMPTON RN

-------------------------------------------------------------------------------

Amended: Links added.

## 2020-09-09 NOTE — NUR
pt. discharged. pt. picked up by ambulance. son is aware of pt.'s arrival. midline in R UA 
inserted and wrapped with kerlix. pt. leaving with johnson catheter in from admission. all 
discharge paperwork with pt. signed by 2 RNs. Pt. has no belongings.

## 2020-09-09 NOTE — NUR
received pt. resting in bed alert oriented to self. pt. appears in no distress. pt. appears 
to be resting comfortably. IV in R forearm 22 gauge intact patent running prescribed fluid. 
pt. on room air saturating well. pt. has tendency to scratch herself on occasion. Quigley 
catheter in place draining tea color urine. safety measures in place. call light within 
reach. will continue to monitor pt.

## 2020-09-09 NOTE — NUR
Pt slept intermittently throughout the night. No signs of distress. vital signs stable. 
safety precaution in place. Call light within reach

## 2021-03-05 ENCOUNTER — HOSPITAL ENCOUNTER (INPATIENT)
Dept: HOSPITAL 12 - ER | Age: 86
LOS: 3 days | Discharge: HOME HEALTH | DRG: 689 | End: 2021-03-08
Payer: MEDICARE

## 2021-03-05 VITALS — HEIGHT: 61 IN | WEIGHT: 115 LBS | BODY MASS INDEX: 21.71 KG/M2

## 2021-03-05 DIAGNOSIS — Z22.322: ICD-10-CM

## 2021-03-05 DIAGNOSIS — F03.90: ICD-10-CM

## 2021-03-05 DIAGNOSIS — D68.69: ICD-10-CM

## 2021-03-05 DIAGNOSIS — I10: ICD-10-CM

## 2021-03-05 DIAGNOSIS — Z20.822: ICD-10-CM

## 2021-03-05 DIAGNOSIS — E78.5: ICD-10-CM

## 2021-03-05 DIAGNOSIS — M19.90: ICD-10-CM

## 2021-03-05 DIAGNOSIS — E11.65: ICD-10-CM

## 2021-03-05 DIAGNOSIS — N39.0: Primary | ICD-10-CM

## 2021-03-05 DIAGNOSIS — N31.9: ICD-10-CM

## 2021-03-05 DIAGNOSIS — I25.10: ICD-10-CM

## 2021-03-05 DIAGNOSIS — G92: ICD-10-CM

## 2021-03-05 DIAGNOSIS — N17.0: ICD-10-CM

## 2021-03-05 DIAGNOSIS — Z87.440: ICD-10-CM

## 2021-03-05 DIAGNOSIS — Z79.82: ICD-10-CM

## 2021-03-05 DIAGNOSIS — E83.42: ICD-10-CM

## 2021-03-05 DIAGNOSIS — Z79.84: ICD-10-CM

## 2021-03-05 DIAGNOSIS — B96.89: ICD-10-CM

## 2021-03-05 LAB
ALP SERPL-CCNC: 119 U/L (ref 50–136)
ALT SERPL W/O P-5'-P-CCNC: 32 U/L (ref 14–59)
APAP SERPL-MCNC: < 2 UG/ML (ref 10–30)
APPEARANCE UR: (no result)
AST SERPL-CCNC: 41 U/L (ref 15–37)
BASOPHILS # BLD AUTO: 0 K/UL (ref 0–8)
BASOPHILS NFR BLD AUTO: 0.3 % (ref 0–2)
BILIRUB DIRECT SERPL-MCNC: 0.2 MG/DL (ref 0–0.2)
BILIRUB SERPL-MCNC: 0.5 MG/DL (ref 0.2–1)
BILIRUB UR QL STRIP: NEGATIVE
BUN SERPL-MCNC: 33 MG/DL (ref 7–18)
CHLORIDE SERPL-SCNC: 101 MMOL/L (ref 98–107)
CO2 SERPL-SCNC: 21 MMOL/L (ref 21–32)
COLOR UR: (no result)
CREAT SERPL-MCNC: 1.3 MG/DL (ref 0.6–1.3)
DEPRECATED SQUAMOUS URNS QL MICRO: (no result) /HPF
EOSINOPHIL # BLD AUTO: 0 K/UL (ref 0–0.7)
EOSINOPHIL NFR BLD AUTO: 0.4 % (ref 0–7)
ETHANOL SERPL-MCNC: < 3 MG/DL (ref 0–0)
GLUCOSE SERPL-MCNC: 284 MG/DL (ref 74–106)
GLUCOSE UR STRIP-MCNC: NEGATIVE MG/DL
HCT VFR BLD AUTO: 35.2 % (ref 31.2–41.9)
HGB BLD-MCNC: 11.6 G/DL (ref 10.9–14.3)
HGB UR QL STRIP: (no result)
KETONES UR STRIP-MCNC: (no result) MG/DL
LEUKOCYTE ESTERASE UR QL STRIP: (no result)
LYMPHOCYTES # BLD AUTO: 0.4 K/UL (ref 20–40)
LYMPHOCYTES NFR BLD AUTO: 3.2 % (ref 20.5–51.5)
MCH RBC QN AUTO: 28.3 UUG (ref 24.7–32.8)
MCHC RBC AUTO-ENTMCNC: 33 G/DL (ref 32.3–35.6)
MCV RBC AUTO: 85.9 FL (ref 75.5–95.3)
MONOCYTES # BLD AUTO: 0.2 K/UL (ref 2–10)
MONOCYTES NFR BLD AUTO: 1.3 % (ref 0–11)
NEUTROPHILS # BLD AUTO: 10.7 K/UL (ref 1.8–8.9)
NEUTROPHILS NFR BLD AUTO: 94.8 % (ref 38.5–71.5)
NITRITE UR QL STRIP: NEGATIVE
PH UR STRIP: 5.5 [PH] (ref 5–8)
PLATELET # BLD AUTO: 332 K/UL (ref 179–408)
POTASSIUM SERPL-SCNC: 4.3 MMOL/L (ref 3.5–5.1)
RBC # BLD AUTO: 4.1 MIL/UL (ref 3.63–4.92)
RBC #/AREA URNS HPF: (no result) /HPF (ref 0–3)
SP GR UR STRIP: 1.01 (ref 1–1.03)
TSH SERPL DL<=0.005 MIU/L-ACNC: 3 MIU/ML (ref 0.36–3.74)
UROBILINOGEN UR STRIP-MCNC: 0.2 E.U./DL
WBC # BLD AUTO: 11.3 K/UL (ref 3.8–11.8)
WBC #/AREA URNS HPF: (no result) /HPF
WBC #/AREA URNS HPF: (no result) /HPF (ref 0–3)
WS STN SPEC: 8.4 G/DL (ref 6.4–8.2)

## 2021-03-05 PROCEDURE — G0378 HOSPITAL OBSERVATION PER HR: HCPCS

## 2021-03-05 PROCEDURE — A4663 DIALYSIS BLOOD PRESSURE CUFF: HCPCS

## 2021-03-05 PROCEDURE — G0480 DRUG TEST DEF 1-7 CLASSES: HCPCS

## 2021-03-05 NOTE — NUR
Dr. Pena on panel call with Luis Hood DNP. Patient accepted for 
admission to [facility], diagnosis: altered mental status.

## 2021-03-05 NOTE — NUR
Pt. admitted to tele 314, under care of TILA Mehta. Belongs List completed, 
transported with all original paperwork.

## 2021-03-06 VITALS — DIASTOLIC BLOOD PRESSURE: 53 MMHG | SYSTOLIC BLOOD PRESSURE: 110 MMHG

## 2021-03-06 VITALS — SYSTOLIC BLOOD PRESSURE: 113 MMHG | DIASTOLIC BLOOD PRESSURE: 26 MMHG

## 2021-03-06 VITALS — DIASTOLIC BLOOD PRESSURE: 98 MMHG | SYSTOLIC BLOOD PRESSURE: 127 MMHG

## 2021-03-06 VITALS — SYSTOLIC BLOOD PRESSURE: 119 MMHG | DIASTOLIC BLOOD PRESSURE: 95 MMHG

## 2021-03-06 VITALS — SYSTOLIC BLOOD PRESSURE: 118 MMHG | DIASTOLIC BLOOD PRESSURE: 54 MMHG

## 2021-03-06 LAB
ALP SERPL-CCNC: 109 U/L (ref 50–136)
ALT SERPL W/O P-5'-P-CCNC: 32 U/L (ref 14–59)
AST SERPL-CCNC: 49 U/L (ref 15–37)
BASOPHILS # BLD AUTO: 0.1 K/UL (ref 0–8)
BASOPHILS NFR BLD AUTO: 0.4 % (ref 0–2)
BILIRUB SERPL-MCNC: 0.5 MG/DL (ref 0.2–1)
BUN SERPL-MCNC: 29 MG/DL (ref 7–18)
CHLORIDE SERPL-SCNC: 106 MMOL/L (ref 98–107)
CHOLEST SERPL-MCNC: 98 MG/DL (ref ?–200)
CO2 SERPL-SCNC: 23 MMOL/L (ref 21–32)
CREAT SERPL-MCNC: 1 MG/DL (ref 0.6–1.3)
EOSINOPHIL # BLD AUTO: 0.1 K/UL (ref 0–0.7)
EOSINOPHIL NFR BLD AUTO: 0.8 % (ref 0–7)
GLUCOSE SERPL-MCNC: 138 MG/DL (ref 74–106)
HCT VFR BLD AUTO: 29.5 % (ref 31.2–41.9)
HDLC SERPL-MCNC: 57 MG/DL (ref 40–60)
HGB BLD-MCNC: 9.6 G/DL (ref 10.9–14.3)
LYMPHOCYTES # BLD AUTO: 1 K/UL (ref 20–40)
LYMPHOCYTES NFR BLD AUTO: 6.4 % (ref 20.5–51.5)
MAGNESIUM SERPL-MCNC: 1.6 MG/DL (ref 1.8–2.4)
MCH RBC QN AUTO: 28.1 UUG (ref 24.7–32.8)
MCHC RBC AUTO-ENTMCNC: 33 G/DL (ref 32.3–35.6)
MCV RBC AUTO: 86 FL (ref 75.5–95.3)
MONOCYTES # BLD AUTO: 0.7 K/UL (ref 2–10)
MONOCYTES NFR BLD AUTO: 4.6 % (ref 0–11)
NEUTROPHILS # BLD AUTO: 13.7 K/UL (ref 1.8–8.9)
NEUTROPHILS NFR BLD AUTO: 87.8 % (ref 38.5–71.5)
PHOSPHATE SERPL-MCNC: 3.4 MG/DL (ref 2.5–4.9)
PLATELET # BLD AUTO: 289 K/UL (ref 179–408)
POTASSIUM SERPL-SCNC: 3.9 MMOL/L (ref 3.5–5.1)
RBC # BLD AUTO: 3.43 MIL/UL (ref 3.63–4.92)
TRIGL SERPL-MCNC: 69 MG/DL (ref 30–150)
TSH SERPL DL<=0.005 MIU/L-ACNC: 2.77 MIU/ML (ref 0.36–3.74)
WBC # BLD AUTO: 15.6 K/UL (ref 3.8–11.8)
WS STN SPEC: 7 G/DL (ref 6.4–8.2)

## 2021-03-06 RX ADMIN — Medication SCH EACH: at 11:16

## 2021-03-06 RX ADMIN — ASPIRIN SCH MG: 81 TABLET, CHEWABLE ORAL at 09:17

## 2021-03-06 RX ADMIN — Medication SCH EACH: at 21:09

## 2021-03-06 RX ADMIN — METFORMIN HYDROCHLORIDE SCH MG: 500 TABLET ORAL at 16:36

## 2021-03-06 RX ADMIN — LOSARTAN POTASSIUM SCH MG: 50 TABLET, FILM COATED ORAL at 09:18

## 2021-03-06 RX ADMIN — METFORMIN HYDROCHLORIDE SCH MG: 500 TABLET ORAL at 09:17

## 2021-03-06 RX ADMIN — Medication SCH EACH: at 17:21

## 2021-03-06 RX ADMIN — MAGNESIUM SULFATE IN DEXTROSE SCH MLS/HR: 10 INJECTION, SOLUTION INTRAVENOUS at 09:17

## 2021-03-06 RX ADMIN — MAGNESIUM SULFATE IN DEXTROSE SCH MLS/HR: 10 INJECTION, SOLUTION INTRAVENOUS at 10:50

## 2021-03-06 RX ADMIN — METFORMIN HYDROCHLORIDE SCH MG: 500 TABLET ORAL at 12:40

## 2021-03-06 RX ADMIN — AMLODIPINE BESYLATE SCH MG: 10 TABLET ORAL at 09:18

## 2021-03-06 RX ADMIN — ENOXAPARIN SODIUM SCH MG: 40 INJECTION SUBCUTANEOUS at 09:20

## 2021-03-06 RX ADMIN — ATORVASTATIN CALCIUM SCH MG: 20 TABLET, FILM COATED ORAL at 21:02

## 2021-03-06 RX ADMIN — SODIUM CHLORIDE PRN MLS/HR: 0.45 INJECTION, SOLUTION INTRAVENOUS at 01:18

## 2021-03-06 RX ADMIN — Medication SCH EACH: at 06:34

## 2021-03-06 RX ADMIN — METOPROLOL SUCCINATE SCH MG: 50 TABLET, FILM COATED, EXTENDED RELEASE ORAL at 09:19

## 2021-03-06 RX ADMIN — SODIUM CHLORIDE PRN UNIT: 9 INJECTION, SOLUTION INTRAVENOUS at 17:23

## 2021-03-06 NOTE — NUR
Awake, confused, trying to bite. With bilateral soft wrist restraints, monitored per 
protocol. IVF infusing.

## 2021-03-06 NOTE — NUR
Assisted with meals, able to eat fairly with pureed. Sponge bath given. Wound care done, 
Mepilex applied to sacral area. Placed on 1st step mattress. Repositioned in bed comfortably

## 2021-03-06 NOTE — NUR
Received in bed, awake, responsive and obeys simple commands, no s/s of respiratory 
distress, IVF infusing well on left forearm IV access, johnson cath draining well, with 
bilateral soft wrist restraints. Safety measures maintained, will continue to monitor.

## 2021-03-06 NOTE — NUR
Received pt from ED d/t AMS. Pt is unable to follow commands. No s/s of acute distress noted 
at this time. Pt is on tele, NSR, 89. V/S stable on room air. Quigley is intact and draining 
well. PIV on LFA 22G is intact with 1/2 NS running at 50ml/hr. Bilateral soft restraints in 
place, to prevent pt from removing lines. Personal belongings checked and with the patient. 
Safety measures in place. Bed low and locked in position, will continue with the plan of 
care

## 2021-03-06 NOTE — NUR
Pt stable. Pt still non-verbal and cannot follow simple commands. On RA, no s/s of acute 
distress. NSR,89 on tele monitor. Isolation, fall and aspiration precaution maintained. Will 
endorse to oncoming nurse.

## 2021-03-07 VITALS — DIASTOLIC BLOOD PRESSURE: 49 MMHG | SYSTOLIC BLOOD PRESSURE: 146 MMHG

## 2021-03-07 VITALS — DIASTOLIC BLOOD PRESSURE: 54 MMHG | SYSTOLIC BLOOD PRESSURE: 121 MMHG

## 2021-03-07 VITALS — DIASTOLIC BLOOD PRESSURE: 56 MMHG | SYSTOLIC BLOOD PRESSURE: 110 MMHG

## 2021-03-07 VITALS — SYSTOLIC BLOOD PRESSURE: 113 MMHG | DIASTOLIC BLOOD PRESSURE: 44 MMHG

## 2021-03-07 VITALS — DIASTOLIC BLOOD PRESSURE: 58 MMHG | SYSTOLIC BLOOD PRESSURE: 127 MMHG

## 2021-03-07 LAB
BASOPHILS # BLD AUTO: 0 K/UL (ref 0–8)
BASOPHILS NFR BLD AUTO: 0.4 % (ref 0–2)
BUN SERPL-MCNC: 33 MG/DL (ref 7–18)
CHLORIDE SERPL-SCNC: 104 MMOL/L (ref 98–107)
CO2 SERPL-SCNC: 21 MMOL/L (ref 21–32)
CREAT SERPL-MCNC: 0.9 MG/DL (ref 0.6–1.3)
EOSINOPHIL # BLD AUTO: 0.2 K/UL (ref 0–0.7)
EOSINOPHIL NFR BLD AUTO: 1.9 % (ref 0–7)
GLUCOSE SERPL-MCNC: 163 MG/DL (ref 74–106)
HCT VFR BLD AUTO: 31.5 % (ref 31.2–41.9)
HGB BLD-MCNC: 10.3 G/DL (ref 10.9–14.3)
LYMPHOCYTES # BLD AUTO: 0.9 K/UL (ref 20–40)
LYMPHOCYTES NFR BLD AUTO: 10.1 % (ref 20.5–51.5)
MAGNESIUM SERPL-MCNC: 2.2 MG/DL (ref 1.8–2.4)
MCH RBC QN AUTO: 27.9 UUG (ref 24.7–32.8)
MCHC RBC AUTO-ENTMCNC: 33 G/DL (ref 32.3–35.6)
MCV RBC AUTO: 85.4 FL (ref 75.5–95.3)
MONOCYTES # BLD AUTO: 0.4 K/UL (ref 2–10)
MONOCYTES NFR BLD AUTO: 3.9 % (ref 0–11)
NEUTROPHILS # BLD AUTO: 7.8 K/UL (ref 1.8–8.9)
NEUTROPHILS NFR BLD AUTO: 83.7 % (ref 38.5–71.5)
PLATELET # BLD AUTO: 273 K/UL (ref 179–408)
POTASSIUM SERPL-SCNC: 3.6 MMOL/L (ref 3.5–5.1)
RBC # BLD AUTO: 3.69 MIL/UL (ref 3.63–4.92)
WBC # BLD AUTO: 9.3 K/UL (ref 3.8–11.8)

## 2021-03-07 RX ADMIN — SODIUM CHLORIDE PRN MLS/HR: 0.45 INJECTION, SOLUTION INTRAVENOUS at 01:22

## 2021-03-07 RX ADMIN — SODIUM CHLORIDE PRN UNIT: 9 INJECTION, SOLUTION INTRAVENOUS at 20:59

## 2021-03-07 RX ADMIN — Medication SCH EACH: at 16:37

## 2021-03-07 RX ADMIN — METFORMIN HYDROCHLORIDE SCH MG: 500 TABLET ORAL at 08:30

## 2021-03-07 RX ADMIN — Medication SCH EACH: at 11:43

## 2021-03-07 RX ADMIN — AMLODIPINE BESYLATE SCH MG: 10 TABLET ORAL at 09:52

## 2021-03-07 RX ADMIN — Medication SCH EACH: at 20:44

## 2021-03-07 RX ADMIN — ATORVASTATIN CALCIUM SCH MG: 20 TABLET, FILM COATED ORAL at 20:32

## 2021-03-07 RX ADMIN — SODIUM CHLORIDE PRN UNIT: 9 INJECTION, SOLUTION INTRAVENOUS at 16:44

## 2021-03-07 RX ADMIN — LOSARTAN POTASSIUM SCH MG: 50 TABLET, FILM COATED ORAL at 09:52

## 2021-03-07 RX ADMIN — METFORMIN HYDROCHLORIDE SCH MG: 500 TABLET ORAL at 16:48

## 2021-03-07 RX ADMIN — SODIUM CHLORIDE SCH MLS/HR: 9 INJECTION, SOLUTION INTRAVENOUS at 20:31

## 2021-03-07 RX ADMIN — ENOXAPARIN SODIUM SCH MG: 40 INJECTION SUBCUTANEOUS at 09:54

## 2021-03-07 RX ADMIN — METOPROLOL SUCCINATE SCH MG: 50 TABLET, FILM COATED, EXTENDED RELEASE ORAL at 09:52

## 2021-03-07 RX ADMIN — METFORMIN HYDROCHLORIDE SCH MG: 500 TABLET ORAL at 12:13

## 2021-03-07 RX ADMIN — ASPIRIN SCH MG: 81 TABLET, CHEWABLE ORAL at 09:54

## 2021-03-07 RX ADMIN — SODIUM CHLORIDE PRN UNIT: 9 INJECTION, SOLUTION INTRAVENOUS at 11:44

## 2021-03-07 RX ADMIN — SODIUM CHLORIDE SCH MLS/HR: 9 INJECTION, SOLUTION INTRAVENOUS at 09:55

## 2021-03-07 RX ADMIN — Medication SCH EACH: at 06:40

## 2021-03-07 RX ADMIN — SODIUM CHLORIDE PRN UNIT: 9 INJECTION, SOLUTION INTRAVENOUS at 08:28

## 2021-03-07 NOTE — NUR
Pt in bed, sleeping, easily arousable to name and touch, no s/s of respiratory distress, no 
pain or discomfort noted, safety measures maintained, with bilateral soft wrist restraints, 
all needs attended.

## 2021-03-08 VITALS — DIASTOLIC BLOOD PRESSURE: 63 MMHG | SYSTOLIC BLOOD PRESSURE: 142 MMHG

## 2021-03-08 VITALS — SYSTOLIC BLOOD PRESSURE: 112 MMHG | DIASTOLIC BLOOD PRESSURE: 52 MMHG

## 2021-03-08 VITALS — DIASTOLIC BLOOD PRESSURE: 85 MMHG | SYSTOLIC BLOOD PRESSURE: 130 MMHG

## 2021-03-08 VITALS — DIASTOLIC BLOOD PRESSURE: 56 MMHG | SYSTOLIC BLOOD PRESSURE: 142 MMHG

## 2021-03-08 LAB
BASOPHILS # BLD AUTO: 0 K/UL (ref 0–8)
BASOPHILS NFR BLD AUTO: 0.6 % (ref 0–2)
BUN SERPL-MCNC: 30 MG/DL (ref 7–18)
CHLORIDE SERPL-SCNC: 104 MMOL/L (ref 98–107)
CO2 SERPL-SCNC: 27 MMOL/L (ref 21–32)
CREAT SERPL-MCNC: 0.8 MG/DL (ref 0.6–1.3)
EOSINOPHIL # BLD AUTO: 0.2 K/UL (ref 0–0.7)
EOSINOPHIL NFR BLD AUTO: 2.8 % (ref 0–7)
GLUCOSE SERPL-MCNC: 232 MG/DL (ref 74–106)
HCT VFR BLD AUTO: 33.1 % (ref 31.2–41.9)
HGB BLD-MCNC: 10.8 G/DL (ref 10.9–14.3)
LYMPHOCYTES # BLD AUTO: 0.7 K/UL (ref 20–40)
LYMPHOCYTES NFR BLD AUTO: 12.2 % (ref 20.5–51.5)
MAGNESIUM SERPL-MCNC: 1.9 MG/DL (ref 1.8–2.4)
MCH RBC QN AUTO: 27.9 UUG (ref 24.7–32.8)
MCHC RBC AUTO-ENTMCNC: 33 G/DL (ref 32.3–35.6)
MCV RBC AUTO: 85.4 FL (ref 75.5–95.3)
MONOCYTES # BLD AUTO: 0.4 K/UL (ref 2–10)
MONOCYTES NFR BLD AUTO: 6 % (ref 0–11)
NEUTROPHILS # BLD AUTO: 4.7 K/UL (ref 1.8–8.9)
NEUTROPHILS NFR BLD AUTO: 78.4 % (ref 38.5–71.5)
PHOSPHATE SERPL-MCNC: 2.5 MG/DL (ref 2.5–4.9)
PLATELET # BLD AUTO: 308 K/UL (ref 179–408)
POTASSIUM SERPL-SCNC: 3.7 MMOL/L (ref 3.5–5.1)
RBC # BLD AUTO: 3.87 MIL/UL (ref 3.63–4.92)
WBC # BLD AUTO: 6 K/UL (ref 3.8–11.8)

## 2021-03-08 RX ADMIN — METFORMIN HYDROCHLORIDE SCH MG: 500 TABLET ORAL at 12:18

## 2021-03-08 RX ADMIN — ENOXAPARIN SODIUM SCH MG: 40 INJECTION SUBCUTANEOUS at 08:30

## 2021-03-08 RX ADMIN — SODIUM CHLORIDE PRN UNIT: 9 INJECTION, SOLUTION INTRAVENOUS at 12:19

## 2021-03-08 RX ADMIN — SODIUM CHLORIDE SCH MLS/HR: 9 INJECTION, SOLUTION INTRAVENOUS at 09:17

## 2021-03-08 RX ADMIN — LOSARTAN POTASSIUM SCH MG: 50 TABLET, FILM COATED ORAL at 08:26

## 2021-03-08 RX ADMIN — Medication SCH EACH: at 12:12

## 2021-03-08 RX ADMIN — SODIUM CHLORIDE PRN UNIT: 9 INJECTION, SOLUTION INTRAVENOUS at 16:50

## 2021-03-08 RX ADMIN — METOPROLOL SUCCINATE SCH MG: 50 TABLET, FILM COATED, EXTENDED RELEASE ORAL at 08:26

## 2021-03-08 RX ADMIN — SODIUM CHLORIDE PRN MLS/HR: 0.45 INJECTION, SOLUTION INTRAVENOUS at 04:31

## 2021-03-08 RX ADMIN — METFORMIN HYDROCHLORIDE SCH MG: 500 TABLET ORAL at 08:28

## 2021-03-08 RX ADMIN — SODIUM CHLORIDE PRN UNIT: 9 INJECTION, SOLUTION INTRAVENOUS at 08:33

## 2021-03-08 RX ADMIN — AMLODIPINE BESYLATE SCH MG: 10 TABLET ORAL at 08:28

## 2021-03-08 RX ADMIN — Medication SCH EACH: at 16:47

## 2021-03-08 RX ADMIN — METFORMIN HYDROCHLORIDE SCH MG: 500 TABLET ORAL at 16:48

## 2021-03-08 RX ADMIN — ASPIRIN SCH MG: 81 TABLET, CHEWABLE ORAL at 08:26

## 2021-03-08 NOTE — NUR
Patient slept intermittently.No s/s of distress noted.On RA.Iv on left FA 20g patent and 
intact.Administered ATB IV for UTI no r/a noted.Quigley catheter draining well with clear 
urine output.IVF infusing well.Continue safety measures.

## 2021-03-08 NOTE — NUR
PATIENT DISCHARGED TO HOME VIA AMBULANCE WITH AMBULANCE STAFF. ALL BELONGINGS LEFT WITH 
PATIENT. DISCHAGE INSTRUCTIONS AND RX GIVEN TO AMBULANCE STAFF AND REPORTED ON PATENT 
CONDITION. AMBULANCE STAFF IS TO GIVE DISCHARGE INSTRUCTIONS TO FAMILY UPON ARRIVAL BECAUSE 
PATIENT IS CONFUSED. DC IV AND ARM BAND. PATIENT LEFT IN FAIR CONDITION WITH VS STABLE. 

-------------------------------------------------------------------------------

Addendum: 03/08/21 at 1907 by BARI CHUN RN

-------------------------------------------------------------------------------

PATIENT REFUSED ANY PICTURES TO BE TAKEN UPON DISCHARGE.

## 2021-03-08 NOTE — NUR
PATIENT CONFUSED, AWAKE IN BED. ABLE TO TAKE MEDICATIONS AS ORDERED. NO SIGNS OF DISTRESS AT 
THIS TIME. CALL LIGHT IN REACH. BED IN LOW AND LOCKED POSITION. ALL NEEDS MET AT THIS TIME. 
WILL CONTINUE TO MONITOR.

## 2021-03-12 ENCOUNTER — HOSPITAL ENCOUNTER (INPATIENT)
Dept: HOSPITAL 12 - ER | Age: 86
LOS: 3 days | Discharge: HOME HEALTH | DRG: 640 | End: 2021-03-15
Payer: MEDICARE

## 2021-03-12 VITALS — SYSTOLIC BLOOD PRESSURE: 133 MMHG | DIASTOLIC BLOOD PRESSURE: 48 MMHG

## 2021-03-12 VITALS — WEIGHT: 115 LBS | HEIGHT: 61 IN | BODY MASS INDEX: 21.71 KG/M2

## 2021-03-12 DIAGNOSIS — G92: ICD-10-CM

## 2021-03-12 DIAGNOSIS — Z79.82: ICD-10-CM

## 2021-03-12 DIAGNOSIS — E78.5: ICD-10-CM

## 2021-03-12 DIAGNOSIS — E86.0: Primary | ICD-10-CM

## 2021-03-12 DIAGNOSIS — J98.11: ICD-10-CM

## 2021-03-12 DIAGNOSIS — N17.0: ICD-10-CM

## 2021-03-12 DIAGNOSIS — E88.09: ICD-10-CM

## 2021-03-12 DIAGNOSIS — N31.9: ICD-10-CM

## 2021-03-12 DIAGNOSIS — L89.156: ICD-10-CM

## 2021-03-12 DIAGNOSIS — F03.90: ICD-10-CM

## 2021-03-12 DIAGNOSIS — E44.1: ICD-10-CM

## 2021-03-12 DIAGNOSIS — I10: ICD-10-CM

## 2021-03-12 DIAGNOSIS — E11.9: ICD-10-CM

## 2021-03-12 DIAGNOSIS — M19.90: ICD-10-CM

## 2021-03-12 DIAGNOSIS — Z79.84: ICD-10-CM

## 2021-03-12 DIAGNOSIS — D68.69: ICD-10-CM

## 2021-03-12 DIAGNOSIS — I25.10: ICD-10-CM

## 2021-03-12 DIAGNOSIS — R93.1: ICD-10-CM

## 2021-03-12 DIAGNOSIS — Z87.440: ICD-10-CM

## 2021-03-12 DIAGNOSIS — G91.2: ICD-10-CM

## 2021-03-12 DIAGNOSIS — Z20.822: ICD-10-CM

## 2021-03-12 LAB
ALP SERPL-CCNC: 98 U/L (ref 50–136)
ALT SERPL W/O P-5'-P-CCNC: 21 U/L (ref 14–59)
AMORPH URATE CRY URNS QL MICRO: (no result) /HPF
APPEARANCE UR: CLEAR
AST SERPL-CCNC: 15 U/L (ref 15–37)
BASE EXCESS BLDA CALC-SCNC: -1.4 MMOL/L
BASOPHILS # BLD AUTO: 0.1 K/UL (ref 0–8)
BASOPHILS NFR BLD AUTO: 1.2 % (ref 0–2)
BILIRUB DIRECT SERPL-MCNC: 0.1 MG/DL (ref 0–0.2)
BILIRUB SERPL-MCNC: 0.3 MG/DL (ref 0.2–1)
BILIRUB UR QL STRIP: NEGATIVE
BUN SERPL-MCNC: 38 MG/DL (ref 7–18)
CHLORIDE SERPL-SCNC: 105 MMOL/L (ref 98–107)
CK SERPL-CCNC: 82 U/L (ref 26–192)
CO2 SERPL-SCNC: 25 MMOL/L (ref 21–32)
COLOR UR: YELLOW
CREAT SERPL-MCNC: 1.1 MG/DL (ref 0.6–1.3)
DEPRECATED SQUAMOUS URNS QL MICRO: (no result) /HPF
EOSINOPHIL # BLD AUTO: 0.2 K/UL (ref 0–0.7)
EOSINOPHIL NFR BLD AUTO: 2.6 % (ref 0–7)
GLUCOSE SERPL-MCNC: 148 MG/DL (ref 74–106)
GLUCOSE UR STRIP-MCNC: NEGATIVE MG/DL
HCO3 BLDA-SCNC: 21.8 MMOL/L
HCT VFR BLD AUTO: 32.2 % (ref 31.2–41.9)
HGB BLD-MCNC: 10.3 G/DL (ref 10.9–14.3)
HGB BLDA OXIMETRY-MCNC: 10.5 G/DL (ref 12–16)
HGB UR QL STRIP: (no result)
INHALED O2 CONCENTRATION: 28 %
KETONES UR STRIP-MCNC: NEGATIVE MG/DL
LEUKOCYTE ESTERASE UR QL STRIP: NEGATIVE
LIPASE SERPL-CCNC: 41 U/L (ref 73–393)
LYMPHOCYTES # BLD AUTO: 2 K/UL (ref 20–40)
LYMPHOCYTES NFR BLD AUTO: 27.1 % (ref 20.5–51.5)
MAGNESIUM SERPL-MCNC: 1.9 MG/DL (ref 1.8–2.4)
MCH RBC QN AUTO: 27.3 UUG (ref 24.7–32.8)
MCHC RBC AUTO-ENTMCNC: 32 G/DL (ref 32.3–35.6)
MCV RBC AUTO: 85.6 FL (ref 75.5–95.3)
MONOCYTES # BLD AUTO: 0.5 K/UL (ref 2–10)
MONOCYTES NFR BLD AUTO: 6.5 % (ref 0–11)
NEUTROPHILS # BLD AUTO: 4.7 K/UL (ref 1.8–8.9)
NEUTROPHILS NFR BLD AUTO: 62.6 % (ref 38.5–71.5)
NITRITE UR QL STRIP: NEGATIVE
PCO2 TEMP ADJ BLDA: 31.7 MMHG (ref 35–45)
PH TEMP ADJ BLDA: 7.46 [PH] (ref 7.35–7.45)
PH UR STRIP: 6 [PH] (ref 5–8)
PHOSPHATE SERPL-MCNC: 3.7 MG/DL (ref 2.5–4.9)
PLATELET # BLD AUTO: 360 K/UL (ref 179–408)
PO2 TEMP ADJ BLDA: 109.8 MMHG (ref 75–100)
POTASSIUM SERPL-SCNC: 4.5 MMOL/L (ref 3.5–5.1)
RBC # BLD AUTO: 3.76 MIL/UL (ref 3.63–4.92)
SP GR UR STRIP: 1.02 (ref 1–1.03)
SPECIMEN DRAWN FROM PATIENT: (no result)
TSH SERPL DL<=0.005 MIU/L-ACNC: 1.83 MIU/ML (ref 0.36–3.74)
UROBILINOGEN UR STRIP-MCNC: 0.2 E.U./DL
WBC # BLD AUTO: 7.4 K/UL (ref 3.8–11.8)
WBC #/AREA URNS HPF: (no result) /HPF
WBC #/AREA URNS HPF: (no result) /HPF (ref 0–3)
WS STN SPEC: 7.6 G/DL (ref 6.4–8.2)

## 2021-03-12 PROCEDURE — G0378 HOSPITAL OBSERVATION PER HR: HCPCS

## 2021-03-12 PROCEDURE — A4663 DIALYSIS BLOOD PRESSURE CUFF: HCPCS

## 2021-03-12 RX ADMIN — ENOXAPARIN SODIUM SCH MG: 30 INJECTION SUBCUTANEOUS at 20:37

## 2021-03-12 RX ADMIN — Medication SCH EACH: at 20:35

## 2021-03-12 RX ADMIN — SODIUM CHLORIDE PRN MLS/HR: 0.45 INJECTION, SOLUTION INTRAVENOUS at 19:06

## 2021-03-12 RX ADMIN — SODIUM CHLORIDE PRN UNIT: 9 INJECTION, SOLUTION INTRAVENOUS at 20:38

## 2021-03-12 RX ADMIN — MUPIROCIN SCH GM: 20 OINTMENT TOPICAL at 20:37

## 2021-03-12 NOTE — NUR
Pt. admitted to [facility]  , under care of TILA Esposito

Belongs List completed and sent with patient

## 2021-03-12 NOTE — NUR
Patient brought in by ambulance for increased altered mental status per family 
request, MD at bedside for assessment

## 2021-03-12 NOTE — NUR
DUE TO CONTRAST DYE THAT WAS USED DURING CT SCAN, PATIENT IS NOT ALLOWED TO 
TAKE METFORMIN FOR THE NEXT 48 HOURS

## 2021-03-12 NOTE — NUR
Received this patient, admission from ER, 86 yo female, with diagnosis of altered mental 
status. Awake, confused, room air. Routine admission care rendered. Incontinence care done. 
Noted partial thickness loss on sacral area. Mepilex applied. Wound care consult ordered.

## 2021-03-13 VITALS — SYSTOLIC BLOOD PRESSURE: 119 MMHG | DIASTOLIC BLOOD PRESSURE: 53 MMHG

## 2021-03-13 VITALS — DIASTOLIC BLOOD PRESSURE: 54 MMHG | SYSTOLIC BLOOD PRESSURE: 136 MMHG

## 2021-03-13 VITALS — SYSTOLIC BLOOD PRESSURE: 127 MMHG | DIASTOLIC BLOOD PRESSURE: 41 MMHG

## 2021-03-13 VITALS — SYSTOLIC BLOOD PRESSURE: 114 MMHG | DIASTOLIC BLOOD PRESSURE: 65 MMHG

## 2021-03-13 LAB
BASOPHILS # BLD AUTO: 0.1 K/UL (ref 0–8)
BASOPHILS NFR BLD AUTO: 1 % (ref 0–2)
BUN SERPL-MCNC: 35 MG/DL (ref 7–18)
CHLORIDE SERPL-SCNC: 105 MMOL/L (ref 98–107)
CHOLEST SERPL-MCNC: 118 MG/DL (ref ?–200)
CO2 SERPL-SCNC: 26 MMOL/L (ref 21–32)
CREAT SERPL-MCNC: 0.9 MG/DL (ref 0.6–1.3)
EOSINOPHIL # BLD AUTO: 0.2 K/UL (ref 0–0.7)
EOSINOPHIL NFR BLD AUTO: 2.8 % (ref 0–7)
GLUCOSE SERPL-MCNC: 98 MG/DL (ref 74–106)
HCT VFR BLD AUTO: 31.2 % (ref 31.2–41.9)
HDLC SERPL-MCNC: 46 MG/DL (ref 40–60)
HGB BLD-MCNC: 10.2 G/DL (ref 10.9–14.3)
IRON SERPL-MCNC: 21 UG/DL (ref 50–175)
LYMPHOCYTES # BLD AUTO: 2.4 K/UL (ref 20–40)
LYMPHOCYTES NFR BLD AUTO: 36.5 % (ref 20.5–51.5)
MAGNESIUM SERPL-MCNC: 1.8 MG/DL (ref 1.8–2.4)
MCH RBC QN AUTO: 28 UUG (ref 24.7–32.8)
MCHC RBC AUTO-ENTMCNC: 33 G/DL (ref 32.3–35.6)
MCV RBC AUTO: 85.6 FL (ref 75.5–95.3)
MONOCYTES # BLD AUTO: 0.5 K/UL (ref 2–10)
MONOCYTES NFR BLD AUTO: 8.1 % (ref 0–11)
NEUTROPHILS # BLD AUTO: 3.4 K/UL (ref 1.8–8.9)
NEUTROPHILS NFR BLD AUTO: 51.6 % (ref 38.5–71.5)
PHOSPHATE SERPL-MCNC: 4.2 MG/DL (ref 2.5–4.9)
PLATELET # BLD AUTO: 353 K/UL (ref 179–408)
POTASSIUM SERPL-SCNC: 4.6 MMOL/L (ref 3.5–5.1)
RBC # BLD AUTO: 3.64 MIL/UL (ref 3.63–4.92)
TRIGL SERPL-MCNC: 72 MG/DL (ref 30–150)
WBC # BLD AUTO: 6.6 K/UL (ref 3.8–11.8)

## 2021-03-13 RX ADMIN — ENOXAPARIN SODIUM SCH MG: 30 INJECTION SUBCUTANEOUS at 20:18

## 2021-03-13 RX ADMIN — AMLODIPINE BESYLATE SCH MG: 10 TABLET ORAL at 10:16

## 2021-03-13 RX ADMIN — Medication SCH EACH: at 06:31

## 2021-03-13 RX ADMIN — Medication SCH EACH: at 17:32

## 2021-03-13 RX ADMIN — SODIUM CHLORIDE PRN UNIT: 9 INJECTION, SOLUTION INTRAVENOUS at 20:16

## 2021-03-13 RX ADMIN — ATORVASTATIN CALCIUM SCH MG: 10 TABLET, FILM COATED ORAL at 10:14

## 2021-03-13 RX ADMIN — METOPROLOL SUCCINATE SCH MG: 50 TABLET, FILM COATED, EXTENDED RELEASE ORAL at 10:14

## 2021-03-13 RX ADMIN — LOSARTAN POTASSIUM SCH MG: 50 TABLET, FILM COATED ORAL at 10:20

## 2021-03-13 RX ADMIN — SODIUM CHLORIDE PRN UNIT: 9 INJECTION, SOLUTION INTRAVENOUS at 17:40

## 2021-03-13 RX ADMIN — Medication SCH EACH: at 11:50

## 2021-03-13 RX ADMIN — SODIUM CHLORIDE PRN MLS/HR: 0.45 INJECTION, SOLUTION INTRAVENOUS at 19:51

## 2021-03-13 RX ADMIN — Medication SCH EACH: at 20:15

## 2021-03-13 RX ADMIN — MUPIROCIN SCH GM: 20 OINTMENT TOPICAL at 20:15

## 2021-03-13 RX ADMIN — ASPIRIN SCH MG: 81 TABLET, CHEWABLE ORAL at 10:12

## 2021-03-13 RX ADMIN — SODIUM CHLORIDE PRN UNIT: 9 INJECTION, SOLUTION INTRAVENOUS at 11:51

## 2021-03-13 RX ADMIN — MUPIROCIN SCH GM: 20 OINTMENT TOPICAL at 10:20

## 2021-03-13 RX ADMIN — PANTOPRAZOLE SODIUM SCH MG: 40 TABLET, DELAYED RELEASE ORAL at 06:07

## 2021-03-13 NOTE — NUR
received report on pt, awake in bed, alert to self. pt has partial thickness on sacrum 
covered with foam dressing. pt on room air saturating at 100% no signs of distress noted, no 
reports of pain, pt on bedrest, first step mattress ordered, pt incontinent has on diaper. 
IV access on the right FA 22g 1/2NS infusing at 75cc. Bed in low and locked position, call 
light within reach, fall and safety precautions in place, will continue with plan of care.

## 2021-03-13 NOTE — NUR
pt on first step mattress, pt able to walk to with two person assist and walker to the wall. 
please see OT/PT notes.

## 2021-03-13 NOTE — NUR
pt in bed resting, alert to self, partial thickness on sacrum area, covered with hydrogel 
and foam dressing, clean dry and intact. pt on room air, no signs of distress noted, no 
reports of pain, all needs met during this shift. IV acces on the right FA 22g IVF infusing 
1/2NA at 75cc. pt did well with Physical Therapy, pt able to walk to wall with walker and 
two person assist.  Pt voids via diaper, all medication given as ordered. Meds to be crushed 
and given with applesauce. Bed in low and locked position, call light within reach, fall and 
safety precautions in place, will endorse to oncoming nurse.

## 2021-03-14 VITALS — SYSTOLIC BLOOD PRESSURE: 89 MMHG | DIASTOLIC BLOOD PRESSURE: 41 MMHG

## 2021-03-14 VITALS — SYSTOLIC BLOOD PRESSURE: 129 MMHG | DIASTOLIC BLOOD PRESSURE: 70 MMHG

## 2021-03-14 VITALS — DIASTOLIC BLOOD PRESSURE: 60 MMHG | SYSTOLIC BLOOD PRESSURE: 120 MMHG

## 2021-03-14 VITALS — DIASTOLIC BLOOD PRESSURE: 49 MMHG | SYSTOLIC BLOOD PRESSURE: 139 MMHG

## 2021-03-14 VITALS — SYSTOLIC BLOOD PRESSURE: 121 MMHG | DIASTOLIC BLOOD PRESSURE: 48 MMHG

## 2021-03-14 LAB
BASOPHILS # BLD AUTO: 0.1 K/UL (ref 0–8)
BASOPHILS NFR BLD AUTO: 1 % (ref 0–2)
BUN SERPL-MCNC: 32 MG/DL (ref 7–18)
CHLORIDE SERPL-SCNC: 103 MMOL/L (ref 98–107)
CO2 SERPL-SCNC: 25 MMOL/L (ref 21–32)
CREAT SERPL-MCNC: 0.8 MG/DL (ref 0.6–1.3)
EOSINOPHIL # BLD AUTO: 0.2 K/UL (ref 0–0.7)
EOSINOPHIL NFR BLD AUTO: 3.1 % (ref 0–7)
GLUCOSE SERPL-MCNC: 116 MG/DL (ref 74–106)
HCT VFR BLD AUTO: 34.7 % (ref 31.2–41.9)
HGB BLD-MCNC: 11.1 G/DL (ref 10.9–14.3)
LYMPHOCYTES # BLD AUTO: 2.7 K/UL (ref 20–40)
LYMPHOCYTES NFR BLD AUTO: 37.2 % (ref 20.5–51.5)
MAGNESIUM SERPL-MCNC: 1.8 MG/DL (ref 1.8–2.4)
MCH RBC QN AUTO: 27.7 UUG (ref 24.7–32.8)
MCHC RBC AUTO-ENTMCNC: 32 G/DL (ref 32.3–35.6)
MCV RBC AUTO: 86.5 FL (ref 75.5–95.3)
MONOCYTES # BLD AUTO: 0.5 K/UL (ref 2–10)
MONOCYTES NFR BLD AUTO: 6.8 % (ref 0–11)
NEUTROPHILS # BLD AUTO: 3.8 K/UL (ref 1.8–8.9)
NEUTROPHILS NFR BLD AUTO: 51.9 % (ref 38.5–71.5)
PHOSPHATE SERPL-MCNC: 4.4 MG/DL (ref 2.5–4.9)
PLATELET # BLD AUTO: 453 K/UL (ref 179–408)
POTASSIUM SERPL-SCNC: 4.3 MMOL/L (ref 3.5–5.1)
RBC # BLD AUTO: 4.01 MIL/UL (ref 3.63–4.92)
WBC # BLD AUTO: 7.4 K/UL (ref 3.8–11.8)

## 2021-03-14 RX ADMIN — Medication SCH EACH: at 20:32

## 2021-03-14 RX ADMIN — ATORVASTATIN CALCIUM SCH MG: 10 TABLET, FILM COATED ORAL at 08:42

## 2021-03-14 RX ADMIN — LORAZEPAM PRN MG: 2 INJECTION INTRAMUSCULAR; INTRAVENOUS at 00:56

## 2021-03-14 RX ADMIN — Medication SCH EACH: at 06:18

## 2021-03-14 RX ADMIN — SODIUM CHLORIDE PRN UNIT: 9 INJECTION, SOLUTION INTRAVENOUS at 20:50

## 2021-03-14 RX ADMIN — MUPIROCIN SCH GM: 20 OINTMENT TOPICAL at 08:40

## 2021-03-14 RX ADMIN — LOSARTAN POTASSIUM SCH MG: 50 TABLET, FILM COATED ORAL at 08:41

## 2021-03-14 RX ADMIN — MUPIROCIN SCH GM: 20 OINTMENT TOPICAL at 20:53

## 2021-03-14 RX ADMIN — ENOXAPARIN SODIUM SCH MG: 30 INJECTION SUBCUTANEOUS at 20:33

## 2021-03-14 RX ADMIN — Medication SCH EACH: at 12:00

## 2021-03-14 RX ADMIN — AMLODIPINE BESYLATE SCH MG: 10 TABLET ORAL at 08:41

## 2021-03-14 RX ADMIN — PANTOPRAZOLE SODIUM SCH MG: 40 TABLET, DELAYED RELEASE ORAL at 06:10

## 2021-03-14 RX ADMIN — SODIUM CHLORIDE PRN UNIT: 9 INJECTION, SOLUTION INTRAVENOUS at 12:03

## 2021-03-14 RX ADMIN — SODIUM CHLORIDE PRN UNIT: 9 INJECTION, SOLUTION INTRAVENOUS at 16:48

## 2021-03-14 RX ADMIN — METOPROLOL SUCCINATE SCH MG: 50 TABLET, FILM COATED, EXTENDED RELEASE ORAL at 08:42

## 2021-03-14 RX ADMIN — Medication SCH EACH: at 16:43

## 2021-03-14 RX ADMIN — ASPIRIN SCH MG: 81 TABLET, CHEWABLE ORAL at 08:40

## 2021-03-14 NOTE — NUR
Alert and oriented x 1. No acute distress. Due meds given and tolerated. No s/sx of 
hypo/hyperglycemia noted. Kept comfortable. Needs attended. Will continue to monitor and 
endorse accordingly.

## 2021-03-14 NOTE — NUR
PATIENTS IV NOTED TO RIGHT AC, LEAKING AND DISLODGED. UNABLE TO RESTART IV. RN CHARGE 
NOTIFIED. WILL CONTINUE TO MONITOR AND ASSESS.

## 2021-03-14 NOTE — NUR
Asleep. HOB elevate. Breathing even and non labored. No facial grimacing noted. Kept 
comfortable. Call light within reach. Will continue to monitor.

## 2021-03-15 VITALS — SYSTOLIC BLOOD PRESSURE: 128 MMHG | DIASTOLIC BLOOD PRESSURE: 44 MMHG

## 2021-03-15 VITALS — SYSTOLIC BLOOD PRESSURE: 118 MMHG | DIASTOLIC BLOOD PRESSURE: 54 MMHG

## 2021-03-15 VITALS — SYSTOLIC BLOOD PRESSURE: 128 MMHG | DIASTOLIC BLOOD PRESSURE: 40 MMHG

## 2021-03-15 VITALS — DIASTOLIC BLOOD PRESSURE: 43 MMHG | SYSTOLIC BLOOD PRESSURE: 130 MMHG

## 2021-03-15 LAB
BASOPHILS # BLD AUTO: 0.1 K/UL (ref 0–8)
BASOPHILS NFR BLD AUTO: 1 % (ref 0–2)
BUN SERPL-MCNC: 28 MG/DL (ref 7–18)
CHLORIDE SERPL-SCNC: 101 MMOL/L (ref 98–107)
CO2 SERPL-SCNC: 23 MMOL/L (ref 21–32)
CREAT SERPL-MCNC: 0.8 MG/DL (ref 0.6–1.3)
EOSINOPHIL # BLD AUTO: 0.2 K/UL (ref 0–0.7)
EOSINOPHIL NFR BLD AUTO: 3.1 % (ref 0–7)
GLUCOSE SERPL-MCNC: 145 MG/DL (ref 74–106)
HCT VFR BLD AUTO: 33.6 % (ref 31.2–41.9)
HGB BLD-MCNC: 10.8 G/DL (ref 10.9–14.3)
LYMPHOCYTES # BLD AUTO: 1.8 K/UL (ref 20–40)
LYMPHOCYTES NFR BLD AUTO: 32.7 % (ref 20.5–51.5)
MAGNESIUM SERPL-MCNC: 1.8 MG/DL (ref 1.8–2.4)
MCH RBC QN AUTO: 27.6 UUG (ref 24.7–32.8)
MCHC RBC AUTO-ENTMCNC: 32 G/DL (ref 32.3–35.6)
MCV RBC AUTO: 85.9 FL (ref 75.5–95.3)
MONOCYTES # BLD AUTO: 0.4 K/UL (ref 2–10)
MONOCYTES NFR BLD AUTO: 6.5 % (ref 0–11)
NEUTROPHILS # BLD AUTO: 3.2 K/UL (ref 1.8–8.9)
NEUTROPHILS NFR BLD AUTO: 56.7 % (ref 38.5–71.5)
PLATELET # BLD AUTO: 398 K/UL (ref 179–408)
POTASSIUM SERPL-SCNC: 4.6 MMOL/L (ref 3.5–5.1)
RBC # BLD AUTO: 3.91 MIL/UL (ref 3.63–4.92)
WBC # BLD AUTO: 5.6 K/UL (ref 3.8–11.8)

## 2021-03-15 RX ADMIN — LORAZEPAM PRN MG: 2 INJECTION INTRAMUSCULAR; INTRAVENOUS at 00:12

## 2021-03-15 RX ADMIN — LOSARTAN POTASSIUM SCH MG: 50 TABLET, FILM COATED ORAL at 08:44

## 2021-03-15 RX ADMIN — ATORVASTATIN CALCIUM SCH MG: 10 TABLET, FILM COATED ORAL at 08:44

## 2021-03-15 RX ADMIN — SODIUM CHLORIDE PRN UNIT: 9 INJECTION, SOLUTION INTRAVENOUS at 08:24

## 2021-03-15 RX ADMIN — PANTOPRAZOLE SODIUM SCH MG: 40 TABLET, DELAYED RELEASE ORAL at 06:17

## 2021-03-15 RX ADMIN — SODIUM CHLORIDE PRN MLS/HR: 0.45 INJECTION, SOLUTION INTRAVENOUS at 09:27

## 2021-03-15 RX ADMIN — Medication SCH EACH: at 06:42

## 2021-03-15 RX ADMIN — SODIUM CHLORIDE PRN UNIT: 9 INJECTION, SOLUTION INTRAVENOUS at 12:08

## 2021-03-15 RX ADMIN — Medication SCH EACH: at 12:06

## 2021-03-15 RX ADMIN — ASPIRIN SCH MG: 81 TABLET, CHEWABLE ORAL at 08:44

## 2021-03-15 RX ADMIN — METOPROLOL SUCCINATE SCH MG: 50 TABLET, FILM COATED, EXTENDED RELEASE ORAL at 08:44

## 2021-03-15 RX ADMIN — AMLODIPINE BESYLATE SCH MG: 10 TABLET ORAL at 08:44

## 2021-03-15 RX ADMIN — MUPIROCIN SCH GM: 20 OINTMENT TOPICAL at 08:44

## 2021-03-15 NOTE — NUR
Patient is discharged to home with [facility]. Patient is alert and 
responds to name. Breathing is even and non labored. No sob noted. Spo2 98% on room air. Son 
Raoul is aware and discharge instructions and education done. Patient's son verbalized 
understanding. Per  Emily, Home health is going to patient's house tomorrow to evaluate 
the patient. Raoul is aware and was also referred to [facility] for DME per . Son was also 
informed that per  notes, follow up appointment has been made at wound clinic and that he 
will be receiving a call from Mi from the clinic regarding appt. Son verbalized 
understanding. Informed Raoul that his mom received pneumonia vaccine on right deltoid. No 
adverse reactions noted. No bleeding or swelling on injection site. Removed patient's 
bracelet and IV, patient tolerated. Patient was cleaned prior to discharge and wound 
dressing changed. Belongings are inventoried and discharged with patient. Endorsement also 
given to [facility] EMTs. Patient left via gurney in stable condition.

## 2021-03-15 NOTE — NUR
Spoke to Raoul (patient's son) and verbalized he spoke with RUBY Diaz and was made aware that 
patient is going home today. Per Raoul, RUBY ordered air loss mattress for their hospital bed 
at home. Asked Raoul if his mom has received the flu vaccine and/or pna vaccine. He wants his 
mom to get pna vaccine. However, he adamantly refuses flu vaccine to be given. Explanation 
of risks and benefits given for both vaccines. Patient's son verbalized understanding.

## 2021-03-15 NOTE — NUR
End of Shift: Patient is AOx1, no acute distress noted. Due medications given and tolerated 
well. IV access on right hand 22G running  NS 75mL/HR. Changed dressing on sacrum. Patient 
slept intermittently. Safety precautions in place, bed is low locked position, side rails 
x2. Patient kept comfortable. Will continue to monitor and endorse to the AM shift nurse.

## 2021-03-15 NOTE — NUR
Awake, eyes open, responsive and answers to name. No respiratory distress. No facial 
grimacing noted. IV on right forearm intact and patent, hydration tolerated by the patient. 
Safety precautions in place. Kept comfortable. Call light within reach. Will continue to 
monitor.

## 2021-03-15 NOTE — NUR
Administered pneumonia vaccine on right deltoid. Patient tolerated procedure. No bleeding or 
swelling noted. Will continue to monitor for adverse reactions.

## 2021-03-15 NOTE — NUR
WOUND CARE CONSULT: PT PRESENTS WITH SACRAL DEEP TISSUE INJURY IN EVOLUTION OVER PREVIOUS 
AREA OF SCARRING, PRESENT ON ADMISSION. PT NOTED TO BE INCONTINENT OF URINE AND STOOL. 
SEVERELY CONTRACTED LEGS NOTED WITH LEGS CROSSED AT THIGH. RECOMMENDATIONS MADE FOR SKIN 
PROTECTION AND WOUND CARE. DISCUSSED WITH NURSING STAFF. PT IS ON FIRST STEP CIRRUS LOW 
[facility] BERNADINE DEGROOT IN AGREEMENT WITH PLAN OF CARE. PT HAS HOME HEALTH CARE SERVICE PER 
PROVIDER N.P. 

-------------------------------------------------------------------------------

Addendum: 03/15/21 at 1205 by TIFFANY HAMPTON RN

-------------------------------------------------------------------------------

Amended: Links added.

-------------------------------------------------------------------------------

Addendum: 03/15/21 at 1212 by TIFFANY HAMPTON RN

-------------------------------------------------------------------------------

SPOKE WITH MANN HAGAN WHO WILL COORDINATE WOUND CARE WITH HOME HEALTH SERVICE.

## 2021-03-18 ENCOUNTER — HOSPITAL ENCOUNTER (OUTPATIENT)
Dept: HOSPITAL 54 - WOU | Age: 86
Discharge: HOME HEALTH | End: 2021-03-18
Attending: SURGERY
Payer: MEDICARE

## 2021-03-18 DIAGNOSIS — Z79.82: ICD-10-CM

## 2021-03-18 DIAGNOSIS — I10: ICD-10-CM

## 2021-03-18 DIAGNOSIS — L89.153: Primary | ICD-10-CM

## 2021-03-18 DIAGNOSIS — Z79.84: ICD-10-CM

## 2021-03-18 DIAGNOSIS — E11.9: ICD-10-CM

## 2021-04-04 ENCOUNTER — HOSPITAL ENCOUNTER (INPATIENT)
Dept: HOSPITAL 12 - ER | Age: 86
LOS: 7 days | Discharge: HOME HEALTH | DRG: 871 | End: 2021-04-11
Payer: MEDICARE

## 2021-04-04 VITALS — HEIGHT: 61 IN | BODY MASS INDEX: 19.45 KG/M2 | WEIGHT: 103 LBS

## 2021-04-04 DIAGNOSIS — Z20.822: ICD-10-CM

## 2021-04-04 DIAGNOSIS — K52.9: ICD-10-CM

## 2021-04-04 DIAGNOSIS — E43: ICD-10-CM

## 2021-04-04 DIAGNOSIS — N31.9: ICD-10-CM

## 2021-04-04 DIAGNOSIS — Z79.82: ICD-10-CM

## 2021-04-04 DIAGNOSIS — D64.9: ICD-10-CM

## 2021-04-04 DIAGNOSIS — N39.0: ICD-10-CM

## 2021-04-04 DIAGNOSIS — E87.2: ICD-10-CM

## 2021-04-04 DIAGNOSIS — R53.1: ICD-10-CM

## 2021-04-04 DIAGNOSIS — N17.0: ICD-10-CM

## 2021-04-04 DIAGNOSIS — Z87.440: ICD-10-CM

## 2021-04-04 DIAGNOSIS — E11.9: ICD-10-CM

## 2021-04-04 DIAGNOSIS — F03.90: ICD-10-CM

## 2021-04-04 DIAGNOSIS — I25.10: ICD-10-CM

## 2021-04-04 DIAGNOSIS — M19.90: ICD-10-CM

## 2021-04-04 DIAGNOSIS — E88.09: ICD-10-CM

## 2021-04-04 DIAGNOSIS — B96.20: ICD-10-CM

## 2021-04-04 DIAGNOSIS — Z79.84: ICD-10-CM

## 2021-04-04 DIAGNOSIS — E78.5: ICD-10-CM

## 2021-04-04 DIAGNOSIS — A41.9: Primary | ICD-10-CM

## 2021-04-04 DIAGNOSIS — I10: ICD-10-CM

## 2021-04-04 DIAGNOSIS — Z16.12: ICD-10-CM

## 2021-04-04 DIAGNOSIS — G92: ICD-10-CM

## 2021-04-04 DIAGNOSIS — B95.1: ICD-10-CM

## 2021-04-04 LAB
ALP SERPL-CCNC: 82 U/L (ref 50–136)
ALT SERPL W/O P-5'-P-CCNC: 13 U/L (ref 14–59)
AST SERPL-CCNC: 12 U/L (ref 15–37)
BASOPHILS # BLD AUTO: 0 K/UL (ref 0–8)
BASOPHILS NFR BLD AUTO: 0.3 % (ref 0–2)
BILIRUB DIRECT SERPL-MCNC: 0.1 MG/DL (ref 0–0.2)
BILIRUB SERPL-MCNC: 0.4 MG/DL (ref 0.2–1)
BUN SERPL-MCNC: 43 MG/DL (ref 7–18)
CHLORIDE SERPL-SCNC: 102 MMOL/L (ref 98–107)
CO2 SERPL-SCNC: 20 MMOL/L (ref 21–32)
CREAT SERPL-MCNC: 1.5 MG/DL (ref 0.6–1.3)
EOSINOPHIL # BLD AUTO: 0 K/UL (ref 0–0.7)
EOSINOPHIL NFR BLD AUTO: 0.1 % (ref 0–7)
GLUCOSE SERPL-MCNC: 216 MG/DL (ref 74–106)
HCT VFR BLD AUTO: 33 % (ref 31.2–41.9)
HGB BLD-MCNC: 10.5 G/DL (ref 10.9–14.3)
LYMPHOCYTES # BLD AUTO: 0.6 K/UL (ref 20–40)
LYMPHOCYTES NFR BLD AUTO: 4.1 % (ref 20.5–51.5)
MCH RBC QN AUTO: 27 UUG (ref 24.7–32.8)
MCHC RBC AUTO-ENTMCNC: 32 G/DL (ref 32.3–35.6)
MCV RBC AUTO: 84.9 FL (ref 75.5–95.3)
MONOCYTES # BLD AUTO: 0.9 K/UL (ref 2–10)
MONOCYTES NFR BLD AUTO: 6.4 % (ref 0–11)
NEUTROPHILS # BLD AUTO: 13.1 K/UL (ref 1.8–8.9)
NEUTROPHILS NFR BLD AUTO: 89.1 % (ref 38.5–71.5)
PLATELET # BLD AUTO: 294 K/UL (ref 179–408)
POTASSIUM SERPL-SCNC: 4 MMOL/L (ref 3.5–5.1)
RBC # BLD AUTO: 3.89 MIL/UL (ref 3.63–4.92)
WBC # BLD AUTO: 14.7 K/UL (ref 3.8–11.8)
WS STN SPEC: 7.7 G/DL (ref 6.4–8.2)

## 2021-04-04 PROCEDURE — G0378 HOSPITAL OBSERVATION PER HR: HCPCS

## 2021-04-04 PROCEDURE — A4663 DIALYSIS BLOOD PRESSURE CUFF: HCPCS

## 2021-04-04 NOTE — NUR
Patient brought in by ambulance for generalized weakness. Placed in RM 2B. NAD 
noted, awaiting MD fletcher. SR up x 2 for saftey.

## 2021-04-05 VITALS — SYSTOLIC BLOOD PRESSURE: 108 MMHG | DIASTOLIC BLOOD PRESSURE: 43 MMHG

## 2021-04-05 VITALS — SYSTOLIC BLOOD PRESSURE: 120 MMHG | DIASTOLIC BLOOD PRESSURE: 62 MMHG

## 2021-04-05 VITALS — SYSTOLIC BLOOD PRESSURE: 104 MMHG | DIASTOLIC BLOOD PRESSURE: 47 MMHG

## 2021-04-05 VITALS — DIASTOLIC BLOOD PRESSURE: 72 MMHG | SYSTOLIC BLOOD PRESSURE: 99 MMHG

## 2021-04-05 VITALS — DIASTOLIC BLOOD PRESSURE: 65 MMHG | SYSTOLIC BLOOD PRESSURE: 127 MMHG

## 2021-04-05 VITALS — SYSTOLIC BLOOD PRESSURE: 101 MMHG | DIASTOLIC BLOOD PRESSURE: 40 MMHG

## 2021-04-05 LAB
ALP SERPL-CCNC: 67 U/L (ref 50–136)
ALT SERPL W/O P-5'-P-CCNC: 13 U/L (ref 14–59)
AMORPH URATE CRY URNS QL MICRO: (no result) /HPF
APPEARANCE UR: (no result)
AST SERPL-CCNC: 22 U/L (ref 15–37)
BASOPHILS # BLD AUTO: 0.1 K/UL (ref 0–8)
BASOPHILS NFR BLD AUTO: 0.5 % (ref 0–2)
BILIRUB SERPL-MCNC: 0.5 MG/DL (ref 0.2–1)
BILIRUB UR QL STRIP: (no result)
BUN SERPL-MCNC: 39 MG/DL (ref 7–18)
CHLORIDE SERPL-SCNC: 104 MMOL/L (ref 98–107)
CHOLEST SERPL-MCNC: 98 MG/DL (ref ?–200)
CO2 SERPL-SCNC: 20 MMOL/L (ref 21–32)
COLOR UR: (no result)
CREAT SERPL-MCNC: 1.4 MG/DL (ref 0.6–1.3)
DEPRECATED SQUAMOUS URNS QL MICRO: (no result) /HPF
EOSINOPHIL # BLD AUTO: 0.1 K/UL (ref 0–0.7)
EOSINOPHIL NFR BLD AUTO: 0.6 % (ref 0–7)
GLUCOSE SERPL-MCNC: 257 MG/DL (ref 74–106)
GLUCOSE UR STRIP-MCNC: NEGATIVE MG/DL
HCT VFR BLD AUTO: 28.3 % (ref 31.2–41.9)
HDLC SERPL-MCNC: 63 MG/DL (ref 40–60)
HGB BLD-MCNC: 9.1 G/DL (ref 10.9–14.3)
HGB UR QL STRIP: (no result)
KETONES UR STRIP-MCNC: (no result) MG/DL
LEUKOCYTE ESTERASE UR QL STRIP: (no result)
LYMPHOCYTES # BLD AUTO: 1.7 K/UL (ref 20–40)
LYMPHOCYTES NFR BLD AUTO: 11.9 % (ref 20.5–51.5)
MAGNESIUM SERPL-MCNC: 1.9 MG/DL (ref 1.8–2.4)
MCH RBC QN AUTO: 28 UUG (ref 24.7–32.8)
MCHC RBC AUTO-ENTMCNC: 32 G/DL (ref 32.3–35.6)
MCV RBC AUTO: 87.1 FL (ref 75.5–95.3)
MONOCYTES # BLD AUTO: 0.9 K/UL (ref 2–10)
MONOCYTES NFR BLD AUTO: 6.6 % (ref 0–11)
MUCOUS THREADS URNS QL MICRO: (no result) /LPF
NEUTROPHILS # BLD AUTO: 11.3 K/UL (ref 1.8–8.9)
NEUTROPHILS NFR BLD AUTO: 80.4 % (ref 38.5–71.5)
NITRITE UR QL STRIP: NEGATIVE
PH UR STRIP: 5.5 [PH] (ref 5–8)
PHOSPHATE SERPL-MCNC: 4.5 MG/DL (ref 2.5–4.9)
PLATELET # BLD AUTO: 244 K/UL (ref 179–408)
POTASSIUM SERPL-SCNC: 5.3 MMOL/L (ref 3.5–5.1)
RBC # BLD AUTO: 3.24 MIL/UL (ref 3.63–4.92)
SP GR UR STRIP: >=1.03 (ref 1–1.03)
TRIGL SERPL-MCNC: 47 MG/DL (ref 30–150)
TSH SERPL DL<=0.005 MIU/L-ACNC: 4.59 MIU/ML (ref 0.36–3.74)
UROBILINOGEN UR STRIP-MCNC: 0.2 E.U./DL
WBC # BLD AUTO: 14.1 K/UL (ref 3.8–11.8)
WBC #/AREA URNS HPF: (no result) /HPF
WBC #/AREA URNS HPF: (no result) /HPF (ref 0–3)
WS STN SPEC: 7 G/DL (ref 6.4–8.2)

## 2021-04-05 RX ADMIN — SODIUM CHLORIDE PRN MLS/HR: 0.9 INJECTION, SOLUTION INTRAVENOUS at 21:22

## 2021-04-05 RX ADMIN — PIPERACILLIN SODIUM AND TAZOBACTAM SODIUM SCH MLS/HR: .375; 3 INJECTION, POWDER, LYOPHILIZED, FOR SOLUTION INTRAVENOUS at 16:32

## 2021-04-05 RX ADMIN — PIPERACILLIN SODIUM AND TAZOBACTAM SODIUM SCH MLS/HR: .375; 3 INJECTION, POWDER, LYOPHILIZED, FOR SOLUTION INTRAVENOUS at 21:11

## 2021-04-05 RX ADMIN — PIPERACILLIN SODIUM AND TAZOBACTAM SODIUM SCH MLS/HR: .375; 3 INJECTION, POWDER, LYOPHILIZED, FOR SOLUTION INTRAVENOUS at 09:58

## 2021-04-05 RX ADMIN — ASPIRIN SCH MG: 81 TABLET, CHEWABLE ORAL at 08:27

## 2021-04-05 RX ADMIN — METOPROLOL SUCCINATE SCH MG: 50 TABLET, FILM COATED, EXTENDED RELEASE ORAL at 08:27

## 2021-04-05 RX ADMIN — METFORMIN HYDROCHLORIDE SCH MG: 500 TABLET ORAL at 08:28

## 2021-04-05 RX ADMIN — AMLODIPINE BESYLATE SCH MG: 10 TABLET ORAL at 08:28

## 2021-04-05 RX ADMIN — METFORMIN HYDROCHLORIDE SCH MG: 500 TABLET ORAL at 17:18

## 2021-04-05 RX ADMIN — METFORMIN HYDROCHLORIDE SCH MG: 500 TABLET ORAL at 12:17

## 2021-04-05 RX ADMIN — LOSARTAN POTASSIUM SCH MG: 50 TABLET, FILM COATED ORAL at 08:28

## 2021-04-05 RX ADMIN — SODIUM CHLORIDE PRN MLS/HR: 0.9 INJECTION, SOLUTION INTRAVENOUS at 03:48

## 2021-04-05 NOTE — NUR
Awake and responsive to stimuli. On room air. No distress noted. Iv site intact and patent, 
tolerating IV hydration. Quigley catheter intact, straw colored urine with sediments noted. No 
facial grimacing noted. Bed is low and locked. Call light within reach. Will continue to 
monitor.

## 2021-04-05 NOTE — NUR
Dr. Recinos on panel call with Charlie Brown DNP. Patient accepted for admission to 
[facility], diagnosis: urosepsis.

## 2021-04-05 NOTE — NUR
WOUND CARE CONSULT: PT PRESENTS WITH RT HEEL INTACT DEEP TISSUE INJURY AND SACRAL SCARRING 
WITH INCONTINENCE ASSOCIATED SKIN DAMAGE OVER SCARRING, PRESENT ON ADMISSION. 
RECOMMENDATIONS MADE FOR SKIN PROTECTION AND WOUND CARE. DISCUSSED WITH NURSING STAFF. MD IN 
AGREEMENT WITH PLAN OF CARE. 

-------------------------------------------------------------------------------

Addendum: 04/05/21 at 1236 by TIFFANY HAMPTON RN

-------------------------------------------------------------------------------

Amended: Links added.

## 2021-04-05 NOTE — NUR
PATIENT AWAKE BUT WITH SOME CONFUSION, NO SOB NO CHEST PAIN. PATIENT WITH EPISODE OF 
RESTLESS WHILE IN BED, NO S/S OF PAIN AT THIS TIME,REDIRECT BEHAVIOR,TURN AND REPOSITION, 
HAHN CATH PATENT, CONT TO MONITOR

## 2021-04-05 NOTE — NUR
Pt. admitted to telemetry, under care of Dr. Brown admission from ER, 84 yo female, with 
diagnosis of sepsis. Awake, confused, room air saturating @98%, no SOB noted. No acute 
distress noted at this time. Belongs List completed. Routine admission care rendered. Noted 
partial thickness loss on sacral area. Mepilex applied. Noted with pressure sore on heel. 
Wound care consult ordered. No reports of pain, pt on bedrest, first step mattress ordered. 
Quigley intact, draining halle, cloudy urine with sediments noted. IV access on the right FA 
22g running NS@ 75cc. Infusing IV zosyn no adverse reactions noted. Bed in low and locked 
position, call light within reach, fall and safety precautions in place, will continue with 
plan of care.

## 2021-04-05 NOTE — NUR
PATIENT IN BED RESPONSIVE TO STIMULI. IN NO ACUTE DISTRESS. ON KCI MATTRESS TOLERATED. SEEN 
BY [facility] WITH ORDERS. ON IV ATB ZOSYN AND VANCOMYCIN. NO ADVERSE REACTION NOTED. 
NEEDS ATTENDED. SAFETY MEASURES MAINTAINED. KEPT COMFORTABLE. CALL LIGHT WITHIN REACH. WILL 
CONTINUE TO MONITOR.

## 2021-04-06 VITALS — SYSTOLIC BLOOD PRESSURE: 117 MMHG | DIASTOLIC BLOOD PRESSURE: 71 MMHG

## 2021-04-06 VITALS — DIASTOLIC BLOOD PRESSURE: 50 MMHG | SYSTOLIC BLOOD PRESSURE: 122 MMHG

## 2021-04-06 VITALS — SYSTOLIC BLOOD PRESSURE: 116 MMHG | DIASTOLIC BLOOD PRESSURE: 44 MMHG

## 2021-04-06 VITALS — SYSTOLIC BLOOD PRESSURE: 107 MMHG | DIASTOLIC BLOOD PRESSURE: 46 MMHG

## 2021-04-06 VITALS — DIASTOLIC BLOOD PRESSURE: 79 MMHG | SYSTOLIC BLOOD PRESSURE: 133 MMHG

## 2021-04-06 LAB
BASOPHILS # BLD AUTO: 0.1 K/UL (ref 0–8)
BASOPHILS NFR BLD AUTO: 0.8 % (ref 0–2)
BUN SERPL-MCNC: 28 MG/DL (ref 7–18)
CHLORIDE SERPL-SCNC: 108 MMOL/L (ref 98–107)
CO2 SERPL-SCNC: 23 MMOL/L (ref 21–32)
CREAT SERPL-MCNC: 1.1 MG/DL (ref 0.6–1.3)
EOSINOPHIL # BLD AUTO: 0.1 K/UL (ref 0–0.7)
EOSINOPHIL NFR BLD AUTO: 1.5 % (ref 0–7)
GLUCOSE SERPL-MCNC: 125 MG/DL (ref 74–106)
HCT VFR BLD AUTO: 31.3 % (ref 31.2–41.9)
HGB BLD-MCNC: 10 G/DL (ref 10.9–14.3)
LYMPHOCYTES # BLD AUTO: 1.6 K/UL (ref 20–40)
LYMPHOCYTES NFR BLD AUTO: 16.4 % (ref 20.5–51.5)
MAGNESIUM SERPL-MCNC: 1.7 MG/DL (ref 1.8–2.4)
MCH RBC QN AUTO: 27 UUG (ref 24.7–32.8)
MCHC RBC AUTO-ENTMCNC: 32 G/DL (ref 32.3–35.6)
MCV RBC AUTO: 84.8 FL (ref 75.5–95.3)
MONOCYTES # BLD AUTO: 0.5 K/UL (ref 2–10)
MONOCYTES NFR BLD AUTO: 5.7 % (ref 0–11)
NEUTROPHILS # BLD AUTO: 7.2 K/UL (ref 1.8–8.9)
NEUTROPHILS NFR BLD AUTO: 75.6 % (ref 38.5–71.5)
PHOSPHATE SERPL-MCNC: 2.9 MG/DL (ref 2.5–4.9)
PLATELET # BLD AUTO: 273 K/UL (ref 179–408)
POTASSIUM SERPL-SCNC: 4.4 MMOL/L (ref 3.5–5.1)
RBC # BLD AUTO: 3.69 MIL/UL (ref 3.63–4.92)
WBC # BLD AUTO: 9.5 K/UL (ref 3.8–11.8)

## 2021-04-06 RX ADMIN — MAGNESIUM SULFATE IN DEXTROSE SCH MLS/HR: 10 INJECTION, SOLUTION INTRAVENOUS at 12:19

## 2021-04-06 RX ADMIN — PIPERACILLIN SODIUM AND TAZOBACTAM SODIUM SCH MLS/HR: .375; 3 INJECTION, POWDER, LYOPHILIZED, FOR SOLUTION INTRAVENOUS at 16:11

## 2021-04-06 RX ADMIN — LOSARTAN POTASSIUM SCH MG: 50 TABLET, FILM COATED ORAL at 08:33

## 2021-04-06 RX ADMIN — MAGNESIUM SULFATE IN DEXTROSE SCH MLS/HR: 10 INJECTION, SOLUTION INTRAVENOUS at 11:10

## 2021-04-06 RX ADMIN — PIPERACILLIN SODIUM AND TAZOBACTAM SODIUM SCH MLS/HR: .375; 3 INJECTION, POWDER, LYOPHILIZED, FOR SOLUTION INTRAVENOUS at 10:24

## 2021-04-06 RX ADMIN — SODIUM CHLORIDE PRN MLS/HR: 0.9 INJECTION, SOLUTION INTRAVENOUS at 23:28

## 2021-04-06 RX ADMIN — ASPIRIN SCH MG: 81 TABLET, CHEWABLE ORAL at 08:33

## 2021-04-06 RX ADMIN — METOPROLOL SUCCINATE SCH MG: 50 TABLET, FILM COATED, EXTENDED RELEASE ORAL at 08:33

## 2021-04-06 RX ADMIN — METFORMIN HYDROCHLORIDE SCH MG: 500 TABLET ORAL at 12:07

## 2021-04-06 RX ADMIN — ACETAMINOPHEN PRN MG: 325 TABLET ORAL at 02:42

## 2021-04-06 RX ADMIN — METFORMIN HYDROCHLORIDE SCH MG: 500 TABLET ORAL at 08:33

## 2021-04-06 RX ADMIN — AMLODIPINE BESYLATE SCH MG: 10 TABLET ORAL at 08:33

## 2021-04-06 RX ADMIN — PIPERACILLIN SODIUM AND TAZOBACTAM SODIUM SCH MLS/HR: .375; 3 INJECTION, POWDER, LYOPHILIZED, FOR SOLUTION INTRAVENOUS at 04:50

## 2021-04-06 RX ADMIN — METFORMIN HYDROCHLORIDE SCH MG: 500 TABLET ORAL at 17:15

## 2021-04-06 RX ADMIN — PIPERACILLIN SODIUM AND TAZOBACTAM SODIUM SCH MLS/HR: .375; 3 INJECTION, POWDER, LYOPHILIZED, FOR SOLUTION INTRAVENOUS at 21:17

## 2021-04-06 NOTE — NUR
Alert and responsive to name. No resp distress. IV hydration tolerated. Quigley catheter 
draining yellow urine. On IV Zosyn and Vancomycin. No adverse reaction noted. Made bm today 
and treatment done. Patient kept comfortable. Will endorse accordingly.

## 2021-04-06 NOTE — NUR
PATIENT AWAKE NO SOB NO CHEST PAIN. PATIENT TELE MONITOR SINUS RHYTHM, WITH EPISODE OF NON 
COMPLIANT WITH CARE, CONT TO REORIENT PATIENT, KEPT COMFORTABLE.

## 2021-04-06 NOTE — NUR
Patient is asleep but arousable to name. No respiratory distress noted. No facial grimacing. 
IV site on RFA intact hydration tolerated. Bed is low and locked. Safety measures 
maintained. Kept comfortable. Call light within reach. Will continue to monitor.

## 2021-04-07 VITALS — SYSTOLIC BLOOD PRESSURE: 135 MMHG | DIASTOLIC BLOOD PRESSURE: 61 MMHG

## 2021-04-07 VITALS — SYSTOLIC BLOOD PRESSURE: 138 MMHG | DIASTOLIC BLOOD PRESSURE: 43 MMHG

## 2021-04-07 VITALS — SYSTOLIC BLOOD PRESSURE: 127 MMHG | DIASTOLIC BLOOD PRESSURE: 43 MMHG

## 2021-04-07 VITALS — SYSTOLIC BLOOD PRESSURE: 108 MMHG | DIASTOLIC BLOOD PRESSURE: 43 MMHG

## 2021-04-07 VITALS — SYSTOLIC BLOOD PRESSURE: 121 MMHG | DIASTOLIC BLOOD PRESSURE: 33 MMHG

## 2021-04-07 LAB
BASOPHILS # BLD AUTO: 0.1 K/UL (ref 0–8)
BASOPHILS NFR BLD AUTO: 0.7 % (ref 0–2)
BILIRUB DIRECT SERPL-MCNC: 0.1 MG/DL (ref 0–0.2)
BILIRUB SERPL-MCNC: 0.4 MG/DL (ref 0.2–1)
BUN SERPL-MCNC: 25 MG/DL (ref 7–18)
CHLORIDE SERPL-SCNC: 104 MMOL/L (ref 98–107)
CO2 SERPL-SCNC: 24 MMOL/L (ref 21–32)
CREAT SERPL-MCNC: 0.9 MG/DL (ref 0.6–1.3)
EOSINOPHIL # BLD AUTO: 0.2 K/UL (ref 0–0.7)
EOSINOPHIL NFR BLD AUTO: 2 % (ref 0–7)
GLUCOSE SERPL-MCNC: 283 MG/DL (ref 74–106)
HCT VFR BLD AUTO: 32.5 % (ref 31.2–41.9)
HGB BLD-MCNC: 10.5 G/DL (ref 10.9–14.3)
LYMPHOCYTES # BLD AUTO: 1.4 K/UL (ref 20–40)
LYMPHOCYTES NFR BLD AUTO: 17.4 % (ref 20.5–51.5)
MAGNESIUM SERPL-MCNC: 1.9 MG/DL (ref 1.8–2.4)
MCH RBC QN AUTO: 27.3 UUG (ref 24.7–32.8)
MCHC RBC AUTO-ENTMCNC: 32 G/DL (ref 32.3–35.6)
MCV RBC AUTO: 84.3 FL (ref 75.5–95.3)
MONOCYTES # BLD AUTO: 0.5 K/UL (ref 2–10)
MONOCYTES NFR BLD AUTO: 5.5 % (ref 0–11)
NEUTROPHILS # BLD AUTO: 6.2 K/UL (ref 1.8–8.9)
NEUTROPHILS NFR BLD AUTO: 74.4 % (ref 38.5–71.5)
PLATELET # BLD AUTO: 284 K/UL (ref 179–408)
POTASSIUM SERPL-SCNC: 4.4 MMOL/L (ref 3.5–5.1)
RBC # BLD AUTO: 3.85 MIL/UL (ref 3.63–4.92)
WBC # BLD AUTO: 8.3 K/UL (ref 3.8–11.8)

## 2021-04-07 RX ADMIN — LOSARTAN POTASSIUM SCH MG: 50 TABLET, FILM COATED ORAL at 09:17

## 2021-04-07 RX ADMIN — METFORMIN HYDROCHLORIDE SCH MG: 500 TABLET ORAL at 12:21

## 2021-04-07 RX ADMIN — METOPROLOL SUCCINATE SCH MG: 50 TABLET, FILM COATED, EXTENDED RELEASE ORAL at 09:18

## 2021-04-07 RX ADMIN — NITROFURANTOIN (MONOHYDRATE/MACROCRYSTALS) SCH MG: 75; 25 CAPSULE ORAL at 20:53

## 2021-04-07 RX ADMIN — METFORMIN HYDROCHLORIDE SCH MG: 500 TABLET ORAL at 08:36

## 2021-04-07 RX ADMIN — PIPERACILLIN SODIUM AND TAZOBACTAM SODIUM SCH MLS/HR: .375; 3 INJECTION, POWDER, LYOPHILIZED, FOR SOLUTION INTRAVENOUS at 04:15

## 2021-04-07 RX ADMIN — SODIUM CHLORIDE PRN MLS/HR: 0.9 INJECTION, SOLUTION INTRAVENOUS at 12:42

## 2021-04-07 RX ADMIN — Medication SCH ML: at 16:58

## 2021-04-07 RX ADMIN — PIPERACILLIN SODIUM AND TAZOBACTAM SODIUM SCH MLS/HR: .375; 3 INJECTION, POWDER, LYOPHILIZED, FOR SOLUTION INTRAVENOUS at 10:02

## 2021-04-07 RX ADMIN — METFORMIN HYDROCHLORIDE SCH MG: 500 TABLET ORAL at 16:57

## 2021-04-07 RX ADMIN — ACETAMINOPHEN PRN MG: 325 TABLET ORAL at 20:54

## 2021-04-07 RX ADMIN — AMLODIPINE BESYLATE SCH MG: 10 TABLET ORAL at 09:18

## 2021-04-07 RX ADMIN — ACETAMINOPHEN PRN MG: 325 TABLET ORAL at 15:12

## 2021-04-07 RX ADMIN — ASPIRIN SCH MG: 81 TABLET, CHEWABLE ORAL at 08:37

## 2021-04-07 NOTE — NUR
Patient slept intermittently.Confused. No acute distress noted. On RA.HOB 
elevated.Aspiration precaution observed at all times. Quigley catheter in place draining well. 
Iv on right FA 22g patent and intact with NS at 75 cc/hr.Adm IV ATB for UTI  as ordered. No 
a/r noted. Tolerated well. Wound care done. Will endorse to oncoming shift.

## 2021-04-07 NOTE — NUR
pt alert to self with episode of confusion. no SOB noted, on pureed diet able to eat 
breakfast, lunch, and dinner 100% with assistance. have johnson cath- intact, urine drain by 
gravity, clear and yellow. lactic acid is 2.1. pt was reposition every Q2h and as needed. 
good giacomo care done. wound care was done, mepilex and z-guard administered. continue with IV 
ATB and NS at 75 cc/hr, IV site intact no edema or redness. needs attended to and met. will 
continue to monitor.

## 2021-04-08 VITALS — SYSTOLIC BLOOD PRESSURE: 115 MMHG | DIASTOLIC BLOOD PRESSURE: 94 MMHG

## 2021-04-08 VITALS — SYSTOLIC BLOOD PRESSURE: 128 MMHG | DIASTOLIC BLOOD PRESSURE: 75 MMHG

## 2021-04-08 VITALS — SYSTOLIC BLOOD PRESSURE: 138 MMHG | DIASTOLIC BLOOD PRESSURE: 32 MMHG

## 2021-04-08 VITALS — SYSTOLIC BLOOD PRESSURE: 162 MMHG | DIASTOLIC BLOOD PRESSURE: 65 MMHG

## 2021-04-08 VITALS — DIASTOLIC BLOOD PRESSURE: 51 MMHG | SYSTOLIC BLOOD PRESSURE: 132 MMHG

## 2021-04-08 LAB
BASOPHILS # BLD AUTO: 0 K/UL (ref 0–8)
BASOPHILS NFR BLD AUTO: 0.4 % (ref 0–2)
BUN SERPL-MCNC: 33 MG/DL (ref 7–18)
CHLORIDE SERPL-SCNC: 104 MMOL/L (ref 98–107)
CO2 SERPL-SCNC: 24 MMOL/L (ref 21–32)
CREAT SERPL-MCNC: 1 MG/DL (ref 0.6–1.3)
EOSINOPHIL # BLD AUTO: 0.2 K/UL (ref 0–0.7)
EOSINOPHIL NFR BLD AUTO: 1.4 % (ref 0–7)
GLUCOSE SERPL-MCNC: 456 MG/DL (ref 74–106)
HCT VFR BLD AUTO: 35.7 % (ref 31.2–41.9)
HGB BLD-MCNC: 11.2 G/DL (ref 10.9–14.3)
LYMPHOCYTES # BLD AUTO: 0.7 K/UL (ref 20–40)
LYMPHOCYTES NFR BLD AUTO: 6.6 % (ref 20.5–51.5)
MAGNESIUM SERPL-MCNC: 1.7 MG/DL (ref 1.8–2.4)
MCH RBC QN AUTO: 27 UUG (ref 24.7–32.8)
MCHC RBC AUTO-ENTMCNC: 31 G/DL (ref 32.3–35.6)
MCV RBC AUTO: 86.3 FL (ref 75.5–95.3)
MONOCYTES # BLD AUTO: 0.6 K/UL (ref 2–10)
MONOCYTES NFR BLD AUTO: 4.9 % (ref 0–11)
NEUTROPHILS # BLD AUTO: 9.8 K/UL (ref 1.8–8.9)
NEUTROPHILS NFR BLD AUTO: 86.7 % (ref 38.5–71.5)
PLATELET # BLD AUTO: 310 K/UL (ref 179–408)
POTASSIUM SERPL-SCNC: 4.3 MMOL/L (ref 3.5–5.1)
RBC # BLD AUTO: 4.14 MIL/UL (ref 3.63–4.92)
WBC # BLD AUTO: 11.3 K/UL (ref 3.8–11.8)

## 2021-04-08 RX ADMIN — NITROFURANTOIN (MONOHYDRATE/MACROCRYSTALS) SCH MG: 75; 25 CAPSULE ORAL at 20:40

## 2021-04-08 RX ADMIN — METFORMIN HYDROCHLORIDE SCH MG: 500 TABLET ORAL at 12:33

## 2021-04-08 RX ADMIN — SODIUM CHLORIDE PRN MLS/HR: 0.9 INJECTION, SOLUTION INTRAVENOUS at 04:01

## 2021-04-08 RX ADMIN — ASPIRIN SCH MG: 81 TABLET, CHEWABLE ORAL at 08:00

## 2021-04-08 RX ADMIN — Medication SCH ML: at 17:04

## 2021-04-08 RX ADMIN — Medication SCH ML: at 08:02

## 2021-04-08 RX ADMIN — SODIUM CHLORIDE PRN UNIT: 9 INJECTION, SOLUTION INTRAVENOUS at 20:42

## 2021-04-08 RX ADMIN — SODIUM CHLORIDE PRN UNIT: 9 INJECTION, SOLUTION INTRAVENOUS at 17:11

## 2021-04-08 RX ADMIN — METFORMIN HYDROCHLORIDE SCH MG: 500 TABLET ORAL at 07:59

## 2021-04-08 RX ADMIN — Medication SCH EACH: at 17:03

## 2021-04-08 RX ADMIN — Medication SCH EACH: at 20:40

## 2021-04-08 RX ADMIN — LOSARTAN POTASSIUM SCH MG: 50 TABLET, FILM COATED ORAL at 08:02

## 2021-04-08 RX ADMIN — NITROFURANTOIN (MONOHYDRATE/MACROCRYSTALS) SCH MG: 75; 25 CAPSULE ORAL at 08:01

## 2021-04-08 RX ADMIN — METOPROLOL SUCCINATE SCH MG: 50 TABLET, FILM COATED, EXTENDED RELEASE ORAL at 08:01

## 2021-04-08 RX ADMIN — AMLODIPINE BESYLATE SCH MG: 10 TABLET ORAL at 08:01

## 2021-04-08 NOTE — NUR
PT SLEPT INTERMITTENTLY. PT IN NO ACUTE DISTRESS. IV INTACT. TYLENOL PRN GIVEN AT 2054H FOR 
GENERALIZED PAIN. PT TURNED AND REPOSITIONED Q2H. WOUND TREATMENT DONE.  PT TOLERATED IT 
WELL. PT SOMETIMES COMBATIVE WHEN CHANGING THE DIAPER AND POSITION OF THE PT. SAFETY AND 
COMFORT PROVIDED.. ALL NEEDS ARE MET. WILL ENDORSE TO INCOMING NURSE FOR CONTINUITY OF CARE.

## 2021-04-08 NOTE — NUR
Received patient lying in bed. Alert but disoriented and non-verbal. In no acute distress. 
No signs or symptoms of pain or SOB. Sinus tachy on tele at 108. Iv site on left FA intact 
and patent. IVF infusing. Safety measure initiated and call bell within reached. Continue to 
monitor.

## 2021-04-09 VITALS — DIASTOLIC BLOOD PRESSURE: 52 MMHG | SYSTOLIC BLOOD PRESSURE: 130 MMHG

## 2021-04-09 VITALS — DIASTOLIC BLOOD PRESSURE: 60 MMHG | SYSTOLIC BLOOD PRESSURE: 130 MMHG

## 2021-04-09 VITALS — DIASTOLIC BLOOD PRESSURE: 69 MMHG | SYSTOLIC BLOOD PRESSURE: 133 MMHG

## 2021-04-09 VITALS — DIASTOLIC BLOOD PRESSURE: 61 MMHG | SYSTOLIC BLOOD PRESSURE: 118 MMHG

## 2021-04-09 VITALS — DIASTOLIC BLOOD PRESSURE: 62 MMHG | SYSTOLIC BLOOD PRESSURE: 116 MMHG

## 2021-04-09 LAB
BASOPHILS # BLD AUTO: 0 K/UL (ref 0–8)
BASOPHILS NFR BLD AUTO: 0.4 % (ref 0–2)
BUN SERPL-MCNC: 32 MG/DL (ref 7–18)
CHLORIDE SERPL-SCNC: 109 MMOL/L (ref 98–107)
CO2 SERPL-SCNC: 26 MMOL/L (ref 21–32)
CREAT SERPL-MCNC: 1 MG/DL (ref 0.6–1.3)
EOSINOPHIL # BLD AUTO: 0.1 K/UL (ref 0–0.7)
EOSINOPHIL NFR BLD AUTO: 1 % (ref 0–7)
GLUCOSE SERPL-MCNC: 264 MG/DL (ref 74–106)
HCT VFR BLD AUTO: 28.1 % (ref 31.2–41.9)
HGB BLD-MCNC: 9.3 G/DL (ref 10.9–14.3)
LYMPHOCYTES # BLD AUTO: 0.8 K/UL (ref 20–40)
LYMPHOCYTES NFR BLD AUTO: 7.3 % (ref 20.5–51.5)
MAGNESIUM SERPL-MCNC: 1.4 MG/DL (ref 1.8–2.4)
MCH RBC QN AUTO: 28.2 UUG (ref 24.7–32.8)
MCHC RBC AUTO-ENTMCNC: 33 G/DL (ref 32.3–35.6)
MCV RBC AUTO: 84.9 FL (ref 75.5–95.3)
MONOCYTES # BLD AUTO: 0.6 K/UL (ref 2–10)
MONOCYTES NFR BLD AUTO: 5.9 % (ref 0–11)
NEUTROPHILS # BLD AUTO: 9.2 K/UL (ref 1.8–8.9)
NEUTROPHILS NFR BLD AUTO: 85.4 % (ref 38.5–71.5)
PLATELET # BLD AUTO: 250 K/UL (ref 179–408)
POTASSIUM SERPL-SCNC: 3.4 MMOL/L (ref 3.5–5.1)
RBC # BLD AUTO: 3.31 MIL/UL (ref 3.63–4.92)
WBC # BLD AUTO: 10.8 K/UL (ref 3.8–11.8)

## 2021-04-09 RX ADMIN — METOPROLOL SUCCINATE SCH MG: 50 TABLET, FILM COATED, EXTENDED RELEASE ORAL at 08:42

## 2021-04-09 RX ADMIN — SODIUM CHLORIDE PRN UNIT: 9 INJECTION, SOLUTION INTRAVENOUS at 17:11

## 2021-04-09 RX ADMIN — NITROFURANTOIN (MONOHYDRATE/MACROCRYSTALS) SCH MG: 75; 25 CAPSULE ORAL at 20:40

## 2021-04-09 RX ADMIN — ACETAMINOPHEN PRN MG: 325 TABLET ORAL at 20:40

## 2021-04-09 RX ADMIN — NITROFURANTOIN (MONOHYDRATE/MACROCRYSTALS) SCH MG: 75; 25 CAPSULE ORAL at 08:42

## 2021-04-09 RX ADMIN — Medication SCH EACH: at 20:39

## 2021-04-09 RX ADMIN — Medication SCH EACH: at 16:26

## 2021-04-09 RX ADMIN — Medication SCH ML: at 17:10

## 2021-04-09 RX ADMIN — Medication SCH ML: at 08:43

## 2021-04-09 RX ADMIN — LOSARTAN POTASSIUM SCH MG: 50 TABLET, FILM COATED ORAL at 08:42

## 2021-04-09 RX ADMIN — ASPIRIN SCH MG: 81 TABLET, CHEWABLE ORAL at 10:30

## 2021-04-09 RX ADMIN — Medication SCH EACH: at 06:44

## 2021-04-09 RX ADMIN — SODIUM CHLORIDE PRN UNIT: 9 INJECTION, SOLUTION INTRAVENOUS at 11:53

## 2021-04-09 RX ADMIN — AMLODIPINE BESYLATE SCH MG: 10 TABLET ORAL at 08:42

## 2021-04-09 RX ADMIN — Medication SCH EACH: at 11:49

## 2021-04-09 RX ADMIN — SODIUM CHLORIDE PRN UNIT: 9 INJECTION, SOLUTION INTRAVENOUS at 20:40

## 2021-04-09 RX ADMIN — SODIUM CHLORIDE PRN MLS/HR: 0.9 INJECTION, SOLUTION INTRAVENOUS at 17:15

## 2021-04-09 RX ADMIN — SODIUM CHLORIDE PRN UNIT: 9 INJECTION, SOLUTION INTRAVENOUS at 08:46

## 2021-04-09 RX ADMIN — ENOXAPARIN SODIUM SCH MG: 40 INJECTION SUBCUTANEOUS at 15:37

## 2021-04-09 RX ADMIN — SODIUM CHLORIDE PRN MLS/HR: 0.9 INJECTION, SOLUTION INTRAVENOUS at 02:02

## 2021-04-09 NOTE — NUR
Slept through out the night. Appears comfortable. In no acute distress. VS WNL. No signs or 
symptoms of pain or SOB. SR and Sinus tachy on tele at 102. IV site on left FA intact and 
patent. IVF infusing. Needs assessed and attended to. Safety measure maintained and call 
bell within reached.

## 2021-04-09 NOTE — NUR
Received patient lying in bed. Alert but disoriented and non-verbal. In no apparent 
distress. Appears comfortable.  No signs or symptoms of pain or SOB. IV site on left FA 
intact and patent. IVF infusing. Safety measure initiated and call bell within reached. 
Continue to monitor.

## 2021-04-10 VITALS — SYSTOLIC BLOOD PRESSURE: 124 MMHG | DIASTOLIC BLOOD PRESSURE: 47 MMHG

## 2021-04-10 VITALS — SYSTOLIC BLOOD PRESSURE: 144 MMHG | DIASTOLIC BLOOD PRESSURE: 58 MMHG

## 2021-04-10 VITALS — SYSTOLIC BLOOD PRESSURE: 136 MMHG | DIASTOLIC BLOOD PRESSURE: 63 MMHG

## 2021-04-10 LAB
BASOPHILS # BLD AUTO: 0.1 K/UL (ref 0–8)
BASOPHILS NFR BLD AUTO: 0.6 % (ref 0–2)
BUN SERPL-MCNC: 38 MG/DL (ref 7–18)
CHLORIDE SERPL-SCNC: 111 MMOL/L (ref 98–107)
CO2 SERPL-SCNC: 27 MMOL/L (ref 21–32)
CREAT SERPL-MCNC: 0.9 MG/DL (ref 0.6–1.3)
EOSINOPHIL # BLD AUTO: 0.2 K/UL (ref 0–0.7)
EOSINOPHIL NFR BLD AUTO: 1.5 % (ref 0–7)
GLUCOSE SERPL-MCNC: 263 MG/DL (ref 74–106)
HCT VFR BLD AUTO: 31.1 % (ref 31.2–41.9)
HGB BLD-MCNC: 10 G/DL (ref 10.9–14.3)
LYMPHOCYTES # BLD AUTO: 1 K/UL (ref 20–40)
LYMPHOCYTES NFR BLD AUTO: 6.6 % (ref 20.5–51.5)
MAGNESIUM SERPL-MCNC: 1.8 MG/DL (ref 1.8–2.4)
MCH RBC QN AUTO: 27.5 UUG (ref 24.7–32.8)
MCHC RBC AUTO-ENTMCNC: 32 G/DL (ref 32.3–35.6)
MCV RBC AUTO: 85.8 FL (ref 75.5–95.3)
MONOCYTES # BLD AUTO: 0.6 K/UL (ref 2–10)
MONOCYTES NFR BLD AUTO: 4.4 % (ref 0–11)
NEUTROPHILS # BLD AUTO: 12.9 K/UL (ref 1.8–8.9)
NEUTROPHILS NFR BLD AUTO: 86.9 % (ref 38.5–71.5)
PLATELET # BLD AUTO: 291 K/UL (ref 179–408)
POTASSIUM SERPL-SCNC: 3.8 MMOL/L (ref 3.5–5.1)
RBC # BLD AUTO: 3.62 MIL/UL (ref 3.63–4.92)
WBC # BLD AUTO: 14.9 K/UL (ref 3.8–11.8)

## 2021-04-10 RX ADMIN — Medication SCH EACH: at 11:48

## 2021-04-10 RX ADMIN — Medication SCH ML: at 16:55

## 2021-04-10 RX ADMIN — AMLODIPINE BESYLATE SCH MG: 10 TABLET ORAL at 10:12

## 2021-04-10 RX ADMIN — SODIUM CHLORIDE PRN UNIT: 9 INJECTION, SOLUTION INTRAVENOUS at 11:54

## 2021-04-10 RX ADMIN — ENOXAPARIN SODIUM SCH MG: 40 INJECTION SUBCUTANEOUS at 10:14

## 2021-04-10 RX ADMIN — SODIUM CHLORIDE PRN UNIT: 9 INJECTION, SOLUTION INTRAVENOUS at 17:03

## 2021-04-10 RX ADMIN — METOPROLOL SUCCINATE SCH MG: 50 TABLET, FILM COATED, EXTENDED RELEASE ORAL at 10:13

## 2021-04-10 RX ADMIN — LOSARTAN POTASSIUM SCH MG: 50 TABLET, FILM COATED ORAL at 10:11

## 2021-04-10 RX ADMIN — SODIUM CHLORIDE PRN MLS/HR: 0.9 INJECTION, SOLUTION INTRAVENOUS at 07:22

## 2021-04-10 RX ADMIN — Medication SCH EACH: at 08:13

## 2021-04-10 RX ADMIN — ACETAMINOPHEN PRN MG: 325 TABLET ORAL at 20:04

## 2021-04-10 RX ADMIN — NITROFURANTOIN (MONOHYDRATE/MACROCRYSTALS) SCH MG: 75; 25 CAPSULE ORAL at 20:04

## 2021-04-10 RX ADMIN — Medication SCH EACH: at 20:05

## 2021-04-10 RX ADMIN — ASPIRIN SCH MG: 81 TABLET, CHEWABLE ORAL at 10:10

## 2021-04-10 RX ADMIN — Medication SCH ML: at 10:12

## 2021-04-10 RX ADMIN — NITROFURANTOIN (MONOHYDRATE/MACROCRYSTALS) SCH MG: 75; 25 CAPSULE ORAL at 10:10

## 2021-04-10 RX ADMIN — Medication SCH EACH: at 16:54

## 2021-04-10 NOTE — NUR
Appears  calm and comfortable. In no acute distress. No signs or symptoms of pain or SOB.  
IV site on left FA remains intact and patent. IVF infusing. Needs anticipated to and met. 
Safety measure maintained and call bell within reached.

## 2021-04-10 NOTE — NUR
Received patient lying in bed. AAOx1, mainly confused and disoriented, Speaks a 1-2 words at 
time. No signs or symptoms of pain or SOB noted. IV site on left FA intact and patent. IVF 
infusing. Quigley catheter intact and draining via gravity. Safety measure initiated and call 
bell within reached.

## 2021-04-11 VITALS — SYSTOLIC BLOOD PRESSURE: 126 MMHG | DIASTOLIC BLOOD PRESSURE: 29 MMHG

## 2021-04-11 VITALS — DIASTOLIC BLOOD PRESSURE: 61 MMHG | SYSTOLIC BLOOD PRESSURE: 135 MMHG

## 2021-04-11 VITALS — DIASTOLIC BLOOD PRESSURE: 65 MMHG | SYSTOLIC BLOOD PRESSURE: 149 MMHG

## 2021-04-11 VITALS — DIASTOLIC BLOOD PRESSURE: 66 MMHG | SYSTOLIC BLOOD PRESSURE: 146 MMHG

## 2021-04-11 LAB
BASOPHILS # BLD AUTO: 0 K/UL (ref 0–8)
BASOPHILS NFR BLD AUTO: 0.3 % (ref 0–2)
BUN SERPL-MCNC: 40 MG/DL (ref 7–18)
CHLORIDE SERPL-SCNC: 110 MMOL/L (ref 98–107)
CO2 SERPL-SCNC: 25 MMOL/L (ref 21–32)
CREAT SERPL-MCNC: 0.9 MG/DL (ref 0.6–1.3)
EOSINOPHIL # BLD AUTO: 0.2 K/UL (ref 0–0.7)
EOSINOPHIL NFR BLD AUTO: 2.2 % (ref 0–7)
GLUCOSE SERPL-MCNC: 233 MG/DL (ref 74–106)
HCT VFR BLD AUTO: 30.5 % (ref 31.2–41.9)
HGB BLD-MCNC: 9.6 G/DL (ref 10.9–14.3)
LYMPHOCYTES # BLD AUTO: 0.7 K/UL (ref 20–40)
LYMPHOCYTES NFR BLD AUTO: 6.6 % (ref 20.5–51.5)
MAGNESIUM SERPL-MCNC: 1.9 MG/DL (ref 1.8–2.4)
MCH RBC QN AUTO: 26.8 UUG (ref 24.7–32.8)
MCHC RBC AUTO-ENTMCNC: 31 G/DL (ref 32.3–35.6)
MCV RBC AUTO: 85.4 FL (ref 75.5–95.3)
MONOCYTES # BLD AUTO: 0.5 K/UL (ref 2–10)
MONOCYTES NFR BLD AUTO: 4.8 % (ref 0–11)
NEUTROPHILS # BLD AUTO: 8.6 K/UL (ref 1.8–8.9)
NEUTROPHILS NFR BLD AUTO: 86.1 % (ref 38.5–71.5)
PLATELET # BLD AUTO: 267 K/UL (ref 179–408)
POTASSIUM SERPL-SCNC: 3.4 MMOL/L (ref 3.5–5.1)
RBC # BLD AUTO: 3.57 MIL/UL (ref 3.63–4.92)
WBC # BLD AUTO: 10 K/UL (ref 3.8–11.8)

## 2021-04-11 RX ADMIN — Medication SCH EACH: at 11:44

## 2021-04-11 RX ADMIN — LOSARTAN POTASSIUM SCH MG: 50 TABLET, FILM COATED ORAL at 09:20

## 2021-04-11 RX ADMIN — AMLODIPINE BESYLATE SCH MG: 10 TABLET ORAL at 09:19

## 2021-04-11 RX ADMIN — METOPROLOL SUCCINATE SCH MG: 50 TABLET, FILM COATED, EXTENDED RELEASE ORAL at 09:21

## 2021-04-11 RX ADMIN — Medication SCH ML: at 09:21

## 2021-04-11 RX ADMIN — Medication SCH ML: at 16:59

## 2021-04-11 RX ADMIN — SODIUM CHLORIDE PRN UNIT: 9 INJECTION, SOLUTION INTRAVENOUS at 16:53

## 2021-04-11 RX ADMIN — SODIUM CHLORIDE PRN UNIT: 9 INJECTION, SOLUTION INTRAVENOUS at 09:17

## 2021-04-11 RX ADMIN — Medication SCH EACH: at 06:33

## 2021-04-11 RX ADMIN — ENOXAPARIN SODIUM SCH MG: 40 INJECTION SUBCUTANEOUS at 09:18

## 2021-04-11 RX ADMIN — Medication SCH EACH: at 11:43

## 2021-04-11 RX ADMIN — NITROFURANTOIN (MONOHYDRATE/MACROCRYSTALS) SCH MG: 75; 25 CAPSULE ORAL at 09:20

## 2021-04-11 RX ADMIN — ASPIRIN SCH MG: 81 TABLET, CHEWABLE ORAL at 09:20

## 2021-04-11 RX ADMIN — SODIUM CHLORIDE PRN UNIT: 9 INJECTION, SOLUTION INTRAVENOUS at 11:47

## 2021-04-11 RX ADMIN — POTASSIUM CHLORIDE SCH MLS/HR: 14.9 INJECTION, SOLUTION INTRAVENOUS at 13:51

## 2021-04-11 RX ADMIN — POTASSIUM CHLORIDE SCH MLS/HR: 14.9 INJECTION, SOLUTION INTRAVENOUS at 12:54

## 2021-04-11 NOTE — NUR
D/C TO HOME VIA AMBULANCE. SPOKE WITH SON OVER THE PHONE D/C INSTRUCTIONS GIVEN REGARDING 
MEDS AND WHAT IS IN THE D/C PACKET. EXPLAIN TO HIM TO CONTINUE REGULAR HOME MEDS AND THE ATB 
FOR 7 MORE DAYS AND TO PICK IT UP FROM THE PHARMACY ON FILE. VERBALIZED UNDERSTANDING. VSS

## 2021-04-11 NOTE — NUR
Patient asleep at this time but easily arouse to verabl stimuli. No signs or symptoms of 
pain or SOB. Appears comfortable. IV site on left FA intact and patent. IVF infusing. No 
adverse reaction noted from PO ABX. Quigley catheter remains intact and draining via gravity. 
Kept clean and dry. Needs anticipate to and met. Safety measure maintained.

## 2021-04-11 NOTE — NUR
F/C D/C AS ORDERED FOR D/C TO WELL REMOVED INTACT. SHE HAS URINATED ON HER  ML. 
REMOVED 900ML FROM HAHN BAG.

## 2021-05-06 ENCOUNTER — HOSPITAL ENCOUNTER (OUTPATIENT)
Dept: HOSPITAL 54 - WOU | Age: 86
Discharge: HOME HEALTH | End: 2021-05-06
Attending: SURGERY
Payer: MEDICARE

## 2021-05-06 DIAGNOSIS — I10: ICD-10-CM

## 2021-05-06 DIAGNOSIS — L89.153: Primary | ICD-10-CM

## 2021-05-06 DIAGNOSIS — L89.626: ICD-10-CM

## 2021-05-06 DIAGNOSIS — Z79.82: ICD-10-CM

## 2021-05-06 DIAGNOSIS — Z79.84: ICD-10-CM

## 2021-05-06 DIAGNOSIS — E11.9: ICD-10-CM

## 2021-05-06 DIAGNOSIS — L89.610: ICD-10-CM

## 2021-05-06 PROCEDURE — G0463 HOSPITAL OUTPT CLINIC VISIT: HCPCS

## 2021-05-20 ENCOUNTER — HOSPITAL ENCOUNTER (OUTPATIENT)
Dept: HOSPITAL 54 - WOU | Age: 86
Discharge: HOME HEALTH | End: 2021-05-20
Attending: SURGERY
Payer: MEDICARE

## 2021-05-20 DIAGNOSIS — Z79.82: ICD-10-CM

## 2021-05-20 DIAGNOSIS — L89.610: ICD-10-CM

## 2021-05-20 DIAGNOSIS — Z79.84: ICD-10-CM

## 2021-05-20 DIAGNOSIS — L89.153: Primary | ICD-10-CM

## 2021-05-20 DIAGNOSIS — L89.220: ICD-10-CM

## 2021-05-20 DIAGNOSIS — E11.9: ICD-10-CM

## 2021-06-03 ENCOUNTER — HOSPITAL ENCOUNTER (OUTPATIENT)
Dept: HOSPITAL 54 - WOU | Age: 86
Discharge: HOME HEALTH | End: 2021-06-03
Attending: SURGERY
Payer: MEDICARE

## 2021-06-03 DIAGNOSIS — L89.324: Primary | ICD-10-CM

## 2021-06-03 DIAGNOSIS — L89.153: ICD-10-CM

## 2021-06-03 DIAGNOSIS — Z79.82: ICD-10-CM

## 2021-06-03 DIAGNOSIS — L89.892: ICD-10-CM

## 2021-06-03 DIAGNOSIS — L89.610: ICD-10-CM

## 2021-06-24 ENCOUNTER — HOSPITAL ENCOUNTER (OUTPATIENT)
Dept: HOSPITAL 54 - WOU | Age: 86
Discharge: HOME HEALTH | End: 2021-06-24
Attending: SURGERY
Payer: MEDICARE

## 2021-06-24 DIAGNOSIS — Z79.82: ICD-10-CM

## 2021-06-24 DIAGNOSIS — L89.613: ICD-10-CM

## 2021-06-24 DIAGNOSIS — L89.153: ICD-10-CM

## 2021-06-24 DIAGNOSIS — L89.324: Primary | ICD-10-CM

## 2021-07-01 ENCOUNTER — HOSPITAL ENCOUNTER (OUTPATIENT)
Dept: HOSPITAL 54 - WOU | Age: 86
Discharge: HOME HEALTH | End: 2021-07-01
Attending: SURGERY
Payer: MEDICARE

## 2021-07-01 DIAGNOSIS — L89.616: ICD-10-CM

## 2021-07-01 DIAGNOSIS — L89.324: Primary | ICD-10-CM

## 2021-07-01 DIAGNOSIS — I10: ICD-10-CM

## 2021-07-01 DIAGNOSIS — E11.9: ICD-10-CM

## 2021-07-01 DIAGNOSIS — L89.153: ICD-10-CM

## 2021-07-01 DIAGNOSIS — Z79.84: ICD-10-CM

## 2021-07-01 DIAGNOSIS — L89.896: ICD-10-CM

## 2021-07-01 DIAGNOSIS — L89.626: ICD-10-CM

## 2021-07-01 DIAGNOSIS — L89.313: ICD-10-CM

## 2021-07-08 ENCOUNTER — HOSPITAL ENCOUNTER (INPATIENT)
Dept: HOSPITAL 12 - ER | Age: 86
LOS: 4 days | Discharge: HOME | DRG: 689 | End: 2021-07-12
Payer: MEDICARE

## 2021-07-08 VITALS — WEIGHT: 105.13 LBS | BODY MASS INDEX: 20.64 KG/M2 | HEIGHT: 60 IN

## 2021-07-08 VITALS — SYSTOLIC BLOOD PRESSURE: 132 MMHG | DIASTOLIC BLOOD PRESSURE: 51 MMHG

## 2021-07-08 VITALS — SYSTOLIC BLOOD PRESSURE: 134 MMHG | DIASTOLIC BLOOD PRESSURE: 105 MMHG

## 2021-07-08 DIAGNOSIS — B96.89: ICD-10-CM

## 2021-07-08 DIAGNOSIS — Z20.822: ICD-10-CM

## 2021-07-08 DIAGNOSIS — E11.42: ICD-10-CM

## 2021-07-08 DIAGNOSIS — M54.30: ICD-10-CM

## 2021-07-08 DIAGNOSIS — N39.0: Primary | ICD-10-CM

## 2021-07-08 DIAGNOSIS — D63.8: ICD-10-CM

## 2021-07-08 DIAGNOSIS — L90.5: ICD-10-CM

## 2021-07-08 DIAGNOSIS — Z87.440: ICD-10-CM

## 2021-07-08 DIAGNOSIS — E78.5: ICD-10-CM

## 2021-07-08 DIAGNOSIS — L97.519: ICD-10-CM

## 2021-07-08 DIAGNOSIS — I25.10: ICD-10-CM

## 2021-07-08 DIAGNOSIS — E11.65: ICD-10-CM

## 2021-07-08 DIAGNOSIS — R32: ICD-10-CM

## 2021-07-08 DIAGNOSIS — L97.529: ICD-10-CM

## 2021-07-08 DIAGNOSIS — G93.41: ICD-10-CM

## 2021-07-08 DIAGNOSIS — Z79.84: ICD-10-CM

## 2021-07-08 DIAGNOSIS — N31.9: ICD-10-CM

## 2021-07-08 DIAGNOSIS — D47.3: ICD-10-CM

## 2021-07-08 DIAGNOSIS — E11.621: ICD-10-CM

## 2021-07-08 DIAGNOSIS — Z79.82: ICD-10-CM

## 2021-07-08 DIAGNOSIS — F03.90: ICD-10-CM

## 2021-07-08 DIAGNOSIS — L89.324: ICD-10-CM

## 2021-07-08 DIAGNOSIS — Z90.49: ICD-10-CM

## 2021-07-08 LAB
ALP SERPL-CCNC: 76 U/L (ref 50–136)
ALT SERPL W/O P-5'-P-CCNC: 10 U/L (ref 14–59)
APPEARANCE UR: (no result)
AST SERPL-CCNC: 12 U/L (ref 15–37)
BILIRUB DIRECT SERPL-MCNC: 0.1 MG/DL (ref 0–0.2)
BILIRUB SERPL-MCNC: 0.2 MG/DL (ref 0.2–1)
BILIRUB UR QL STRIP: NEGATIVE
BUN SERPL-MCNC: 30 MG/DL (ref 7–18)
CHLORIDE SERPL-SCNC: 108 MMOL/L (ref 98–107)
CO2 SERPL-SCNC: 22 MMOL/L (ref 21–32)
COLOR UR: YELLOW
CREAT SERPL-MCNC: 0.9 MG/DL (ref 0.6–1.3)
DEPRECATED SQUAMOUS URNS QL MICRO: (no result) /HPF
GLUCOSE SERPL-MCNC: 131 MG/DL (ref 74–106)
GLUCOSE UR STRIP-MCNC: NEGATIVE MG/DL
HCT VFR BLD AUTO: 28.3 % (ref 31.2–41.9)
HGB UR QL STRIP: (no result)
KETONES UR STRIP-MCNC: NEGATIVE MG/DL
LEUKOCYTE ESTERASE UR QL STRIP: (no result)
MCH RBC QN AUTO: 25.7 UUG (ref 24.7–32.8)
MCV RBC AUTO: 79.6 FL (ref 75.5–95.3)
NITRITE UR QL STRIP: POSITIVE
PH UR STRIP: 5 [PH] (ref 5–8)
PLATELET # BLD AUTO: 458 K/UL (ref 179–408)
POTASSIUM SERPL-SCNC: 3.8 MMOL/L (ref 3.5–5.1)
SP GR UR STRIP: 1.02 (ref 1–1.03)
UROBILINOGEN UR STRIP-MCNC: 0.2 E.U./DL
WBC #/AREA URNS HPF: (no result) /HPF
WBC #/AREA URNS HPF: (no result) /HPF (ref 0–3)
WS STN SPEC: 8.1 G/DL (ref 6.4–8.2)

## 2021-07-08 PROCEDURE — A4663 DIALYSIS BLOOD PRESSURE CUFF: HCPCS

## 2021-07-08 PROCEDURE — A4217 STERILE WATER/SALINE, 500 ML: HCPCS

## 2021-07-08 PROCEDURE — G0378 HOSPITAL OBSERVATION PER HR: HCPCS

## 2021-07-08 RX ADMIN — ATORVASTATIN CALCIUM SCH MG: 40 TABLET, FILM COATED ORAL at 20:19

## 2021-07-08 RX ADMIN — METFORMIN HYDROCHLORIDE SCH MG: 500 TABLET ORAL at 18:41

## 2021-07-08 RX ADMIN — NITROFURANTOIN (MONOHYDRATE/MACROCRYSTALS) SCH MG: 75; 25 CAPSULE ORAL at 20:19

## 2021-07-08 RX ADMIN — ENOXAPARIN SODIUM SCH MG: 40 INJECTION SUBCUTANEOUS at 20:21

## 2021-07-08 RX ADMIN — INSULIN GLARGINE SCH UNITS: 100 INJECTION, SOLUTION SUBCUTANEOUS at 20:22

## 2021-07-08 RX ADMIN — Medication SCH EACH: at 18:41

## 2021-07-08 NOTE — NUR
NASRA UP, PT'S SON, [phone] CALLED AND SAID:

PT IS UNDER CARE OF DR. JAIRO MICHAELS AT [facility],  FOR WOUND MANAGEMENT, 
[phone]

PT IS ABLE TO SWALLOW PILLS IF CRUSHED AND MIXED WITH APPLE SAUCE, PT CAN HAVE 
SOFT FOOD WITH ASSISSTANCE,

PT IS FULL CODE.


-------------------------------------------------------------------------------

Addendum: 07/08/21 at 1200 by SPOURMANSO

-------------------------------------------------------------------------------

THE SON ALSO MENTIONED THAT PT'S SUGAR SPIKED AST NIGHT AND THAT IS USUALLY THE 
INDICATION THAT PT HAS UTI.

-------------------------------------------------------------------------------

Addendum: 07/08/21 at 1222 by SPOURMANSO

-------------------------------------------------------------------------------

PT' KAYLI ALSO MENTIONED THAT THE PT TAKES EXACTLY THE SAME MEDS AS THE LAST 
VISIT.

## 2021-07-08 NOTE — NUR
Pt received via [employer]. Pt is AOx1, to self. V/S stable on room air. Pt has indwelling 
urinary catheter, draining well. PIV LAC 20G intact. Pt is noted to have pressure sores on 
bilateral heels and full thickness skin loss in L.hip. Photos on pt chart. Safety measures 
in place. Call light within reach. Will continue with the plan of care.

## 2021-07-08 NOTE — NUR
There is no information about current home medications available at this time, 
per EMT pt's son stated he will be coming to the ER with further information.

## 2021-07-08 NOTE — NUR
PT REQUESTING THE PT TO BE ADMIITED, BECAUSE THE LAST TIME, PO ANTIBIOTICS AT 
HOME DID NOT HELP. ER MD TALKED TO SANDRO STILES DNP. SANDRO STILES AGREED TO ADMIT THE 
PT IN HOSPITAL.

## 2021-07-09 VITALS — SYSTOLIC BLOOD PRESSURE: 107 MMHG | DIASTOLIC BLOOD PRESSURE: 41 MMHG

## 2021-07-09 VITALS — DIASTOLIC BLOOD PRESSURE: 41 MMHG | SYSTOLIC BLOOD PRESSURE: 111 MMHG

## 2021-07-09 VITALS — DIASTOLIC BLOOD PRESSURE: 53 MMHG | SYSTOLIC BLOOD PRESSURE: 152 MMHG

## 2021-07-09 VITALS — SYSTOLIC BLOOD PRESSURE: 96 MMHG | DIASTOLIC BLOOD PRESSURE: 45 MMHG

## 2021-07-09 LAB
ALP SERPL-CCNC: 383 U/L (ref 50–136)
ALT SERPL W/O P-5'-P-CCNC: 61 U/L (ref 14–59)
AST SERPL-CCNC: 142 U/L (ref 15–37)
BILIRUB SERPL-MCNC: 0.3 MG/DL (ref 0.2–1)
BUN SERPL-MCNC: 24 MG/DL (ref 7–18)
CHLORIDE SERPL-SCNC: 110 MMOL/L (ref 98–107)
CHOLEST SERPL-MCNC: 96 MG/DL (ref ?–200)
CO2 SERPL-SCNC: 22 MMOL/L (ref 21–32)
CREAT SERPL-MCNC: 1 MG/DL (ref 0.6–1.3)
GLUCOSE SERPL-MCNC: 176 MG/DL (ref 74–106)
HCT VFR BLD AUTO: 29.6 % (ref 31.2–41.9)
HDLC SERPL-MCNC: 42 MG/DL (ref 40–60)
MAGNESIUM SERPL-MCNC: 1.6 MG/DL (ref 1.8–2.4)
MCH RBC QN AUTO: 24.7 UUG (ref 24.7–32.8)
MCV RBC AUTO: 79 FL (ref 75.5–95.3)
PHOSPHATE SERPL-MCNC: 2.8 MG/DL (ref 2.5–4.9)
PLATELET # BLD AUTO: 456 K/UL (ref 179–408)
POTASSIUM SERPL-SCNC: 3.8 MMOL/L (ref 3.5–5.1)
TRIGL SERPL-MCNC: 67 MG/DL (ref 30–150)
WS STN SPEC: 7.7 G/DL (ref 6.4–8.2)

## 2021-07-09 RX ADMIN — AMLODIPINE BESYLATE SCH MG: 10 TABLET ORAL at 09:13

## 2021-07-09 RX ADMIN — SODIUM CHLORIDE PRN UNIT: 9 INJECTION, SOLUTION INTRAVENOUS at 06:19

## 2021-07-09 RX ADMIN — Medication SCH EACH: at 23:48

## 2021-07-09 RX ADMIN — METFORMIN HYDROCHLORIDE SCH MG: 500 TABLET ORAL at 12:45

## 2021-07-09 RX ADMIN — METFORMIN HYDROCHLORIDE SCH MG: 500 TABLET ORAL at 18:12

## 2021-07-09 RX ADMIN — DEXTROSE SCH MLS/HR: 50 INJECTION, SOLUTION INTRAVENOUS at 11:56

## 2021-07-09 RX ADMIN — SODIUM HYPOCHLORITE SCH ML: 1.25 SOLUTION TOPICAL at 15:52

## 2021-07-09 RX ADMIN — METOPROLOL SUCCINATE SCH MG: 50 TABLET, FILM COATED, EXTENDED RELEASE ORAL at 09:13

## 2021-07-09 RX ADMIN — ENOXAPARIN SODIUM SCH MG: 40 INJECTION SUBCUTANEOUS at 21:23

## 2021-07-09 RX ADMIN — Medication SCH EACH: at 12:31

## 2021-07-09 RX ADMIN — Medication SCH EACH: at 00:50

## 2021-07-09 RX ADMIN — INSULIN GLARGINE SCH UNITS: 100 INJECTION, SOLUTION SUBCUTANEOUS at 21:00

## 2021-07-09 RX ADMIN — Medication SCH EACH: at 06:18

## 2021-07-09 RX ADMIN — ATORVASTATIN CALCIUM SCH MG: 40 TABLET, FILM COATED ORAL at 21:22

## 2021-07-09 RX ADMIN — LOSARTAN POTASSIUM SCH MG: 50 TABLET, FILM COATED ORAL at 09:12

## 2021-07-09 RX ADMIN — ACETAMINOPHEN PRN MG: 325 TABLET ORAL at 23:51

## 2021-07-09 RX ADMIN — Medication SCH EACH: at 17:38

## 2021-07-09 RX ADMIN — NITROFURANTOIN (MONOHYDRATE/MACROCRYSTALS) SCH MG: 75; 25 CAPSULE ORAL at 21:22

## 2021-07-09 RX ADMIN — ASPIRIN SCH MG: 81 TABLET, CHEWABLE ORAL at 09:12

## 2021-07-09 RX ADMIN — METFORMIN HYDROCHLORIDE SCH MG: 500 TABLET ORAL at 09:12

## 2021-07-09 NOTE — NUR
Pt received to care in her bed, awake, A/Ox0. Pt is fidgeting in bed, but unable to move. 
Pt's legs are contracted. Wound on left buttock is noted. Redness on heals. Pt is able to 
take medications crushed with apple sauce. Side rails are up and comfort measures are 
taken,.

## 2021-07-09 NOTE — NUR
Pt slept intermittently throughout the night. No distress noted. Tolerated all medications 
given. IV site intact. Quigley draining and patent. Safety and comfort provided. No other 
issues or concerns at this time, will endorse to day shift.

## 2021-07-09 NOTE — NUR
Patient's blood sugar was 63. No distress noted. Patient awake in bed. Given snacks and 
orange juice, will reassess.

## 2021-07-09 NOTE — NUR
WOUND CARE CONSULT: PT PRESENTS WITH MULTIPLE WOUNDS AND SKIN ISSUES PRESENT ON ADMISSION 
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 
AS WELL AS HEEL WOUNDS. SURGICAL ANDS DPM CONSULTS MADE TO DR DAVIS AND DR HYATT. 
RECOMMENDATIONS MADE FOR WOUND CARE AND SKIN PROTECTION. DISCUSSED WITH NURSING STAFF. MD IN 
AGREEMENT WITH PLAN OF CARE.

## 2021-07-10 VITALS — SYSTOLIC BLOOD PRESSURE: 106 MMHG | DIASTOLIC BLOOD PRESSURE: 44 MMHG

## 2021-07-10 VITALS — DIASTOLIC BLOOD PRESSURE: 51 MMHG | SYSTOLIC BLOOD PRESSURE: 123 MMHG

## 2021-07-10 VITALS — DIASTOLIC BLOOD PRESSURE: 52 MMHG | SYSTOLIC BLOOD PRESSURE: 112 MMHG

## 2021-07-10 VITALS — SYSTOLIC BLOOD PRESSURE: 115 MMHG | DIASTOLIC BLOOD PRESSURE: 44 MMHG

## 2021-07-10 VITALS — SYSTOLIC BLOOD PRESSURE: 112 MMHG | DIASTOLIC BLOOD PRESSURE: 52 MMHG

## 2021-07-10 LAB
BUN SERPL-MCNC: 25 MG/DL (ref 7–18)
CHLORIDE SERPL-SCNC: 107 MMOL/L (ref 98–107)
CO2 SERPL-SCNC: 27 MMOL/L (ref 21–32)
CREAT SERPL-MCNC: 0.9 MG/DL (ref 0.6–1.3)
GLUCOSE SERPL-MCNC: 118 MG/DL (ref 74–106)
HCT VFR BLD AUTO: 30.3 % (ref 31.2–41.9)
MAGNESIUM SERPL-MCNC: 1.9 MG/DL (ref 1.8–2.4)
MCH RBC QN AUTO: 25.1 UUG (ref 24.7–32.8)
MCV RBC AUTO: 78.8 FL (ref 75.5–95.3)
PHOSPHATE SERPL-MCNC: 3.1 MG/DL (ref 2.5–4.9)
PLATELET # BLD AUTO: 428 K/UL (ref 179–408)
POTASSIUM SERPL-SCNC: 4.4 MMOL/L (ref 3.5–5.1)

## 2021-07-10 RX ADMIN — METFORMIN HYDROCHLORIDE SCH MG: 500 TABLET ORAL at 11:26

## 2021-07-10 RX ADMIN — Medication SCH EACH: at 17:48

## 2021-07-10 RX ADMIN — AMLODIPINE BESYLATE SCH MG: 10 TABLET ORAL at 08:33

## 2021-07-10 RX ADMIN — Medication SCH EACH: at 06:39

## 2021-07-10 RX ADMIN — DEXTROSE SCH MLS/HR: 50 INJECTION, SOLUTION INTRAVENOUS at 10:48

## 2021-07-10 RX ADMIN — INSULIN GLARGINE SCH UNITS: 100 INJECTION, SOLUTION SUBCUTANEOUS at 20:52

## 2021-07-10 RX ADMIN — ENOXAPARIN SODIUM SCH MG: 40 INJECTION SUBCUTANEOUS at 20:26

## 2021-07-10 RX ADMIN — METOPROLOL SUCCINATE SCH MG: 50 TABLET, FILM COATED, EXTENDED RELEASE ORAL at 08:33

## 2021-07-10 RX ADMIN — Medication SCH EACH: at 23:44

## 2021-07-10 RX ADMIN — SODIUM CHLORIDE PRN UNIT: 9 INJECTION, SOLUTION INTRAVENOUS at 17:50

## 2021-07-10 RX ADMIN — SODIUM CHLORIDE PRN UNIT: 9 INJECTION, SOLUTION INTRAVENOUS at 11:27

## 2021-07-10 RX ADMIN — METFORMIN HYDROCHLORIDE SCH MG: 500 TABLET ORAL at 17:41

## 2021-07-10 RX ADMIN — SODIUM CHLORIDE PRN UNIT: 9 INJECTION, SOLUTION INTRAVENOUS at 20:55

## 2021-07-10 RX ADMIN — ACETAMINOPHEN PRN MG: 325 TABLET ORAL at 11:48

## 2021-07-10 RX ADMIN — METFORMIN HYDROCHLORIDE SCH MG: 500 TABLET ORAL at 08:25

## 2021-07-10 RX ADMIN — Medication SCH ML: at 17:58

## 2021-07-10 RX ADMIN — ASPIRIN SCH MG: 81 TABLET, CHEWABLE ORAL at 08:25

## 2021-07-10 RX ADMIN — ATORVASTATIN CALCIUM SCH MG: 40 TABLET, FILM COATED ORAL at 20:24

## 2021-07-10 RX ADMIN — SODIUM HYPOCHLORITE SCH ML: 1.25 SOLUTION TOPICAL at 08:35

## 2021-07-10 RX ADMIN — Medication SCH EACH: at 11:25

## 2021-07-10 RX ADMIN — LOSARTAN POTASSIUM SCH MG: 50 TABLET, FILM COATED ORAL at 08:33

## 2021-07-10 NOTE — NUR
Pt received resting in bed. AxO to self. VSS. Pt is in no acute distress, no s/s of pain 
noted.  IV Left AC heplock flushed, patent intact. All due medications administered and 
tolerated well, crushed with pudding, aspiration precautions marinated. BS checked 88, did 
not administer lantus. BS checked @midnight 100, no coverage. No s/s of hypo/hyperglycemia 
noted.  Quigley draining and patent. Legs are contracted. Turned and repositioned pt Q2h. 
Wound on left buttock is noted. Redness on heals, offloading with pillows. Needs attended 
too. Encourage pt to reposition. Kept clean, dry and comfortable. Safety measures 
maintained. Will continue to monitor.

## 2021-07-10 NOTE — NUR
Patient received in bed with eyes closed, but easily arousable, alert and oriented to self 
only. No acute distress noted at this time. Left AC IV is patent with no redness or swelling 
at this time. Quigley catheter in place draining clear yellow urine via gravity. Aspiration 
and fall precautions in place. Call light within easy reach. Will continue to monitor.

## 2021-07-11 VITALS — DIASTOLIC BLOOD PRESSURE: 58 MMHG | SYSTOLIC BLOOD PRESSURE: 120 MMHG

## 2021-07-11 VITALS — SYSTOLIC BLOOD PRESSURE: 130 MMHG | DIASTOLIC BLOOD PRESSURE: 57 MMHG

## 2021-07-11 VITALS — DIASTOLIC BLOOD PRESSURE: 44 MMHG | SYSTOLIC BLOOD PRESSURE: 101 MMHG

## 2021-07-11 VITALS — SYSTOLIC BLOOD PRESSURE: 126 MMHG | DIASTOLIC BLOOD PRESSURE: 43 MMHG

## 2021-07-11 VITALS — DIASTOLIC BLOOD PRESSURE: 52 MMHG | SYSTOLIC BLOOD PRESSURE: 53 MMHG

## 2021-07-11 LAB
BUN SERPL-MCNC: 28 MG/DL (ref 7–18)
CHLORIDE SERPL-SCNC: 107 MMOL/L (ref 98–107)
CO2 SERPL-SCNC: 26 MMOL/L (ref 21–32)
CREAT SERPL-MCNC: 0.8 MG/DL (ref 0.6–1.3)
GLUCOSE SERPL-MCNC: 36 MG/DL (ref 74–106)
HCT VFR BLD AUTO: 30.2 % (ref 31.2–41.9)
MAGNESIUM SERPL-MCNC: 1.9 MG/DL (ref 1.8–2.4)
MCH RBC QN AUTO: 25.5 UUG (ref 24.7–32.8)
MCV RBC AUTO: 79.7 FL (ref 75.5–95.3)
PHOSPHATE SERPL-MCNC: 2.9 MG/DL (ref 2.5–4.9)
PLATELET # BLD AUTO: 508 K/UL (ref 179–408)
POTASSIUM SERPL-SCNC: 3.8 MMOL/L (ref 3.5–5.1)

## 2021-07-11 RX ADMIN — Medication SCH ML: at 08:55

## 2021-07-11 RX ADMIN — INSULIN GLARGINE SCH UNITS: 100 INJECTION, SOLUTION SUBCUTANEOUS at 20:43

## 2021-07-11 RX ADMIN — METOPROLOL SUCCINATE SCH MG: 50 TABLET, FILM COATED, EXTENDED RELEASE ORAL at 08:55

## 2021-07-11 RX ADMIN — Medication SCH EACH: at 12:25

## 2021-07-11 RX ADMIN — Medication SCH ML: at 12:30

## 2021-07-11 RX ADMIN — DEXTROSE MONOHYDRATE PRN ML: 500 INJECTION PARENTERAL at 23:47

## 2021-07-11 RX ADMIN — DEXTROSE MONOHYDRATE PRN ML: 500 INJECTION PARENTERAL at 06:27

## 2021-07-11 RX ADMIN — METFORMIN HYDROCHLORIDE SCH MG: 500 TABLET ORAL at 16:52

## 2021-07-11 RX ADMIN — DEXTROSE SCH MLS/HR: 50 INJECTION, SOLUTION INTRAVENOUS at 12:25

## 2021-07-11 RX ADMIN — ASPIRIN SCH MG: 81 TABLET, CHEWABLE ORAL at 08:51

## 2021-07-11 RX ADMIN — Medication SCH EACH: at 05:44

## 2021-07-11 RX ADMIN — DEXTROSE MONOHYDRATE PRN ML: 500 INJECTION PARENTERAL at 05:36

## 2021-07-11 RX ADMIN — Medication SCH EACH: at 23:46

## 2021-07-11 RX ADMIN — SODIUM CHLORIDE PRN UNIT: 9 INJECTION, SOLUTION INTRAVENOUS at 00:00

## 2021-07-11 RX ADMIN — LOSARTAN POTASSIUM SCH MG: 50 TABLET, FILM COATED ORAL at 08:55

## 2021-07-11 RX ADMIN — METFORMIN HYDROCHLORIDE SCH MG: 500 TABLET ORAL at 12:30

## 2021-07-11 RX ADMIN — SODIUM CHLORIDE PRN UNIT: 9 INJECTION, SOLUTION INTRAVENOUS at 16:56

## 2021-07-11 RX ADMIN — ENOXAPARIN SODIUM SCH MG: 40 INJECTION SUBCUTANEOUS at 20:42

## 2021-07-11 RX ADMIN — SODIUM HYPOCHLORITE SCH ML: 1.25 SOLUTION TOPICAL at 08:55

## 2021-07-11 RX ADMIN — METFORMIN HYDROCHLORIDE SCH MG: 500 TABLET ORAL at 08:56

## 2021-07-11 RX ADMIN — SODIUM CHLORIDE PRN UNIT: 9 INJECTION, SOLUTION INTRAVENOUS at 12:29

## 2021-07-11 RX ADMIN — Medication SCH EACH: at 16:54

## 2021-07-11 RX ADMIN — AMLODIPINE BESYLATE SCH MG: 10 TABLET ORAL at 08:54

## 2021-07-11 RX ADMIN — ATORVASTATIN CALCIUM SCH MG: 40 TABLET, FILM COATED ORAL at 20:39

## 2021-07-11 RX ADMIN — Medication SCH ML: at 16:54

## 2021-07-11 NOTE — NUR
Pt contracted on fetal position. put pillow in between legs.   Heel floated.  Left sacral 
dressing intact.  Call light is within reach.  PT has good appetite.

## 2021-07-11 NOTE — NUR
Found pt chewing on id band.  Applied new ID band on foot of the bed. Dressing change done 
as ordered.  Wound had bad odor.  Green sloughing noted with purulent drain noted.  PT 
tolerated dressing change.  

-------------------------------------------------------------------------------

Addendum: 07/11/21 at 1850 by LEEANN LEIJA RN

-------------------------------------------------------------------------------

able to get ID band out of pt's mouth.

## 2021-07-12 VITALS — SYSTOLIC BLOOD PRESSURE: 95 MMHG | DIASTOLIC BLOOD PRESSURE: 60 MMHG

## 2021-07-12 VITALS — DIASTOLIC BLOOD PRESSURE: 45 MMHG | SYSTOLIC BLOOD PRESSURE: 114 MMHG

## 2021-07-12 VITALS — SYSTOLIC BLOOD PRESSURE: 120 MMHG | DIASTOLIC BLOOD PRESSURE: 52 MMHG

## 2021-07-12 LAB
BUN SERPL-MCNC: 23 MG/DL (ref 7–18)
CHLORIDE SERPL-SCNC: 104 MMOL/L (ref 98–107)
CO2 SERPL-SCNC: 27 MMOL/L (ref 21–32)
CREAT SERPL-MCNC: 0.8 MG/DL (ref 0.6–1.3)
GLUCOSE SERPL-MCNC: 35 MG/DL (ref 74–106)
HCT VFR BLD AUTO: 27.7 % (ref 31.2–41.9)
MAGNESIUM SERPL-MCNC: 1.7 MG/DL (ref 1.8–2.4)
MCH RBC QN AUTO: 25.4 UUG (ref 24.7–32.8)
MCV RBC AUTO: 79.1 FL (ref 75.5–95.3)
PHOSPHATE SERPL-MCNC: 2.7 MG/DL (ref 2.5–4.9)
PLATELET # BLD AUTO: 500 K/UL (ref 179–408)
POTASSIUM SERPL-SCNC: 3.5 MMOL/L (ref 3.5–5.1)

## 2021-07-12 RX ADMIN — SODIUM CHLORIDE PRN UNIT: 9 INJECTION, SOLUTION INTRAVENOUS at 11:33

## 2021-07-12 RX ADMIN — Medication SCH ML: at 11:42

## 2021-07-12 RX ADMIN — MAGNESIUM SULFATE IN DEXTROSE SCH MLS/HR: 10 INJECTION, SOLUTION INTRAVENOUS at 10:20

## 2021-07-12 RX ADMIN — Medication SCH EACH: at 11:31

## 2021-07-12 RX ADMIN — DEXTROSE MONOHYDRATE PRN ML: 500 INJECTION PARENTERAL at 06:10

## 2021-07-12 RX ADMIN — DEXTROSE SCH MLS/HR: 50 INJECTION, SOLUTION INTRAVENOUS at 11:26

## 2021-07-12 RX ADMIN — METFORMIN HYDROCHLORIDE SCH MG: 500 TABLET ORAL at 08:08

## 2021-07-12 RX ADMIN — ASPIRIN SCH MG: 81 TABLET, CHEWABLE ORAL at 08:08

## 2021-07-12 RX ADMIN — MAGNESIUM SULFATE IN DEXTROSE SCH MLS/HR: 10 INJECTION, SOLUTION INTRAVENOUS at 11:26

## 2021-07-12 RX ADMIN — METFORMIN HYDROCHLORIDE SCH MG: 500 TABLET ORAL at 11:42

## 2021-07-12 RX ADMIN — METOPROLOL SUCCINATE SCH MG: 50 TABLET, FILM COATED, EXTENDED RELEASE ORAL at 08:13

## 2021-07-12 RX ADMIN — AMLODIPINE BESYLATE SCH MG: 10 TABLET ORAL at 08:12

## 2021-07-12 RX ADMIN — METFORMIN HYDROCHLORIDE SCH MG: 500 TABLET ORAL at 17:00

## 2021-07-12 RX ADMIN — LOSARTAN POTASSIUM SCH MG: 50 TABLET, FILM COATED ORAL at 08:14

## 2021-07-12 RX ADMIN — Medication SCH EACH: at 06:06

## 2021-07-12 RX ADMIN — SODIUM HYPOCHLORITE SCH ML: 1.25 SOLUTION TOPICAL at 08:13

## 2021-07-12 RX ADMIN — Medication SCH ML: at 17:31

## 2021-07-12 RX ADMIN — SODIUM CHLORIDE PRN UNIT: 9 INJECTION, SOLUTION INTRAVENOUS at 08:26

## 2021-07-12 RX ADMIN — Medication SCH ML: at 08:14

## 2021-07-12 NOTE — NUR
Called Raoul Son. Discussed new medications.  Pt has prior home health setting with his mom.  
Notified son that  blood sugar patterns have been low at [place] and in AM within the last couple 
nights.  Instruct son to check and monitor pts bs around mn and early AM.  Pt SON RAOUL 
states I know what im doing hasmukh been taking care of my mom for YEARs. Pt states that he 
knows s/s of low BS. Instructed SON to continue Medications as prescribed exactly on the the 
discharge paper.  Pt SON Verbalize understanding. Home Health to fu pt at home.  F/C remain 
with pt secondary to pt is incontinent and pt has a large wound which can be prone for infx 
from pt being incontinent.  Pt is in no acute distress upon discharge.

## 2021-07-12 NOTE — NUR
Consultation:



 consultation requested for wounds.  This LCSW attempted to meet with the 
patient.  Patient is awake, however is not responsive to name; patient is not communicative. 
 LCSW is unable to gather information from the patient.  Per nursing report, patient is 
oriented x 0.  Per medical records, patient was brought to the ED by paramedics on 07/08/21 
for BS level of over 500, possible UTI.  Per medical records, patient lives with her son 
Raoul, [phone].  Raoul is patients primary caregiver, and patient is dependent for all 
ADLs.  Per nursing report, patient has multiple wounds on buttocks, sacral area, and both 
heels.  Based on information reported from nursing, and information gathered from medical 
records, this LCSW will make an APS report for multiple wounds/neglect.

 will remain available, as needed.

## 2021-07-12 NOTE — NUR
Pt is in no acute distress.  Call light is within reach. IV on right hand intact.  Heel 
floated and f/u with central about the kci bed.  Per central kci bed has been ordered.

## 2021-07-15 ENCOUNTER — HOSPITAL ENCOUNTER (OUTPATIENT)
Dept: HOSPITAL 54 - WOU | Age: 86
Discharge: HOME HEALTH | End: 2021-07-15
Attending: SURGERY
Payer: MEDICARE

## 2021-07-15 DIAGNOSIS — I10: ICD-10-CM

## 2021-07-15 DIAGNOSIS — L89.153: ICD-10-CM

## 2021-07-15 DIAGNOSIS — Z79.84: ICD-10-CM

## 2021-07-15 DIAGNOSIS — L89.613: ICD-10-CM

## 2021-07-15 DIAGNOSIS — L89.896: ICD-10-CM

## 2021-07-15 DIAGNOSIS — E11.9: ICD-10-CM

## 2021-07-15 DIAGNOSIS — L89.626: ICD-10-CM

## 2021-07-15 DIAGNOSIS — L89.324: Primary | ICD-10-CM

## 2021-07-15 DIAGNOSIS — L89.313: ICD-10-CM

## 2021-07-15 DIAGNOSIS — Z79.82: ICD-10-CM

## 2021-09-07 ENCOUNTER — HOSPITAL ENCOUNTER (OUTPATIENT)
Dept: HOSPITAL 54 - WOU | Age: 86
Discharge: HOME HEALTH | End: 2021-09-07
Attending: SURGERY
Payer: MEDICARE

## 2021-09-07 DIAGNOSIS — L89.213: ICD-10-CM

## 2021-09-07 DIAGNOSIS — L89.324: Primary | ICD-10-CM

## 2021-09-07 DIAGNOSIS — I11.0: ICD-10-CM

## 2021-09-07 DIAGNOSIS — L89.620: ICD-10-CM

## 2021-09-07 DIAGNOSIS — L89.896: ICD-10-CM

## 2021-09-07 DIAGNOSIS — Z79.82: ICD-10-CM

## 2021-09-07 DIAGNOSIS — L89.313: ICD-10-CM

## 2021-09-07 DIAGNOSIS — L89.153: ICD-10-CM

## 2021-09-07 DIAGNOSIS — Z79.84: ICD-10-CM

## 2021-09-07 DIAGNOSIS — E11.9: ICD-10-CM

## 2021-09-07 DIAGNOSIS — L89.613: ICD-10-CM

## 2021-09-14 ENCOUNTER — HOSPITAL ENCOUNTER (OUTPATIENT)
Dept: HOSPITAL 54 - WOU | Age: 86
Discharge: HOME HEALTH | End: 2021-09-14
Attending: SURGERY
Payer: MEDICARE

## 2021-09-14 DIAGNOSIS — L89.896: ICD-10-CM

## 2021-09-14 DIAGNOSIS — Z79.82: ICD-10-CM

## 2021-09-14 DIAGNOSIS — Z79.84: ICD-10-CM

## 2021-09-14 DIAGNOSIS — L89.313: ICD-10-CM

## 2021-09-14 DIAGNOSIS — L89.516: ICD-10-CM

## 2021-09-14 DIAGNOSIS — I10: ICD-10-CM

## 2021-09-14 DIAGNOSIS — L89.613: ICD-10-CM

## 2021-09-14 DIAGNOSIS — L89.153: ICD-10-CM

## 2021-09-14 DIAGNOSIS — L89.620: ICD-10-CM

## 2021-09-14 DIAGNOSIS — L89.324: Primary | ICD-10-CM

## 2021-09-14 DIAGNOSIS — L89.893: ICD-10-CM

## 2021-09-14 DIAGNOSIS — E11.9: ICD-10-CM

## 2021-09-14 DIAGNOSIS — L89.210: ICD-10-CM

## 2021-10-12 ENCOUNTER — HOSPITAL ENCOUNTER (OUTPATIENT)
Dept: HOSPITAL 54 - WOU | Age: 86
Discharge: HOME HEALTH | End: 2021-10-12
Attending: SURGERY
Payer: MEDICARE

## 2021-10-12 DIAGNOSIS — L89.324: Primary | ICD-10-CM

## 2021-10-12 DIAGNOSIS — I10: ICD-10-CM

## 2021-10-12 DIAGNOSIS — L89.894: ICD-10-CM

## 2021-10-12 DIAGNOSIS — E11.9: ICD-10-CM

## 2021-10-12 DIAGNOSIS — L89.313: ICD-10-CM

## 2021-10-12 DIAGNOSIS — L89.620: ICD-10-CM

## 2021-10-12 DIAGNOSIS — L89.890: ICD-10-CM

## 2021-10-12 DIAGNOSIS — L89.153: ICD-10-CM

## 2021-10-12 DIAGNOSIS — L89.613: ICD-10-CM

## 2021-10-12 DIAGNOSIS — L89.214: ICD-10-CM

## 2021-10-12 DIAGNOSIS — Z79.84: ICD-10-CM

## 2021-11-16 NOTE — NUR
RN TELE NOTES 

SON AT BEDSIDE, PATIENT SLEEPING COMFORTABLY, BREATHING EVEN AND UNLABORED, NO S/SX OF 
DISTRESS NOTED, VITAL SIGNS STABLE, HOB ELEVATED, SAFETY MEASURES IN PLACED, CALL LIGHT 
WITHIN REACH, WILL CONTINUE TO MONITOR. Not applicable

## 2023-05-22 NOTE — NUR
PT'S DIET HAS BEEN UPDATED TO PUREED. NO ACUTE DISTRESS OR SOB NOTED. PT'S LOWER EXTREMITIES 
ARE CROSSED AND CONTRACTED. PT HAS AN ABRASION ON HER SACRAL AREA(MEPILEX APPLIED TO SACRAL 
AREA). ABRASION ON L ARM (MEPILEX APPLIED, PT KEEPS REMOVING) AND SCABS ON HER R FOOT. PICS 
TAKEN. PT HAS IV ACCESS ON L AC 20 GAUGE, FLUSHED AND PATENT COVERED. WILL CONTINUE TO 
MONITOR. Supplies sent to pharmacy.